# Patient Record
Sex: FEMALE | Race: WHITE | NOT HISPANIC OR LATINO | Employment: UNEMPLOYED | URBAN - METROPOLITAN AREA
[De-identification: names, ages, dates, MRNs, and addresses within clinical notes are randomized per-mention and may not be internally consistent; named-entity substitution may affect disease eponyms.]

---

## 2017-07-24 ENCOUNTER — PROCEDURE VISIT (OUTPATIENT)
Dept: OBSTETRICS AND GYNECOLOGY | Facility: CLINIC | Age: 22
End: 2017-07-24

## 2017-07-24 VITALS — WEIGHT: 156 LBS | SYSTOLIC BLOOD PRESSURE: 120 MMHG | DIASTOLIC BLOOD PRESSURE: 70 MMHG | HEART RATE: 80 BPM

## 2017-07-24 DIAGNOSIS — Z30.09 COUNSELING FOR INITIATION OF BIRTH CONTROL METHOD: ICD-10-CM

## 2017-07-24 DIAGNOSIS — Z01.419 WELL WOMAN EXAM: Primary | ICD-10-CM

## 2017-07-24 PROCEDURE — 99395 PREV VISIT EST AGE 18-39: CPT | Performed by: OBSTETRICS & GYNECOLOGY

## 2017-07-24 NOTE — PROGRESS NOTES
Subjective   Chief Complaint   Patient presents with   • Gynecologic Exam     Preventive exam and pap. No complaints     Salina De La Fuente is a 22 y.o. year old  presenting to be seen for her annual exam. Patient is still breast-feeding was using Depo-Provera for birth control.  She had problems obtaining Depo-Provera from the pharmacy and discontinued use.  She is searching for alternative birth control method but wants to continue nursing for at least 4 months.  Is afraid birth control pills with dry up her breast milk worried about the side effects of IUD and Nexplanon.  Condoms were suggested.  The patient is having monthly periods since 2017.      SEXUAL Hx:  She is currently sexually active.  In the past year there has not been new sexual partners.    Condoms are not typically used.  She would not like to be screened for STD's at today's exam.  Current birth control method: not using any form of contraception and does not wish to get pregnant.  She is not happy with her current method of contraception and does want to discuss alternative methods of contraception.  MENSTRUAL Hx:  Patient's last menstrual period was 2017.  In the past 6 months her cycles have been regular, predictable and occur monthly.  Her menstrual flow is typically light.   Each month on average there are roughly 0 day(s) of very heavy flow.    Intermenstrual bleeding is absent.    Post-coital bleeding is absent.  Dysmenorrhea: is not affecting her activities of daily living  PMS: is not affecting her activities of daily living  Her cycles are not a source of concern for her that she wishes to discuss today.  HEALTH Hx:  She exercises regularly:yes.  She wears her seat belt:yes.  She has concerns about domestic violence: no.  OTHER COMPLAINTS:  Nothing else    The following portions of the patient's history were reviewed and updated as appropriate:problem list, current medications, allergies, past family history, past medical  history, past social history and past surgical history.    Smoking status: Never Smoker                                                              Smokeless status: Never Used                        Review of Systems  Review of Systems - History obtained from the patient  General ROS: negative  Psychological ROS: negative  ENT ROS: positive for - nasal congestion and sore throat  Allergy and Immunology ROS: positive for - seasonal allergies  Hematological and Lymphatic ROS: negative  Endocrine ROS: negative  Breast ROS: negative for breast lumps  Respiratory ROS: no cough, shortness of breath, or wheezing  Cardiovascular ROS: no chest pain or dyspnea on exertion  Gastrointestinal ROS: no abdominal pain, change in bowel habits, or black or bloody stools  Genito-Urinary ROS: no dysuria, trouble voiding, or hematuria  Musculoskeletal ROS: negative  Neurological ROS: no TIA or stroke symptoms  Dermatological ROS: negative        Objective   /70  Pulse 80  Wt 156 lb (70.8 kg)  LMP 07/23/2017  Breastfeeding? Yes    General:  well developed; well nourished  no acute distress   Skin:  No suspicious lesions seen   Thyroid: not examined   Breasts:  Examined in supine position  Symmetric without masses or skin dimpling  Nipples normal without inversion, lesions or discharge  There are no palpable axillary nodes   Abdomen: soft, non-tender; no masses  no umbilical or inginual hernias are present  no hepato-splenomegaly   Heart: regular rate and rhythm, S1, S2 normal, no murmur, click, rub or gallop   Lungs: clear to auscultation   Pelvis: Clinical staff was present for exam  External genitalia:  normal appearance of the external genitalia including [place]'s and [place]'s glands.  :  urethral meatus normal; urethral hypermobility is absent.  Vaginal:  normal pink mucosa without prolapse or lesions. discharge present -  bloody;  Cervix:  normal appearance. Pap was done  Uterus:  normal size, shape and consistency.  retroverted;  Adnexa:  normal bimanual exam of the adnexa.  Rectal:  digital rectal exam not performed; anus visually normal appearing.          Assessment/Plan   Salina was seen today for gynecologic exam.    Diagnoses and all orders for this visit:    Well woman exam  -     Liquid-based Pap Smear, Screening    Counseling for initiation of birth control method         Return one year  This note was electronically signed.    Daniel Prado MD   July 24, 2017

## 2018-04-25 ENCOUNTER — TELEPHONE (OUTPATIENT)
Dept: OBSTETRICS AND GYNECOLOGY | Facility: CLINIC | Age: 23
End: 2018-04-25

## 2018-04-25 NOTE — TELEPHONE ENCOUNTER
Pt called in stating that she had a positive pregnancy test and that she has an appt scheduled for May 23. States that she has had some cramping and is now having some pain in her lower abdomen right over her hip bone that is not unbearable but just wanted to call and make sure that she didn't need to be concerned. Pt stated that she has had no bleeding. I informed pt that she could take tylenol for the pain and that if it became unbearable to go to the ER. Also told pt that if she began to have any bright red bleeding to give us a call or if it was after hours to go to ER. Pt stated understanding.

## 2018-05-23 ENCOUNTER — INITIAL PRENATAL (OUTPATIENT)
Dept: OBSTETRICS AND GYNECOLOGY | Facility: CLINIC | Age: 23
End: 2018-05-23

## 2018-05-23 ENCOUNTER — APPOINTMENT (OUTPATIENT)
Dept: LAB | Facility: HOSPITAL | Age: 23
End: 2018-05-23

## 2018-05-23 VITALS
DIASTOLIC BLOOD PRESSURE: 60 MMHG | BODY MASS INDEX: 28 KG/M2 | SYSTOLIC BLOOD PRESSURE: 110 MMHG | WEIGHT: 164 LBS | HEIGHT: 64 IN

## 2018-05-23 DIAGNOSIS — Z34.81 PRENATAL CARE, SUBSEQUENT PREGNANCY, FIRST TRIMESTER: Primary | ICD-10-CM

## 2018-05-23 LAB
ABO GROUP BLD: NORMAL
BASOPHILS # BLD AUTO: 0.01 10*3/MM3 (ref 0–0.2)
BASOPHILS NFR BLD AUTO: 0.2 % (ref 0–1)
BILIRUB UR QL STRIP: NEGATIVE
BLD GP AB SCN SERPL QL: NEGATIVE
CLARITY UR: CLEAR
COLOR UR: YELLOW
DEPRECATED RDW RBC AUTO: 39.9 FL (ref 37–54)
EOSINOPHIL # BLD AUTO: 0.11 10*3/MM3 (ref 0–0.3)
EOSINOPHIL NFR BLD AUTO: 1.8 % (ref 0–3)
ERYTHROCYTE [DISTWIDTH] IN BLOOD BY AUTOMATED COUNT: 12.4 % (ref 11.3–14.5)
GLUCOSE UR STRIP-MCNC: NEGATIVE MG/DL
HBA1C MFR BLD: 5.3 % (ref 4.8–5.6)
HBV SURFACE AG SERPL QL IA: NORMAL
HCT VFR BLD AUTO: 39.2 % (ref 34.5–44)
HCV AB SER DONR QL: NORMAL
HGB BLD-MCNC: 13.3 G/DL (ref 11.5–15.5)
HGB UR QL STRIP.AUTO: NEGATIVE
HIV1+2 AB SER QL: NORMAL
IMM GRANULOCYTES # BLD: 0.01 10*3/MM3 (ref 0–0.03)
IMM GRANULOCYTES NFR BLD: 0.2 % (ref 0–0.6)
KETONES UR QL STRIP: NEGATIVE
LEUKOCYTE ESTERASE UR QL STRIP.AUTO: NEGATIVE
LYMPHOCYTES # BLD AUTO: 1.76 10*3/MM3 (ref 0.6–4.8)
LYMPHOCYTES NFR BLD AUTO: 29 % (ref 24–44)
MCH RBC QN AUTO: 29.6 PG (ref 27–31)
MCHC RBC AUTO-ENTMCNC: 33.9 G/DL (ref 32–36)
MCV RBC AUTO: 87.3 FL (ref 80–99)
MONOCYTES # BLD AUTO: 0.35 10*3/MM3 (ref 0–1)
MONOCYTES NFR BLD AUTO: 5.8 % (ref 0–12)
NEUTROPHILS # BLD AUTO: 3.82 10*3/MM3 (ref 1.5–8.3)
NEUTROPHILS NFR BLD AUTO: 63 % (ref 41–71)
NITRITE UR QL STRIP: NEGATIVE
PH UR STRIP.AUTO: 7 [PH] (ref 5–8)
PLATELET # BLD AUTO: 211 10*3/MM3 (ref 150–450)
PMV BLD AUTO: 10.4 FL (ref 6–12)
PROT UR QL STRIP: NEGATIVE
RBC # BLD AUTO: 4.49 10*6/MM3 (ref 3.89–5.14)
RH BLD: POSITIVE
RUBV IGG SER QL: NORMAL
RUBV IGG SER-ACNC: 80.4 IU/ML
SP GR UR STRIP: 1.01 (ref 1–1.03)
TSH SERPL DL<=0.05 MIU/L-ACNC: 0.19 MIU/ML (ref 0.35–5.35)
UROBILINOGEN UR QL STRIP: NORMAL
WBC NRBC COR # BLD: 6.06 10*3/MM3 (ref 3.5–10.8)

## 2018-05-23 PROCEDURE — 86803 HEPATITIS C AB TEST: CPT | Performed by: OBSTETRICS & GYNECOLOGY

## 2018-05-23 PROCEDURE — 81003 URINALYSIS AUTO W/O SCOPE: CPT | Performed by: OBSTETRICS & GYNECOLOGY

## 2018-05-23 PROCEDURE — 86850 RBC ANTIBODY SCREEN: CPT | Performed by: OBSTETRICS & GYNECOLOGY

## 2018-05-23 PROCEDURE — 86900 BLOOD TYPING SEROLOGIC ABO: CPT | Performed by: OBSTETRICS & GYNECOLOGY

## 2018-05-23 PROCEDURE — 83036 HEMOGLOBIN GLYCOSYLATED A1C: CPT | Performed by: OBSTETRICS & GYNECOLOGY

## 2018-05-23 PROCEDURE — 36415 COLL VENOUS BLD VENIPUNCTURE: CPT | Performed by: OBSTETRICS & GYNECOLOGY

## 2018-05-23 PROCEDURE — G0432 EIA HIV-1/HIV-2 SCREEN: HCPCS | Performed by: OBSTETRICS & GYNECOLOGY

## 2018-05-23 PROCEDURE — 86901 BLOOD TYPING SEROLOGIC RH(D): CPT | Performed by: OBSTETRICS & GYNECOLOGY

## 2018-05-23 PROCEDURE — 87340 HEPATITIS B SURFACE AG IA: CPT | Performed by: OBSTETRICS & GYNECOLOGY

## 2018-05-23 PROCEDURE — 86762 RUBELLA ANTIBODY: CPT | Performed by: OBSTETRICS & GYNECOLOGY

## 2018-05-23 PROCEDURE — 84443 ASSAY THYROID STIM HORMONE: CPT | Performed by: OBSTETRICS & GYNECOLOGY

## 2018-05-23 PROCEDURE — 85025 COMPLETE CBC W/AUTO DIFF WBC: CPT | Performed by: OBSTETRICS & GYNECOLOGY

## 2018-05-23 PROCEDURE — 80081 OBSTETRIC PANEL INC HIV TSTG: CPT | Performed by: OBSTETRICS & GYNECOLOGY

## 2018-05-23 PROCEDURE — 99214 OFFICE O/P EST MOD 30 MIN: CPT | Performed by: OBSTETRICS & GYNECOLOGY

## 2018-05-23 NOTE — PROGRESS NOTES
@SUBJECTIVEBEGIN  Chief Complaint   Patient presents with   • Initial Prenatal Visit     katia De La Fuente is a 22 y.o. year old .  Patient's last menstrual period was 2018 (exact date)..  She presents to be seen to initiate prenatal care.  She complains of breast tenderness, fatigue and positive home pregnancy test. These symptoms have been occurring for approximately 4 weeks.  She states that nothing helps to alleviate her symptoms.  Pre-existing conditions that could possibly affect the pregnancy is gestational diabetes with her twins. Patient is experiencing crampy abdominal pain but no nausea or vomiting.  Last delivery was twins, vaginal delivery, and they're now about 2 years old.  Patient had gestational diabetes with this pregnancy.    Past Medical History:   Diagnosis Date   • Asthma    • Gestational diabetes    , No past surgical history on file., Family History   Problem Relation Age of Onset   • Stroke Father    • Thyroid disease Mother    • Breast cancer Paternal Grandmother    • Cervical cancer Paternal Grandmother    • Diabetes Paternal Grandmother    • Uterine cancer Paternal Grandmother    • Ovarian cancer Neg Hx    • Colon cancer Neg Hx    , Social History   Substance Use Topics   • Smoking status: Never Smoker   • Smokeless tobacco: Never Used   • Alcohol use No   ,   (Not in a hospital admission) and Allergies:  Corticosteroids and Prednisone    Smoking status: Never Smoker                                                              Smokeless tobacco: Never Used                          The following portions of the patient's history were reviewed and updated as appropriate:vital signs, allergies, current medications, past medical history, past social history, past surgical history and problem list.    Objective     Imaging   No data reviewed    Review of Systems - History obtained from the patient  General ROS: positive for  - fatigue  Psychological ROS: negative  ENT  ROS: negative  Allergy and Immunology ROS: positive for - seasonal allergies  Hematological and Lymphatic ROS: positive for - bruising  Endocrine ROS: negative  Breast ROS: negative for breast lumps  Respiratory ROS: no cough, shortness of breath, or wheezing  Cardiovascular ROS: no chest pain or dyspnea on exertion  Gastrointestinal ROS: no abdominal pain, change in bowel habits, or black or bloody stools  Genito-Urinary ROS: no dysuria, trouble voiding, or hematuria  Musculoskeletal ROS: negative  Neurological ROS: no TIA or stroke symptoms  Dermatological ROS: negative     Physical Exam:  See Episode    Assessment/Plan   ASSESSMENT  Salina was seen today for initial prenatal visit.    Diagnoses and all orders for this visit:    Prenatal care, subsequent pregnancy, first trimester  -     Obstetric Panel  -     Hepatitis C Antibody  -     HIV-1 / O / 2 Ag / Antibody 4th Generation  -     TSH  -     Chlamydia trachomatis, Neisseria gonorrhoeae, Trichomonas vaginalis, PCR - Swab, Vagina  -     Hemoglobin A1c  -     Urinalysis With / Culture If Indicated - Urine, Clean Catch        PLAN  1. Tests ordered today:  Orders Placed This Encounter   Procedures   • Chlamydia trachomatis, Neisseria gonorrhoeae, Trichomonas vaginalis, PCR - Swab, Vagina   • Obstetric Panel   • Hepatitis C Antibody   • HIV-1 / O / 2 Ag / Antibody 4th Generation   • TSH   • Hemoglobin A1c   • Urinalysis With / Culture If Indicated - Urine, Clean Catch     2. Medications prescribed today:  No orders of the defined types were placed in this encounter.    3. Information reviewed: exercise in pregnancy, nutrition in pregnancy, weight gain in pregnancy, work and travel restrictions during pregnancy, list of OTC medications acceptable in pregnancy and call coverage groups  4. Genetic testing reviewed: declines  5. The problem list for pregnancy was initiated today    Follow up: 2 week(s)       This note was electronically signed.    Daniel Prado,  MD   May 23, 2018

## 2018-05-25 LAB — RPR SER QL: NORMAL

## 2018-06-06 ENCOUNTER — ROUTINE PRENATAL (OUTPATIENT)
Dept: OBSTETRICS AND GYNECOLOGY | Facility: CLINIC | Age: 23
End: 2018-06-06

## 2018-06-06 ENCOUNTER — APPOINTMENT (OUTPATIENT)
Dept: LAB | Facility: HOSPITAL | Age: 23
End: 2018-06-06

## 2018-06-06 VITALS — WEIGHT: 165 LBS | SYSTOLIC BLOOD PRESSURE: 90 MMHG | DIASTOLIC BLOOD PRESSURE: 62 MMHG | BODY MASS INDEX: 28.32 KG/M2

## 2018-06-06 DIAGNOSIS — R79.89 LOW TSH LEVEL: ICD-10-CM

## 2018-06-06 DIAGNOSIS — Z34.81 PRENATAL CARE, SUBSEQUENT PREGNANCY, FIRST TRIMESTER: Primary | ICD-10-CM

## 2018-06-06 LAB — T4 FREE SERPL-MCNC: 1.51 NG/DL (ref 0.89–1.76)

## 2018-06-06 PROCEDURE — 99212 OFFICE O/P EST SF 10 MIN: CPT | Performed by: OBSTETRICS & GYNECOLOGY

## 2018-06-06 PROCEDURE — 36415 COLL VENOUS BLD VENIPUNCTURE: CPT | Performed by: OBSTETRICS & GYNECOLOGY

## 2018-06-06 PROCEDURE — 84439 ASSAY OF FREE THYROXINE: CPT | Performed by: OBSTETRICS & GYNECOLOGY

## 2018-06-06 NOTE — PROGRESS NOTES
Lab Results   Component Value Date    ABORH O Rh Positive 02/11/2016       Chief Complaint   Patient presents with   • Routine Prenatal Visit       Today Salina has no specific complaints  See ob flowsheet for physical exam.    Prenatal Assessment  Fetal Heart Rate: 180  Prenatal Vitals  BP: 90/62  Weight: 74.8 kg (165 lb)  Urine Glucose/Protein  Urine Glucose: Negative  Urine Protein: Negative  Vaginal Drainage  Draining Fluid: No  Edema  LLE Edema: None  RLE Edema: None  Facial Edema: None     Tests done today: none  At the time of the next visit, she will need to have none    Impression:   Encounter Diagnoses   Name Primary?   • Prenatal care, subsequent pregnancy, first trimester Yes   • Low TSH level        Plan: Return 4 weeks    This note was electronically signed.    Daniel Prado MD

## 2018-07-05 ENCOUNTER — ROUTINE PRENATAL (OUTPATIENT)
Dept: OBSTETRICS AND GYNECOLOGY | Facility: CLINIC | Age: 23
End: 2018-07-05

## 2018-07-05 VITALS — WEIGHT: 170 LBS | BODY MASS INDEX: 29.18 KG/M2 | DIASTOLIC BLOOD PRESSURE: 80 MMHG | SYSTOLIC BLOOD PRESSURE: 120 MMHG

## 2018-07-05 DIAGNOSIS — Z34.82 PRENATAL CARE, SUBSEQUENT PREGNANCY, SECOND TRIMESTER: Primary | ICD-10-CM

## 2018-07-05 DIAGNOSIS — Z36.3 ENCOUNTER FOR ANTENATAL SCREENING FOR MALFORMATION USING ULTRASOUND: ICD-10-CM

## 2018-07-05 DIAGNOSIS — Z87.09 HISTORY OF ASTHMA: ICD-10-CM

## 2018-07-05 PROCEDURE — 99213 OFFICE O/P EST LOW 20 MIN: CPT | Performed by: OBSTETRICS & GYNECOLOGY

## 2018-07-05 RX ORDER — LORATADINE 10 MG/1
10 TABLET ORAL DAILY
Qty: 30 TABLET | Refills: 6 | Status: SHIPPED | OUTPATIENT
Start: 2018-07-05 | End: 2019-02-05 | Stop reason: SDUPTHER

## 2018-07-05 RX ORDER — ALBUTEROL SULFATE 90 UG/1
2 AEROSOL, METERED RESPIRATORY (INHALATION) EVERY 4 HOURS PRN
Qty: 1 INHALER | Refills: 3 | Status: SHIPPED | OUTPATIENT
Start: 2018-07-05 | End: 2020-08-12

## 2018-07-05 NOTE — PROGRESS NOTES
Lab Results   Component Value Date    ABORH O Rh Positive 2016       Chief Complaint   Patient presents with   • Routine Prenatal Visit     no problems, states she has had asthma attack       Today Salina complains of history of asthma attacks   See ob flowsheet for physical exam.    Prenatal Assessment  Fetal Heart Rate: 162  Movement: Present  Prenatal Vitals  BP: 120/80  Weight: 77.1 kg (170 lb)  Urine Glucose/Protein  Urine Glucose: Negative  Urine Protein: Negative  Vaginal Drainage  Draining Fluid: No  Edema  LLE Edema: None  RLE Edema: None  Facial Edema: None     Tests done today: none  At the time of the next visit, she will need to have US at [facility]    Impression:   Encounter Diagnoses   Name Primary?   • Prenatal care, subsequent pregnancy, second trimester Yes   • History of asthma    • Encounter for  screening for malformation using ultrasound        Plan: US at [facility] return 4 weeks, The patient has a history of asthma.  She requests a refill on her Claritin and would like to be prescribed a albuterol inhaler.  Both prescriptions were sent to her pharmacy    This note was electronically signed.    Dainel Prado MD

## 2018-08-02 ENCOUNTER — HOSPITAL ENCOUNTER (OUTPATIENT)
Dept: WOMENS IMAGING | Facility: HOSPITAL | Age: 23
Discharge: HOME | End: 2018-08-02
Attending: OBSTETRICS & GYNECOLOGY | Admitting: OBSTETRICS & GYNECOLOGY

## 2018-08-02 ENCOUNTER — ROUTINE PRENATAL (OUTPATIENT)
Dept: OBSTETRICS AND GYNECOLOGY | Facility: CLINIC | Age: 23
End: 2018-08-02

## 2018-08-02 ENCOUNTER — APPOINTMENT (OUTPATIENT)
Dept: WOMENS IMAGING | Facility: HOSPITAL | Age: 23
End: 2018-08-02
Attending: OBSTETRICS & GYNECOLOGY

## 2018-08-02 ENCOUNTER — OFFICE VISIT (OUTPATIENT)
Dept: OBSTETRICS AND GYNECOLOGY | Facility: HOSPITAL | Age: 23
End: 2018-08-02

## 2018-08-02 ENCOUNTER — LAB REQUISITION (OUTPATIENT)
Dept: LAB | Facility: HOSPITAL | Age: 23
End: 2018-08-02

## 2018-08-02 VITALS — SYSTOLIC BLOOD PRESSURE: 133 MMHG | DIASTOLIC BLOOD PRESSURE: 75 MMHG | WEIGHT: 173 LBS | BODY MASS INDEX: 29.7 KG/M2

## 2018-08-02 VITALS — WEIGHT: 172 LBS | DIASTOLIC BLOOD PRESSURE: 80 MMHG | BODY MASS INDEX: 29.52 KG/M2 | SYSTOLIC BLOOD PRESSURE: 120 MMHG

## 2018-08-02 DIAGNOSIS — Z36.3 ENCOUNTER FOR ANTENATAL SCREENING FOR MALFORMATION USING ULTRASOUND: ICD-10-CM

## 2018-08-02 DIAGNOSIS — Z34.82 PRENATAL CARE, SUBSEQUENT PREGNANCY, SECOND TRIMESTER: Primary | ICD-10-CM

## 2018-08-02 DIAGNOSIS — IMO0001 CHOROID PLEXUS CYST OF FETUS, SINGLE OR UNSPECIFIED FETUS: Primary | ICD-10-CM

## 2018-08-02 DIAGNOSIS — Z00.00 ENCOUNTER FOR GENERAL ADULT MEDICAL EXAMINATION WITHOUT ABNORMAL FINDINGS: ICD-10-CM

## 2018-08-02 DIAGNOSIS — Z34.90 PREGNANCY, UNSPECIFIED GESTATIONAL AGE: ICD-10-CM

## 2018-08-02 DIAGNOSIS — R21 RASH, SKIN: ICD-10-CM

## 2018-08-02 DIAGNOSIS — IMO0001 ANOMALY OF KIDNEY OF FETUS, SINGLE OR UNSPECIFIED FETUS: ICD-10-CM

## 2018-08-02 PROBLEM — IMO0002 CHOROID PLEXUS CYST OF FETUS: Status: ACTIVE | Noted: 2018-08-02

## 2018-08-02 PROBLEM — IMO0002 RENAL ANOMALY OF FETUS: Status: ACTIVE | Noted: 2018-08-02

## 2018-08-02 PROCEDURE — 76805 OB US >/= 14 WKS SNGL FETUS: CPT | Performed by: OBSTETRICS & GYNECOLOGY

## 2018-08-02 PROCEDURE — 36415 COLL VENOUS BLD VENIPUNCTURE: CPT

## 2018-08-02 PROCEDURE — 99213 OFFICE O/P EST LOW 20 MIN: CPT | Performed by: OBSTETRICS & GYNECOLOGY

## 2018-08-02 PROCEDURE — 76805 OB US >/= 14 WKS SNGL FETUS: CPT

## 2018-08-02 PROCEDURE — 99242 OFF/OP CONSLTJ NEW/EST SF 20: CPT | Performed by: OBSTETRICS & GYNECOLOGY

## 2018-08-02 RX ORDER — PRENATAL VIT/IRON FUM/FOLIC AC 27MG-0.8MG
1 TABLET ORAL DAILY
Qty: 30 TABLET | Refills: 10 | Status: SHIPPED | OUTPATIENT
Start: 2018-08-02 | End: 2020-06-10 | Stop reason: SDUPTHER

## 2018-08-07 ENCOUNTER — TELEPHONE (OUTPATIENT)
Dept: WOMENS IMAGING | Facility: HOSPITAL | Age: 23
End: 2018-08-07

## 2018-08-08 ENCOUNTER — TELEPHONE (OUTPATIENT)
Dept: WOMENS IMAGING | Facility: HOSPITAL | Age: 23
End: 2018-08-08

## 2018-08-08 NOTE — TELEPHONE ENCOUNTER
Second attempt at notification of [facility] results.  No answer or voice mail at listed number.

## 2018-08-10 ENCOUNTER — TELEPHONE (OUTPATIENT)
Dept: WOMENS IMAGING | Facility: HOSPITAL | Age: 23
End: 2018-08-10

## 2018-08-16 ENCOUNTER — ROUTINE PRENATAL (OUTPATIENT)
Dept: OBSTETRICS AND GYNECOLOGY | Facility: CLINIC | Age: 23
End: 2018-08-16

## 2018-08-16 VITALS — WEIGHT: 178.2 LBS | DIASTOLIC BLOOD PRESSURE: 72 MMHG | BODY MASS INDEX: 30.59 KG/M2 | SYSTOLIC BLOOD PRESSURE: 118 MMHG

## 2018-08-16 DIAGNOSIS — Z34.82 PRENATAL CARE, SUBSEQUENT PREGNANCY, SECOND TRIMESTER: Primary | ICD-10-CM

## 2018-08-16 DIAGNOSIS — M25.559 PREGNANCY RELATED HIP PAIN IN SECOND TRIMESTER, ANTEPARTUM: ICD-10-CM

## 2018-08-16 DIAGNOSIS — O26.892 PREGNANCY RELATED HIP PAIN IN SECOND TRIMESTER, ANTEPARTUM: ICD-10-CM

## 2018-08-16 PROCEDURE — 99212 OFFICE O/P EST SF 10 MIN: CPT | Performed by: OBSTETRICS & GYNECOLOGY

## 2018-08-16 NOTE — PROGRESS NOTES
Lab Results   Component Value Date    ABORH O Rh Positive 02/11/2016       Chief Complaint   Patient presents with   • Routine Prenatal Visit     pt states that she is having pain in her right hip/ inner leg, and pelvic area.       Today Salina complains of right hip pain  See ob flowsheet for physical exam.    Prenatal Assessment  Fetal Heart Rate: 153  Fundal Height (cm): 21 cm  Movement: Present  Prenatal Vitals  BP: 118/72  Weight: 80.8 kg (178 lb 3.2 oz)  Urine Glucose/Protein  Urine Glucose: Negative  Urine Protein: Negative  Vaginal Drainage  Draining Fluid: No  Edema  LLE Edema: None  RLE Edema: None  Facial Edema: None   Fung's sign: negative    Tests done today: none  At the time of the next visit, she will need to have none    Impression:   Encounter Diagnoses   Name Primary?   • Prenatal care, subsequent pregnancy, second trimester Yes   • Pregnancy related hip pain in second trimester, antepartum        Plan: Return 4 weeks    This note was electronically signed.    Daniel Prado MD

## 2018-08-22 ENCOUNTER — HOSPITAL ENCOUNTER (OUTPATIENT)
Facility: HOSPITAL | Age: 23
Discharge: HOME | End: 2018-08-22
Attending: OBSTETRICS & GYNECOLOGY | Admitting: OBSTETRICS & GYNECOLOGY

## 2018-08-22 ENCOUNTER — APPOINTMENT (OUTPATIENT)
Dept: ULTRASOUND IMAGING | Facility: HOSPITAL | Age: 23
End: 2018-08-22

## 2018-08-22 VITALS
SYSTOLIC BLOOD PRESSURE: 115 MMHG | TEMPERATURE: 97.9 F | HEART RATE: 109 BPM | RESPIRATION RATE: 16 BRPM | BODY MASS INDEX: 30.39 KG/M2 | DIASTOLIC BLOOD PRESSURE: 61 MMHG | WEIGHT: 178 LBS | HEIGHT: 64 IN

## 2018-08-22 DIAGNOSIS — Z3A.21 21 WEEKS GESTATION OF PREGNANCY: Primary | ICD-10-CM

## 2018-08-22 DIAGNOSIS — E87.6 HYPOKALEMIA: ICD-10-CM

## 2018-08-22 DIAGNOSIS — E86.0 DEHYDRATION: ICD-10-CM

## 2018-08-22 DIAGNOSIS — R10.13 EPIGASTRIC PAIN: ICD-10-CM

## 2018-08-22 LAB
ANION GAP SERPL CALCULATED.3IONS-SCNC: 6 MMOL/L (ref 3–11)
BASOPHILS # BLD AUTO: 0.01 10*3/MM3 (ref 0–0.2)
BASOPHILS NFR BLD AUTO: 0.2 % (ref 0–1)
BILIRUB UR QL STRIP: NEGATIVE
BUN BLD-MCNC: 5 MG/DL (ref 9–23)
BUN/CREAT SERPL: 12.5 (ref 7–25)
CALCIUM SPEC-SCNC: 6.9 MG/DL (ref 8.7–10.4)
CHLORIDE SERPL-SCNC: 111 MMOL/L (ref 99–109)
CLARITY UR: CLEAR
CO2 SERPL-SCNC: 18 MMOL/L (ref 20–31)
COLOR UR: YELLOW
CREAT BLD-MCNC: 0.4 MG/DL (ref 0.6–1.3)
DEPRECATED RDW RBC AUTO: 43.3 FL (ref 37–54)
EOSINOPHIL # BLD AUTO: 0.03 10*3/MM3 (ref 0–0.3)
EOSINOPHIL NFR BLD AUTO: 0.6 % (ref 0–3)
ERYTHROCYTE [DISTWIDTH] IN BLOOD BY AUTOMATED COUNT: 13.4 % (ref 11.3–14.5)
GFR SERPL CREATININE-BSD FRML MDRD: >150 ML/MIN/1.73
GLUCOSE BLD-MCNC: 106 MG/DL (ref 70–100)
GLUCOSE UR STRIP-MCNC: NEGATIVE MG/DL
HCT VFR BLD AUTO: 33.5 % (ref 34.5–44)
HGB BLD-MCNC: 11.3 G/DL (ref 11.5–15.5)
HGB UR QL STRIP.AUTO: NEGATIVE
IMM GRANULOCYTES # BLD: 0.04 10*3/MM3 (ref 0–0.03)
IMM GRANULOCYTES NFR BLD: 0.8 % (ref 0–0.6)
KETONES UR QL STRIP: ABNORMAL
LEUKOCYTE ESTERASE UR QL STRIP.AUTO: NEGATIVE
LYMPHOCYTES # BLD AUTO: 0.8 10*3/MM3 (ref 0.6–4.8)
LYMPHOCYTES NFR BLD AUTO: 16.4 % (ref 24–44)
MCH RBC QN AUTO: 29.9 PG (ref 27–31)
MCHC RBC AUTO-ENTMCNC: 33.7 G/DL (ref 32–36)
MCV RBC AUTO: 88.6 FL (ref 80–99)
MONOCYTES # BLD AUTO: 0.33 10*3/MM3 (ref 0–1)
MONOCYTES NFR BLD AUTO: 6.8 % (ref 0–12)
NEUTROPHILS # BLD AUTO: 3.7 10*3/MM3 (ref 1.5–8.3)
NEUTROPHILS NFR BLD AUTO: 76 % (ref 41–71)
NITRITE UR QL STRIP: NEGATIVE
PH UR STRIP.AUTO: 5.5 [PH] (ref 5–8)
PLATELET # BLD AUTO: 131 10*3/MM3 (ref 150–450)
PMV BLD AUTO: 11.4 FL (ref 6–12)
POTASSIUM BLD-SCNC: 3 MMOL/L (ref 3.5–5.5)
PROT UR QL STRIP: NEGATIVE
RBC # BLD AUTO: 3.78 10*6/MM3 (ref 3.89–5.14)
SODIUM BLD-SCNC: 135 MMOL/L (ref 132–146)
SP GR UR STRIP: 1.01 (ref 1–1.03)
UROBILINOGEN UR QL STRIP: ABNORMAL
WBC NRBC COR # BLD: 4.87 10*3/MM3 (ref 3.5–10.8)

## 2018-08-22 PROCEDURE — 85025 COMPLETE CBC W/AUTO DIFF WBC: CPT | Performed by: OBSTETRICS & GYNECOLOGY

## 2018-08-22 PROCEDURE — 99214 OFFICE O/P EST MOD 30 MIN: CPT | Performed by: OBSTETRICS & GYNECOLOGY

## 2018-08-22 PROCEDURE — 96360 HYDRATION IV INFUSION INIT: CPT

## 2018-08-22 PROCEDURE — 96361 HYDRATE IV INFUSION ADD-ON: CPT

## 2018-08-22 PROCEDURE — 80048 BASIC METABOLIC PNL TOTAL CA: CPT | Performed by: OBSTETRICS & GYNECOLOGY

## 2018-08-22 PROCEDURE — 76705 ECHO EXAM OF ABDOMEN: CPT

## 2018-08-22 PROCEDURE — G0463 HOSPITAL OUTPT CLINIC VISIT: HCPCS

## 2018-08-22 PROCEDURE — 81003 URINALYSIS AUTO W/O SCOPE: CPT | Performed by: OBSTETRICS & GYNECOLOGY

## 2018-08-22 RX ORDER — ACETAMINOPHEN 500 MG
500 TABLET ORAL ONCE
Status: COMPLETED | OUTPATIENT
Start: 2018-08-22 | End: 2018-08-22

## 2018-08-22 RX ORDER — SODIUM CHLORIDE, SODIUM LACTATE, POTASSIUM CHLORIDE, CALCIUM CHLORIDE 600; 310; 30; 20 MG/100ML; MG/100ML; MG/100ML; MG/100ML
100 INJECTION, SOLUTION INTRAVENOUS CONTINUOUS
Status: DISCONTINUED | OUTPATIENT
Start: 2018-08-22 | End: 2018-08-22 | Stop reason: HOSPADM

## 2018-08-22 RX ORDER — FAMOTIDINE 20 MG/1
20 TABLET, FILM COATED ORAL 2 TIMES DAILY
Status: DISCONTINUED | OUTPATIENT
Start: 2018-08-22 | End: 2018-08-22 | Stop reason: HOSPADM

## 2018-08-22 RX ORDER — POTASSIUM CHLORIDE 750 MG/1
40 CAPSULE, EXTENDED RELEASE ORAL ONCE
Status: COMPLETED | OUTPATIENT
Start: 2018-08-22 | End: 2018-08-22

## 2018-08-22 RX ORDER — ONDANSETRON 4 MG/1
4 TABLET, FILM COATED ORAL EVERY 6 HOURS PRN
Status: DISCONTINUED | OUTPATIENT
Start: 2018-08-22 | End: 2018-08-22 | Stop reason: HOSPADM

## 2018-08-22 RX ORDER — SODIUM CHLORIDE 0.9 % (FLUSH) 0.9 %
1-10 SYRINGE (ML) INJECTION AS NEEDED
Status: DISCONTINUED | OUTPATIENT
Start: 2018-08-22 | End: 2018-08-22 | Stop reason: HOSPADM

## 2018-08-22 RX ORDER — FAMOTIDINE 20 MG/1
20 TABLET, FILM COATED ORAL 2 TIMES DAILY
Qty: 60 TABLET | Refills: 1 | Status: SHIPPED | OUTPATIENT
Start: 2018-08-22 | End: 2018-09-21

## 2018-08-22 RX ORDER — LIDOCAINE HYDROCHLORIDE 10 MG/ML
5 INJECTION, SOLUTION EPIDURAL; INFILTRATION; INTRACAUDAL; PERINEURAL AS NEEDED
Status: DISCONTINUED | OUTPATIENT
Start: 2018-08-22 | End: 2018-08-22 | Stop reason: HOSPADM

## 2018-08-22 RX ADMIN — SODIUM CHLORIDE, POTASSIUM CHLORIDE, SODIUM LACTATE AND CALCIUM CHLORIDE 75 ML/HR: 600; 310; 30; 20 INJECTION, SOLUTION INTRAVENOUS at 06:15

## 2018-08-22 RX ADMIN — FAMOTIDINE 20 MG: 20 TABLET ORAL at 08:59

## 2018-08-22 RX ADMIN — ACETAMINOPHEN 500 MG: 500 TABLET, FILM COATED ORAL at 17:24

## 2018-08-22 RX ADMIN — POTASSIUM CHLORIDE 40 MEQ: 750 CAPSULE, EXTENDED RELEASE ORAL at 13:56

## 2018-09-13 PROBLEM — Z34.80 PRENATAL CARE, SUBSEQUENT PREGNANCY: Status: ACTIVE | Noted: 2018-09-13

## 2018-09-13 PROBLEM — Z34.90 PREGNANCY: Status: RESOLVED | Noted: 2018-08-02 | Resolved: 2018-09-13

## 2018-09-13 PROBLEM — O99.019 ANEMIA IN PREGNANCY: Status: ACTIVE | Noted: 2018-09-13

## 2018-09-14 ENCOUNTER — ROUTINE PRENATAL (OUTPATIENT)
Dept: OBSTETRICS AND GYNECOLOGY | Facility: CLINIC | Age: 23
End: 2018-09-14

## 2018-09-14 VITALS — DIASTOLIC BLOOD PRESSURE: 68 MMHG | WEIGHT: 181 LBS | BODY MASS INDEX: 31.07 KG/M2 | SYSTOLIC BLOOD PRESSURE: 120 MMHG

## 2018-09-14 DIAGNOSIS — Z34.82 PRENATAL CARE, SUBSEQUENT PREGNANCY IN SECOND TRIMESTER: ICD-10-CM

## 2018-09-14 PROCEDURE — 99213 OFFICE O/P EST LOW 20 MIN: CPT | Performed by: OBSTETRICS & GYNECOLOGY

## 2018-09-14 NOTE — PROGRESS NOTES
Chief Complaint   Patient presents with   • Routine Prenatal Visit     ob visit, denies c/o's       HPI: Salina is a  currently at 24w6d who today reports the following:  Contractions - No; Leaking - No; Vaginal bleeding -  No; Heartburn - No. Feels much better after being in the hospital a few weeks ago    ROS:  GI: Nausea - No ; Constipation - No; Diarrhea - No    Neuro: Headache - No ; Visual change - No      EXAM:  Vitals: See prenatal flowsheet   Abdomen: See prenatal flowsheet   Urine glucose/protein: See prenatal flowsheet   Pelvic: See prenatal flowsheet     Lab Results   Component Value Date    ABORH O Rh Positive 2016    ABO O 2018    RH Positive 2018    ABSCRN Negative 2018       MDM:  Impression: 1. Supervision of high risk pregnancy   2. History of gestational DM in prior pregnancy    Tests done today: 1. none   Topics discussed: 1. Continue with PNV's  2. Prenatal labs reviewed  3. importance of glucola test given history of GDM in past.    4. Reviewed anatomy us again and repeat at 32 weeks    Tests scheduled today for her next visit:   GCT  HgB as soon as possible. RV in 4 weeks

## 2018-09-24 ENCOUNTER — LAB (OUTPATIENT)
Dept: LAB | Facility: HOSPITAL | Age: 23
End: 2018-09-24

## 2018-09-24 DIAGNOSIS — Z34.82 PRENATAL CARE, SUBSEQUENT PREGNANCY IN SECOND TRIMESTER: ICD-10-CM

## 2018-09-24 LAB
GLUCOSE 1H P 100 G GLC PO SERPL-MCNC: 114 MG/DL (ref 65–140)
HCT VFR BLD AUTO: 37.2 % (ref 34.5–44)
HGB BLD-MCNC: 12.3 G/DL (ref 11.5–15.5)

## 2018-09-24 PROCEDURE — 85018 HEMOGLOBIN: CPT

## 2018-09-24 PROCEDURE — 85014 HEMATOCRIT: CPT

## 2018-09-24 PROCEDURE — 82950 GLUCOSE TEST: CPT

## 2018-09-24 PROCEDURE — 36415 COLL VENOUS BLD VENIPUNCTURE: CPT

## 2018-10-12 ENCOUNTER — ROUTINE PRENATAL (OUTPATIENT)
Dept: OBSTETRICS AND GYNECOLOGY | Facility: CLINIC | Age: 23
End: 2018-10-12

## 2018-10-12 VITALS — DIASTOLIC BLOOD PRESSURE: 70 MMHG | BODY MASS INDEX: 32.1 KG/M2 | SYSTOLIC BLOOD PRESSURE: 118 MMHG | WEIGHT: 187 LBS

## 2018-10-12 DIAGNOSIS — Z34.83 PRENATAL CARE, SUBSEQUENT PREGNANCY IN THIRD TRIMESTER: Primary | ICD-10-CM

## 2018-10-12 PROCEDURE — 99213 OFFICE O/P EST LOW 20 MIN: CPT | Performed by: OBSTETRICS & GYNECOLOGY

## 2018-10-12 NOTE — PROGRESS NOTES
Chief Complaint   Patient presents with   • Routine Prenatal Visit       HPI: Salnia is a  currently at 28w6d who today reports the following:  Contractions - YES - but less than 4/hour AND no associated change in vaginal discharge; Leaking - No; Vaginal bleeding -  No; Heartburn - No. Some right sided pain a few days ago but not present now     ROS:  GI: Nausea - No ; Constipation - No; Diarrhea - No    Neuro: Headache - No ; Visual change - No      EXAM:  Vitals: See prenatal flowsheet   Abdomen: See prenatal flowsheet   Urine glucose/protein: See prenatal flowsheet   Pelvic: See prenatal flowsheet     Lab Results   Component Value Date    HGB 12.3 2018    HCT 37.2 2018     2018    ABORH O Rh Positive 2016    ABO O 2018    RH Positive 2018    ABSCRN Negative 2018       MDM:  Impression: 1. Supervision of high risk pregnancy   Tests done today: 1. none   Topics discussed: 1. Continue with PNV's  2. Prenatal labs reviewed  3.  labor signs and symptoms  4. TDAP vaccination  5. recommended to get influenza vaccination    Tests scheduled today for her next visit:   none. RV in 2 weeks

## 2018-10-29 ENCOUNTER — ROUTINE PRENATAL (OUTPATIENT)
Dept: OBSTETRICS AND GYNECOLOGY | Facility: CLINIC | Age: 23
End: 2018-10-29

## 2018-10-29 VITALS — DIASTOLIC BLOOD PRESSURE: 70 MMHG | SYSTOLIC BLOOD PRESSURE: 128 MMHG | WEIGHT: 191 LBS | BODY MASS INDEX: 32.79 KG/M2

## 2018-10-29 DIAGNOSIS — Z34.83 PRENATAL CARE, SUBSEQUENT PREGNANCY IN THIRD TRIMESTER: ICD-10-CM

## 2018-10-29 DIAGNOSIS — O99.013 ANEMIA DURING PREGNANCY IN THIRD TRIMESTER: ICD-10-CM

## 2018-10-29 PROCEDURE — 99213 OFFICE O/P EST LOW 20 MIN: CPT | Performed by: OBSTETRICS & GYNECOLOGY

## 2018-10-29 NOTE — PROGRESS NOTES
Chief Complaint   Patient presents with   • Routine Prenatal Visit     ob visit w/o c/o's       HPI: Salina is a  currently at 31w2d who today reports the following:  Contractions - YES - but less than 4/hour AND no associated change in vaginal discharge; Leaking - No; Vaginal bleeding -  No; Heartburn - No. Some right hip pain that has worsened.     ROS:  GI: Nausea - No ; Constipation - No; Diarrhea - No    Neuro: Headache - No ; Visual change - No      EXAM:  Vitals: See prenatal flowsheet   Abdomen: See prenatal flowsheet   Urine glucose/protein: See prenatal flowsheet   Pelvic: See prenatal flowsheet     Lab Results   Component Value Date    HGB 12.3 2018    HCT 37.2 2018     2018    ABORH O Rh Positive 2016    ABO O 2018    RH Positive 2018    ABSCRN Negative 2018       MDM:  Impression: 1. Supervision of high risk pregnancy   Tests done today: 1. none   Topics discussed: 1. Continue with PNV's  2. Prenatal labs reviewed  3. none - she had no major complaints,questions or concerns   Tests scheduled today for her next visit:   none

## 2018-11-08 ENCOUNTER — HOSPITAL ENCOUNTER (OUTPATIENT)
Dept: WOMENS IMAGING | Facility: HOSPITAL | Age: 23
Discharge: HOME | End: 2018-11-08
Attending: OBSTETRICS & GYNECOLOGY | Admitting: OBSTETRICS & GYNECOLOGY

## 2018-11-08 ENCOUNTER — OFFICE VISIT (OUTPATIENT)
Dept: OBSTETRICS AND GYNECOLOGY | Facility: HOSPITAL | Age: 23
End: 2018-11-08

## 2018-11-08 VITALS — BODY MASS INDEX: 33.47 KG/M2 | WEIGHT: 195 LBS | SYSTOLIC BLOOD PRESSURE: 120 MMHG | DIASTOLIC BLOOD PRESSURE: 74 MMHG

## 2018-11-08 DIAGNOSIS — Z34.90 PREGNANCY, UNSPECIFIED GESTATIONAL AGE: ICD-10-CM

## 2018-11-08 DIAGNOSIS — IMO0001 CHOROID PLEXUS CYST OF FETUS, SINGLE OR UNSPECIFIED FETUS: ICD-10-CM

## 2018-11-08 DIAGNOSIS — O35.EXX0 FETAL HYDRONEPHROSIS DURING PREGNANCY, ANTEPARTUM, SINGLE OR UNSPECIFIED FETUS: Primary | ICD-10-CM

## 2018-11-08 DIAGNOSIS — O99.013 ANEMIA DURING PREGNANCY IN THIRD TRIMESTER: ICD-10-CM

## 2018-11-08 DIAGNOSIS — IMO0001 ANOMALY OF KIDNEY OF FETUS, SINGLE OR UNSPECIFIED FETUS: ICD-10-CM

## 2018-11-08 PROBLEM — IMO0002 RENAL ANOMALY OF FETUS: Status: RESOLVED | Noted: 2018-08-02 | Resolved: 2018-11-08

## 2018-11-08 PROBLEM — IMO0002 CHOROID PLEXUS CYST OF FETUS: Status: RESOLVED | Noted: 2018-08-02 | Resolved: 2018-11-08

## 2018-11-08 PROCEDURE — 76816 OB US FOLLOW-UP PER FETUS: CPT

## 2018-11-08 PROCEDURE — 76816 OB US FOLLOW-UP PER FETUS: CPT | Performed by: OBSTETRICS & GYNECOLOGY

## 2018-11-08 NOTE — PROGRESS NOTES
Documentation of the ultasound findings, images, and interpretations with be available in the patient's Viewpoint report located in the Chart Review Imaging tab in [employer].

## 2018-11-12 ENCOUNTER — ROUTINE PRENATAL (OUTPATIENT)
Dept: OBSTETRICS AND GYNECOLOGY | Facility: CLINIC | Age: 23
End: 2018-11-12

## 2018-11-12 VITALS — DIASTOLIC BLOOD PRESSURE: 74 MMHG | WEIGHT: 197 LBS | SYSTOLIC BLOOD PRESSURE: 118 MMHG | BODY MASS INDEX: 33.81 KG/M2

## 2018-11-12 DIAGNOSIS — O99.013 ANEMIA DURING PREGNANCY IN THIRD TRIMESTER: ICD-10-CM

## 2018-11-12 DIAGNOSIS — Z34.83 PRENATAL CARE, SUBSEQUENT PREGNANCY IN THIRD TRIMESTER: ICD-10-CM

## 2018-11-12 PROCEDURE — 99213 OFFICE O/P EST LOW 20 MIN: CPT | Performed by: OBSTETRICS & GYNECOLOGY

## 2018-11-12 NOTE — PROGRESS NOTES
Chief Complaint   Patient presents with   • Routine Prenatal Visit     ob visit, denied c/o's       HPI: Salina is a  currently at 33w2d who today reports the following:  Contractions - No; Leaking - No; Vaginal bleeding -  No; Swelling of extremities - No.    ROS:  GI: Nausea - No; Constipation - No; Diarrhea - No    Neuro: Headache - No; Visual change - No      EXAM:  Vitals: See prenatal flowsheet   Abdomen: See prenatal flowsheet   Urine glucose/protein: See prenatal flowsheet   Pelvic: See prenatal flowsheet   MDM:   Impression: 1. Supervision of high risk pregnancy   Tests done today: 1. none   Topics discussed: 1. Continue with PNV's  2. Prenatal labs reviewed  3. flu vaccine   Tests scheduled today for her next visit:   none

## 2018-11-28 ENCOUNTER — ROUTINE PRENATAL (OUTPATIENT)
Dept: OBSTETRICS AND GYNECOLOGY | Facility: CLINIC | Age: 23
End: 2018-11-28

## 2018-11-28 VITALS — BODY MASS INDEX: 35.05 KG/M2 | WEIGHT: 204.2 LBS | DIASTOLIC BLOOD PRESSURE: 78 MMHG | SYSTOLIC BLOOD PRESSURE: 118 MMHG

## 2018-11-28 DIAGNOSIS — Z34.83 PRENATAL CARE, SUBSEQUENT PREGNANCY IN THIRD TRIMESTER: Primary | ICD-10-CM

## 2018-11-28 DIAGNOSIS — Z36.85 ANTENATAL SCREENING FOR STREPTOCOCCUS B: ICD-10-CM

## 2018-11-28 DIAGNOSIS — O35.EXX0 FETAL HYDRONEPHROSIS DURING PREGNANCY, ANTEPARTUM, SINGLE OR UNSPECIFIED FETUS: ICD-10-CM

## 2018-11-28 PROCEDURE — 99213 OFFICE O/P EST LOW 20 MIN: CPT | Performed by: OBSTETRICS & GYNECOLOGY

## 2018-11-28 NOTE — PROGRESS NOTES
Lab Results   Component Value Date    ABORH O Rh Positive 2016       Chief Complaint   Patient presents with   • Routine Prenatal Visit     GBS today. pt states that she is having more pressure and more hip pain then before.       Today Salina has no specific complaints  See ob flowsheet for physical exam.    Prenatal Assessment  Fetal Heart Rate: 148  Movement: Present  Prenatal Vitals  BP: 118/78  Weight: 92.6 kg (204 lb 3.2 oz)  Urine Glucose/Protein  Urine Glucose: Negative  Urine Protein: Negative     Tests done today: GBS screen  At the time of the next visit, she will need to have none    Impression:   Encounter Diagnoses   Name Primary?   • Prenatal care, subsequent pregnancy in third trimester Yes   •  screening for streptococcus B    • Fetal hydronephrosis during pregnancy, antepartum, single or unspecified fetus        Plan: Return in 1 week    This note was electronically signed.    Daniel Prado MD

## 2018-12-05 ENCOUNTER — ROUTINE PRENATAL (OUTPATIENT)
Dept: OBSTETRICS AND GYNECOLOGY | Facility: CLINIC | Age: 23
End: 2018-12-05

## 2018-12-05 VITALS — SYSTOLIC BLOOD PRESSURE: 118 MMHG | DIASTOLIC BLOOD PRESSURE: 72 MMHG | WEIGHT: 205 LBS | BODY MASS INDEX: 35.19 KG/M2

## 2018-12-05 DIAGNOSIS — Z34.83 PRENATAL CARE, SUBSEQUENT PREGNANCY IN THIRD TRIMESTER: Primary | ICD-10-CM

## 2018-12-05 PROCEDURE — 99212 OFFICE O/P EST SF 10 MIN: CPT | Performed by: OBSTETRICS & GYNECOLOGY

## 2018-12-05 NOTE — PROGRESS NOTES
Lab Results   Component Value Date    ABORH O Rh Positive 02/11/2016       Chief Complaint   Patient presents with   • Routine Prenatal Visit     pt states no c/o       Today Salina has no specific complaints  See ob flowsheet for physical exam.    Prenatal Assessment  Fetal Heart Rate: 140  Fundal Height (cm): 37 cm  Movement: Present  Presentation: Vertex  Prenatal Vitals  BP: 118/72  Weight: 93 kg (205 lb)  Urine Glucose/Protein  Urine Glucose: Negative  Urine Protein: Negative  Vaginal Drainage  Draining Fluid: No  Edema  LLE Edema: None  RLE Edema: None  Facial Edema: None     Tests done today: none  At the time of the next visit, she will need to have none    Impression:   Encounter Diagnoses   Name Primary?   • Prenatal care, subsequent pregnancy in third trimester Yes       Plan: Return 1 week    This note was electronically signed.    Daniel Prado MD

## 2018-12-12 ENCOUNTER — ROUTINE PRENATAL (OUTPATIENT)
Dept: OBSTETRICS AND GYNECOLOGY | Facility: CLINIC | Age: 23
End: 2018-12-12

## 2018-12-12 VITALS — SYSTOLIC BLOOD PRESSURE: 118 MMHG | WEIGHT: 207 LBS | DIASTOLIC BLOOD PRESSURE: 78 MMHG | BODY MASS INDEX: 35.53 KG/M2

## 2018-12-12 DIAGNOSIS — Z34.83 PRENATAL CARE, SUBSEQUENT PREGNANCY IN THIRD TRIMESTER: Primary | ICD-10-CM

## 2018-12-12 PROCEDURE — 99213 OFFICE O/P EST LOW 20 MIN: CPT | Performed by: OBSTETRICS & GYNECOLOGY

## 2018-12-12 NOTE — PROGRESS NOTES
Lab Results   Component Value Date    ABORH O Rh Positive 02/11/2016       Chief Complaint   Patient presents with   • Routine Prenatal Visit     pt states no c/o       Today Salina complains of irregular contractions   See ob flowsheet for physical exam.    Prenatal Assessment  Fetal Heart Rate: 148  Fundal Height (cm): 38 cm  Movement: Present  Presentation: Vertex  Prenatal Vitals  BP: 118/78  Weight: 93.9 kg (207 lb)  Urine Glucose/Protein  Urine Glucose: Negative  Urine Protein: Negative  Dilation/Effacement/Station  Dilation: Fingertip  Effacement (%): 40  Station: -3  Vaginal Drainage  Draining Fluid: No  Edema  LLE Edema: None  RLE Edema: None  Facial Edema: None     Tests done today: none  At the time of the next visit, she will need to have none    Impression:   Encounter Diagnoses   Name Primary?   • Prenatal care, subsequent pregnancy in third trimester Yes       Plan: Return 12/18/18, patient is having the discomforts of pregnancy    This note was electronically signed.    Daniel Prado MD

## 2018-12-18 ENCOUNTER — ROUTINE PRENATAL (OUTPATIENT)
Dept: OBSTETRICS AND GYNECOLOGY | Facility: CLINIC | Age: 23
End: 2018-12-18

## 2018-12-18 VITALS — DIASTOLIC BLOOD PRESSURE: 78 MMHG | BODY MASS INDEX: 35.7 KG/M2 | SYSTOLIC BLOOD PRESSURE: 114 MMHG | WEIGHT: 208 LBS

## 2018-12-18 DIAGNOSIS — Z34.83 PRENATAL CARE, SUBSEQUENT PREGNANCY IN THIRD TRIMESTER: Primary | ICD-10-CM

## 2018-12-18 PROCEDURE — 99213 OFFICE O/P EST LOW 20 MIN: CPT | Performed by: OBSTETRICS & GYNECOLOGY

## 2018-12-18 NOTE — PROGRESS NOTES
Lab Results   Component Value Date    ABORH O Rh Positive 02/11/2016       Chief Complaint   Patient presents with   • Routine Prenatal Visit     pt states no c/o       Today Salina has no specific complaints  See ob flowsheet for physical exam.    Prenatal Assessment  Fetal Heart Rate: 138  Fundal Height (cm): 39 cm  Movement: Present  Presentation: Vertex  Prenatal Vitals  BP: 114/78  Weight: 94.3 kg (208 lb)  Urine Glucose/Protein  Urine Glucose: Negative  Urine Protein: Negative  Dilation/Effacement/Station  Dilation: Fingertip  Effacement (%): 30  Station: -4  Vaginal Drainage  Draining Fluid: No  Edema  LLE Edema: Mild pitting, slight indentation  RLE Edema: Mild pitting, slight indentation  Facial Edema: None     Tests done today: none  At the time of the next visit, she will need to have none    Impression:   Encounter Diagnoses   Name Primary?   • Prenatal care, subsequent pregnancy in third trimester Yes       Plan: Return 1 week, Beta Strep is negative, Baby is moving well, no problems    This note was electronically signed.    Daniel Prado MD

## 2018-12-19 ENCOUNTER — HOSPITAL ENCOUNTER (OUTPATIENT)
Facility: HOSPITAL | Age: 23
Discharge: HOME | End: 2018-12-20
Attending: OBSTETRICS & GYNECOLOGY | Admitting: OBSTETRICS & GYNECOLOGY

## 2018-12-19 VITALS
TEMPERATURE: 98.2 F | DIASTOLIC BLOOD PRESSURE: 76 MMHG | BODY MASS INDEX: 35.51 KG/M2 | WEIGHT: 208 LBS | HEIGHT: 64 IN | RESPIRATION RATE: 18 BRPM | HEART RATE: 88 BPM | SYSTOLIC BLOOD PRESSURE: 128 MMHG

## 2018-12-20 LAB
BILIRUB UR QL STRIP: NEGATIVE
CLARITY UR: CLEAR
COLOR UR: YELLOW
GLUCOSE UR STRIP-MCNC: NEGATIVE MG/DL
HGB UR QL STRIP.AUTO: NEGATIVE
KETONES UR QL STRIP: NEGATIVE
LEUKOCYTE ESTERASE UR QL STRIP.AUTO: NEGATIVE
NITRITE UR QL STRIP: NEGATIVE
PH UR STRIP.AUTO: 6 [PH] (ref 5–8)
PROT UR QL STRIP: NEGATIVE
SP GR UR STRIP: 1.02 (ref 1–1.03)
UROBILINOGEN UR QL STRIP: NORMAL

## 2018-12-20 PROCEDURE — G0463 HOSPITAL OUTPT CLINIC VISIT: HCPCS

## 2018-12-20 PROCEDURE — 81003 URINALYSIS AUTO W/O SCOPE: CPT | Performed by: OBSTETRICS & GYNECOLOGY

## 2018-12-20 PROCEDURE — 25010000002 MORPHINE PER 10 MG: Performed by: OBSTETRICS & GYNECOLOGY

## 2018-12-20 PROCEDURE — 99214 OFFICE O/P EST MOD 30 MIN: CPT | Performed by: OBSTETRICS & GYNECOLOGY

## 2018-12-20 PROCEDURE — 87086 URINE CULTURE/COLONY COUNT: CPT | Performed by: OBSTETRICS & GYNECOLOGY

## 2018-12-20 PROCEDURE — 96372 THER/PROPH/DIAG INJ SC/IM: CPT

## 2018-12-20 PROCEDURE — 63710000001 PROMETHAZINE PER 25 MG: Performed by: OBSTETRICS & GYNECOLOGY

## 2018-12-20 PROCEDURE — 59025 FETAL NON-STRESS TEST: CPT

## 2018-12-20 RX ORDER — MORPHINE SULFATE 10 MG/ML
10 INJECTION INTRAMUSCULAR; INTRAVENOUS; SUBCUTANEOUS ONCE
Status: COMPLETED | OUTPATIENT
Start: 2018-12-20 | End: 2018-12-20

## 2018-12-20 RX ORDER — PROMETHAZINE HYDROCHLORIDE 25 MG/1
25 TABLET ORAL ONCE
Status: COMPLETED | OUTPATIENT
Start: 2018-12-20 | End: 2018-12-20

## 2018-12-20 RX ADMIN — PROMETHAZINE HYDROCHLORIDE 25 MG: 25 TABLET ORAL at 02:26

## 2018-12-20 RX ADMIN — MORPHINE SULFATE 10 MG: 10 INJECTION INTRAVENOUS at 02:26

## 2018-12-20 NOTE — NURSING NOTE
Pt. Discharged ambulatory to personal vehicle. Refused wheelchair ride to car.  Mother at side.  Instructions in hand.

## 2018-12-20 NOTE — H&P
[facility]  Obstetric History and Physical    Chief Complaint   Patient presents with   • Contractions       Subjective     Patient is a 23 y.o. female  currently at 38w5d, who presents with right sided lower back pain.  She said it started hurting this evening and just never let up.  She decided that she should come in to be seen since the pain was constant and was not improving.  She also decided to she should take a shower and while doing that noticed contractions.  She had bee closed in the office and was closed here despite some contractions.  She denies fever , nausea, and chills.   Denies leaking or bleeding.   +FM.         The following portions of the patients history were reviewed and updated as appropriate: current medications, allergies, past medical history, past surgical history, past family history, past social history and problem list .       Prenatal Information:   Maternal Prenatal Labs  Blood Type No results found for: ABO   Rh Status No results found for: RH   Antibody Screen No results found for: ABSCRN   Gonnorhea No results found for: GCCX   Chlamydia No results found for: CLAMYDCU   RPR No results found for: RPR   Syphilis Antibody No results found for: SYPHILIS   Rubella No results found for: RUBELLAIGGIN   Hepatitis B Surface Antigen No results found for: HEPBSAG   HIV-1 Antibody No results found for: LABHIV1   Hepatitis C Antibody No results found for: HEPCAB   Rapid Urin Drug Screen No results found for: AMPMETHU, BARBITSCNUR, LABBENZSCN, LABMETHSCN, LABOPIASCN, THCURSCR, COCAINEUR, AMPHETSCREEN, PROPOXSCN, BUPRENORSCNU, METAMPSCNUR, OXYCODONESCN, TRICYCLICSCN   Group B Strep Culture No results found for: GBSANTIGEN           External Prenatal Results     Pregnancy Outside Results - Transcribed From Office Records - See Scanned Records For Details     Test Value Date Time    Hgb 12.3 g/dL 18 1557    Hct 37.2 % 18 1557    ABO O  18 1505    Rh Positive  18  1505    Antibody Screen Negative  18 1505    Glucose Fasting GTT       Glucose Tolerance Test 1 hour       Glucose Tolerance Test 3 hour       Gonorrhea (discrete)       Chlamydia (discrete)       RPR Non-Reactive  18 1505    VDRL       Syphilis Antibody       Rubella 80.4 IU/mL 18 1505      Immune  18 1505    HBsAg Non-Reactive  18 1505    Herpes Simplex Virus PCR       Herpes Simplex VIrus Culture       HIV Non-Reactive  18 1505    Hep C RNA Quant PCR       Hep C Antibody Non-Reactive  18 1505    AFP       Group B Strep       GBS Susceptibility to Clindamycin       GBS Susceptibility to Erythromycin       Fetal Fibronectin       Genetic Testing, Maternal Blood             Drug Screening     Test Value Date Time    Urine Drug Screen       Amphetamine Screen       Barbiturate Screen       Benzodiazepine Screen       Methadone Screen       Phencyclidine Screen       Opiates Screen       THC Screen       Cocaine Screen       Propoxyphene Screen       Buprenorphine Screen       Methamphetamine Screen       Oxycodone Screen       Tricyclic Antidepressants Screen                     Past OB History:     Obstetric History       T2      L3     SAB0   TAB0   Ectopic0   Molar0   Multiple1   Live Births3       # Outcome Date GA Lbr Danish/2nd Weight Sex Delivery Anes PTL Lv   3 Current            2A Term 16 38w0d  2750 g (6 lb 1 oz) M Vag-Spont EPI N CHEMA      Complications: Status post induction of labor      Apgar1:  8                Apgar5: 9   2B Term 16 38w0d  2580 g (5 lb 11 oz) F Vag-Spont EPI N CHEMA      Apgar1:  6                Apgar5: 8   1 Term 12 41w0d  3600 g (7 lb 15 oz) M Vag-Spont EPI N CHEMA      Complications: Status post induction of labor          Past Medical History: Past Medical History:   Diagnosis Date   • Asthma    • Eczema of both hands    • Gestational diabetes     previous pregnancy       Past Surgical History No past surgical  history on file.   Family History: Family History   Problem Relation Age of Onset   • Stroke Father    • Thyroid disease Mother    • Breast cancer Paternal Grandmother    • Cervical cancer Paternal Grandmother    • Diabetes Paternal Grandmother    • Uterine cancer Paternal Grandmother    • Ovarian cancer Neg Hx    • Colon cancer Neg Hx       Social History:  reports that  has never smoked. she has never used smokeless tobacco.   reports that she does not drink alcohol.   reports that she does not use drugs.        Review of Systems  Denies HA CP, Shortness of air, muscle weakness,                                             and rashes      Objective     Vital Signs Range for the last 24 hours  Temperature: Temp:  [98.2 °F (36.8 °C)] 98.2 °F (36.8 °C)   Temp Source: Temp src: Oral   BP: BP: (128)/(76) 128/76   Pulse: Heart Rate:  [88] 88   Respirations: Resp:  [18] 18   SPO2:     O2 Amount (l/min):     O2 Devices     Weight: Weight:  [94.3 kg (208 lb)] 94.3 kg (208 lb)     Physical Examination: General appearance - alert, well appearing, and in no distress   Chest - clear to auscultation, no wheezes, rales or rhonchi, symmetric air entry  Heart - S1 and S2 normal, no murmurs noted  Abdomen - soft, nontender, nondistended, no masses or organomegaly  no rebound tenderness noted  bowel sounds normal  No guarding, No RUQ pain, No CVA tenderness.   Some tenderness at her PSIC  Extremities - +1 DTR        Cervix: Exam by:  RDaltonNDalton   Dilation:  Closed   Effacement:  thick   Station:  high     Fetal Heart Rate Assessment   Method: Fetal HR Assessment Method: external   Beats/min: Fetal HR (beats/min): 130   Baseline: Fetal Heart Baseline Rate: normal range   Varibility: Fetal HR Variability: moderate (amplitude range 6 to 25 bpm)   Accels: Fetal HR Accelerations: greater than/equal to 15 bpm, lasting at least 15 seconds   Decels: Fetal HR Decelerations: absent   Tracing Category:       Uterine Assessment   Method: Method:  external tocotransducer   Frequency (min): Contraction Frequency (Minutes): 4-6   Ctx Count in 10 min:     Duration:     Intensity: Contraction Intensity: mild by palpation   Intensity by IUPC:     Resting Tone: Uterine Resting Tone: soft by palpation   Resting Tone by IUPC:     Deep Gap Units:      NST                     Indication:   Triage  Start time : 01:40                 End time: 02:00  15 x 15 accels x 2  Yes  No decels  Baseline  130s  Reactive NST - Yes     Laboratory Results:   Lab Results   Component Value Date    TSH 0.194 (L) 05/23/2018    FREET4 1.51 06/06/2018     Lab Results   Component Value Date    SPECGRAVUR 1.017 12/20/2018    KETONESU Negative 12/20/2018    BLOODU Negative 12/20/2018    LEUKOCYTESUR Negative 12/20/2018    NITRITEU Negative 12/20/2018             Assessment:  1. Intrauterine pregnancy at 38w5d weeks gestation with reactive fetal status  2.  Musculoskeletal pain related to pregnancy    Plan:  1. Reassuring fetal status  2. No signs or symptoms of labour or urinary concerns  3. M/S pain related to pregnancy - 10 mg IM morphine, PO Phenergan.  4.  Reviewed guidelines.   Answered questions  5.  D/C home      Kelby Pierce MD  12/20/2018  2:21 AM

## 2018-12-22 LAB — BACTERIA SPEC AEROBE CULT: NORMAL

## 2018-12-24 ENCOUNTER — HOSPITAL ENCOUNTER (INPATIENT)
Facility: HOSPITAL | Age: 23
LOS: 2 days | Discharge: HOME | End: 2018-12-26
Attending: OBSTETRICS & GYNECOLOGY | Admitting: OBSTETRICS & GYNECOLOGY

## 2018-12-24 PROBLEM — Z37.9 NORMAL LABOR: Status: ACTIVE | Noted: 2018-12-24

## 2018-12-24 LAB
ABO GROUP BLD: NORMAL
BLD GP AB SCN SERPL QL: NEGATIVE
DEPRECATED RDW RBC AUTO: 44.3 FL (ref 37–54)
ERYTHROCYTE [DISTWIDTH] IN BLOOD BY AUTOMATED COUNT: 13.6 % (ref 11.3–14.5)
HCT VFR BLD AUTO: 43 % (ref 34.5–44)
HGB BLD-MCNC: 14.3 G/DL (ref 11.5–15.5)
MCH RBC QN AUTO: 29.7 PG (ref 27–31)
MCHC RBC AUTO-ENTMCNC: 33.3 G/DL (ref 32–36)
MCV RBC AUTO: 89.2 FL (ref 80–99)
PLATELET # BLD AUTO: 166 10*3/MM3 (ref 150–450)
PMV BLD AUTO: 13 FL (ref 6–12)
RBC # BLD AUTO: 4.82 10*6/MM3 (ref 3.89–5.14)
RH BLD: POSITIVE
T&S EXPIRATION DATE: NORMAL
WBC NRBC COR # BLD: 9.78 10*3/MM3 (ref 3.5–10.8)

## 2018-12-24 PROCEDURE — 85027 COMPLETE CBC AUTOMATED: CPT | Performed by: OBSTETRICS & GYNECOLOGY

## 2018-12-24 PROCEDURE — 86901 BLOOD TYPING SEROLOGIC RH(D): CPT | Performed by: OBSTETRICS & GYNECOLOGY

## 2018-12-24 PROCEDURE — 86900 BLOOD TYPING SEROLOGIC ABO: CPT | Performed by: OBSTETRICS & GYNECOLOGY

## 2018-12-24 PROCEDURE — 86850 RBC ANTIBODY SCREEN: CPT | Performed by: OBSTETRICS & GYNECOLOGY

## 2018-12-24 RX ORDER — MAGNESIUM CARB/ALUMINUM HYDROX 105-160MG
30 TABLET,CHEWABLE ORAL ONCE
Status: DISCONTINUED | OUTPATIENT
Start: 2018-12-24 | End: 2018-12-25 | Stop reason: HOSPADM

## 2018-12-24 RX ORDER — SODIUM CHLORIDE 0.9 % (FLUSH) 0.9 %
3-10 SYRINGE (ML) INJECTION AS NEEDED
Status: DISCONTINUED | OUTPATIENT
Start: 2018-12-24 | End: 2018-12-25 | Stop reason: HOSPADM

## 2018-12-24 RX ORDER — LIDOCAINE HYDROCHLORIDE 10 MG/ML
5 INJECTION, SOLUTION EPIDURAL; INFILTRATION; INTRACAUDAL; PERINEURAL AS NEEDED
Status: DISCONTINUED | OUTPATIENT
Start: 2018-12-24 | End: 2018-12-25 | Stop reason: HOSPADM

## 2018-12-24 RX ORDER — ACETAMINOPHEN 325 MG/1
650 TABLET ORAL EVERY 4 HOURS PRN
Status: DISCONTINUED | OUTPATIENT
Start: 2018-12-24 | End: 2018-12-25 | Stop reason: HOSPADM

## 2018-12-24 RX ORDER — BUTORPHANOL TARTRATE 1 MG/ML
2 INJECTION, SOLUTION INTRAMUSCULAR; INTRAVENOUS
Status: DISCONTINUED | OUTPATIENT
Start: 2018-12-24 | End: 2018-12-25 | Stop reason: HOSPADM

## 2018-12-24 RX ORDER — SODIUM CHLORIDE 0.9 % (FLUSH) 0.9 %
3 SYRINGE (ML) INJECTION EVERY 12 HOURS SCHEDULED
Status: DISCONTINUED | OUTPATIENT
Start: 2018-12-24 | End: 2018-12-25 | Stop reason: HOSPADM

## 2018-12-24 RX ORDER — ONDANSETRON 4 MG/1
4 TABLET, FILM COATED ORAL EVERY 6 HOURS PRN
Status: DISCONTINUED | OUTPATIENT
Start: 2018-12-24 | End: 2018-12-25 | Stop reason: HOSPADM

## 2018-12-24 RX ORDER — ONDANSETRON 2 MG/ML
4 INJECTION INTRAMUSCULAR; INTRAVENOUS EVERY 6 HOURS PRN
Status: DISCONTINUED | OUTPATIENT
Start: 2018-12-24 | End: 2018-12-25 | Stop reason: HOSPADM

## 2018-12-24 RX ORDER — SODIUM CHLORIDE, SODIUM LACTATE, POTASSIUM CHLORIDE, CALCIUM CHLORIDE 600; 310; 30; 20 MG/100ML; MG/100ML; MG/100ML; MG/100ML
125 INJECTION, SOLUTION INTRAVENOUS CONTINUOUS
Status: DISCONTINUED | OUTPATIENT
Start: 2018-12-24 | End: 2018-12-26 | Stop reason: HOSPADM

## 2018-12-24 RX ORDER — BUTORPHANOL TARTRATE 1 MG/ML
1 INJECTION, SOLUTION INTRAMUSCULAR; INTRAVENOUS
Status: DISCONTINUED | OUTPATIENT
Start: 2018-12-24 | End: 2018-12-25 | Stop reason: HOSPADM

## 2018-12-24 RX ADMIN — SODIUM CHLORIDE, POTASSIUM CHLORIDE, SODIUM LACTATE AND CALCIUM CHLORIDE 125 ML/HR: 600; 310; 30; 20 INJECTION, SOLUTION INTRAVENOUS at 23:25

## 2018-12-25 PROCEDURE — 59410 OBSTETRICAL CARE: CPT | Performed by: OBSTETRICS & GYNECOLOGY

## 2018-12-25 PROCEDURE — 59025 FETAL NON-STRESS TEST: CPT

## 2018-12-25 RX ORDER — METHYLERGONOVINE MALEATE 0.2 MG/ML
200 INJECTION INTRAVENOUS ONCE AS NEEDED
Status: DISCONTINUED | OUTPATIENT
Start: 2018-12-25 | End: 2018-12-25 | Stop reason: HOSPADM

## 2018-12-25 RX ORDER — BISACODYL 10 MG
10 SUPPOSITORY, RECTAL RECTAL DAILY PRN
Status: DISCONTINUED | OUTPATIENT
Start: 2018-12-26 | End: 2018-12-26 | Stop reason: HOSPADM

## 2018-12-25 RX ORDER — DOCUSATE SODIUM 100 MG/1
100 CAPSULE, LIQUID FILLED ORAL 2 TIMES DAILY PRN
Status: DISCONTINUED | OUTPATIENT
Start: 2018-12-25 | End: 2018-12-26 | Stop reason: HOSPADM

## 2018-12-25 RX ORDER — OXYTOCIN-SODIUM CHLORIDE 0.9% IV SOLN 30 UNIT/500ML 30-0.9/5 UT/ML-%
650 SOLUTION INTRAVENOUS ONCE
Status: COMPLETED | OUTPATIENT
Start: 2018-12-25 | End: 2018-12-25

## 2018-12-25 RX ORDER — MISOPROSTOL 200 UG/1
800 TABLET ORAL AS NEEDED
Status: DISCONTINUED | OUTPATIENT
Start: 2018-12-25 | End: 2018-12-25 | Stop reason: HOSPADM

## 2018-12-25 RX ORDER — OXYTOCIN-SODIUM CHLORIDE 0.9% IV SOLN 30 UNIT/500ML 30-0.9/5 UT/ML-%
85 SOLUTION INTRAVENOUS ONCE
Status: COMPLETED | OUTPATIENT
Start: 2018-12-25 | End: 2018-12-25

## 2018-12-25 RX ORDER — HYDROCODONE BITARTRATE AND ACETAMINOPHEN 5; 325 MG/1; MG/1
1 TABLET ORAL EVERY 4 HOURS PRN
Status: DISCONTINUED | OUTPATIENT
Start: 2018-12-25 | End: 2018-12-26 | Stop reason: HOSPADM

## 2018-12-25 RX ORDER — LANOLIN 100 %
OINTMENT (GRAM) TOPICAL
Status: DISCONTINUED | OUTPATIENT
Start: 2018-12-25 | End: 2018-12-26 | Stop reason: HOSPADM

## 2018-12-25 RX ORDER — IBUPROFEN 600 MG/1
600 TABLET ORAL EVERY 6 HOURS PRN
Status: DISCONTINUED | OUTPATIENT
Start: 2018-12-25 | End: 2018-12-26 | Stop reason: HOSPADM

## 2018-12-25 RX ORDER — CARBOPROST TROMETHAMINE 250 UG/ML
250 INJECTION, SOLUTION INTRAMUSCULAR AS NEEDED
Status: DISCONTINUED | OUTPATIENT
Start: 2018-12-25 | End: 2018-12-25 | Stop reason: HOSPADM

## 2018-12-25 RX ADMIN — IBUPROFEN 600 MG: 600 TABLET ORAL at 03:47

## 2018-12-25 RX ADMIN — WITCH HAZEL 1 PAD: 500 SOLUTION RECTAL; TOPICAL at 03:47

## 2018-12-25 RX ADMIN — OXYTOCIN 85 ML/HR: 10 INJECTION INTRAVENOUS at 01:23

## 2018-12-25 RX ADMIN — OXYTOCIN 650 ML/HR: 10 INJECTION INTRAVENOUS at 00:53

## 2018-12-25 RX ADMIN — Medication 3 APPLICATION: at 03:47

## 2018-12-26 VITALS
TEMPERATURE: 98.4 F | RESPIRATION RATE: 14 BRPM | BODY MASS INDEX: 35.51 KG/M2 | HEART RATE: 77 BPM | HEIGHT: 64 IN | DIASTOLIC BLOOD PRESSURE: 58 MMHG | SYSTOLIC BLOOD PRESSURE: 105 MMHG | WEIGHT: 208 LBS

## 2018-12-26 LAB
HCT VFR BLD AUTO: 39.3 % (ref 34.5–44)
HGB BLD-MCNC: 12.5 G/DL (ref 11.5–15.5)

## 2018-12-26 PROCEDURE — 99024 POSTOP FOLLOW-UP VISIT: CPT | Performed by: OBSTETRICS & GYNECOLOGY

## 2018-12-26 PROCEDURE — 85014 HEMATOCRIT: CPT | Performed by: OBSTETRICS & GYNECOLOGY

## 2018-12-26 PROCEDURE — 90715 TDAP VACCINE 7 YRS/> IM: CPT | Performed by: OBSTETRICS & GYNECOLOGY

## 2018-12-26 PROCEDURE — 90471 IMMUNIZATION ADMIN: CPT | Performed by: OBSTETRICS & GYNECOLOGY

## 2018-12-26 PROCEDURE — 25010000002 TDAP 5-2.5-18.5 LF-MCG/0.5 SUSPENSION: Performed by: OBSTETRICS & GYNECOLOGY

## 2018-12-26 PROCEDURE — 85018 HEMOGLOBIN: CPT | Performed by: OBSTETRICS & GYNECOLOGY

## 2018-12-26 RX ORDER — PRENATAL VIT/IRON FUM/FOLIC AC 27MG-0.8MG
1 TABLET ORAL DAILY
Status: DISCONTINUED | OUTPATIENT
Start: 2018-12-26 | End: 2018-12-26 | Stop reason: HOSPADM

## 2018-12-26 RX ORDER — CETIRIZINE HYDROCHLORIDE 10 MG/1
10 TABLET ORAL DAILY
Status: DISCONTINUED | OUTPATIENT
Start: 2018-12-26 | End: 2018-12-26 | Stop reason: HOSPADM

## 2018-12-26 RX ADMIN — BENZOCAINE 1 APPLICATION: 5.6 OINTMENT TOPICAL at 07:34

## 2018-12-26 RX ADMIN — CETIRIZINE HYDROCHLORIDE 10 MG: 10 TABLET, FILM COATED ORAL at 00:46

## 2018-12-26 RX ADMIN — IBUPROFEN 600 MG: 600 TABLET ORAL at 07:34

## 2018-12-26 RX ADMIN — PRENATAL VIT W/ FE FUMARATE-FA TAB 27-0.8 MG 1 TABLET: 27-0.8 TAB at 00:46

## 2018-12-26 RX ADMIN — WITCH HAZEL 1 PAD: 500 SOLUTION RECTAL; TOPICAL at 07:33

## 2018-12-26 RX ADMIN — TETANUS TOXOID, REDUCED DIPHTHERIA TOXOID AND ACELLULAR PERTUSSIS VACCINE, ADSORBED 0.5 ML: 5; 2.5; 8; 8; 2.5 SUSPENSION INTRAMUSCULAR at 00:46

## 2019-01-07 ENCOUNTER — APPOINTMENT (OUTPATIENT)
Dept: CT IMAGING | Facility: HOSPITAL | Age: 24
End: 2019-01-07

## 2019-01-07 ENCOUNTER — HOSPITAL ENCOUNTER (EMERGENCY)
Facility: HOSPITAL | Age: 24
Discharge: HOME | End: 2019-01-07
Attending: EMERGENCY MEDICINE | Admitting: EMERGENCY MEDICINE

## 2019-01-07 VITALS
WEIGHT: 183 LBS | OXYGEN SATURATION: 96 % | TEMPERATURE: 99.7 F | DIASTOLIC BLOOD PRESSURE: 86 MMHG | SYSTOLIC BLOOD PRESSURE: 127 MMHG | BODY MASS INDEX: 31.24 KG/M2 | HEIGHT: 64 IN | HEART RATE: 100 BPM | RESPIRATION RATE: 16 BRPM

## 2019-01-07 DIAGNOSIS — N20.1 URETERAL CALCULUS, RIGHT: Primary | ICD-10-CM

## 2019-01-07 LAB
ALBUMIN SERPL-MCNC: 4.27 G/DL (ref 3.2–4.8)
ALBUMIN/GLOB SERPL: 1.9 G/DL (ref 1.5–2.5)
ALP SERPL-CCNC: 76 U/L (ref 25–100)
ALT SERPL W P-5'-P-CCNC: 26 U/L (ref 7–40)
ANION GAP SERPL CALCULATED.3IONS-SCNC: 9 MMOL/L (ref 3–11)
AST SERPL-CCNC: 21 U/L (ref 0–33)
B-HCG UR QL: NEGATIVE
BACTERIA UR QL AUTO: ABNORMAL /HPF
BASOPHILS # BLD AUTO: 0.01 10*3/MM3 (ref 0–0.2)
BASOPHILS NFR BLD AUTO: 0.1 % (ref 0–1)
BILIRUB SERPL-MCNC: 0.4 MG/DL (ref 0.3–1.2)
BILIRUB UR QL STRIP: NEGATIVE
BUN BLD-MCNC: 16 MG/DL (ref 9–23)
BUN/CREAT SERPL: 15.5 (ref 7–25)
CALCIUM SPEC-SCNC: 8.9 MG/DL (ref 8.7–10.4)
CHLORIDE SERPL-SCNC: 108 MMOL/L (ref 99–109)
CLARITY UR: ABNORMAL
CO2 SERPL-SCNC: 22 MMOL/L (ref 20–31)
COLOR UR: YELLOW
CREAT BLD-MCNC: 1.03 MG/DL (ref 0.6–1.3)
DEPRECATED RDW RBC AUTO: 42 FL (ref 37–54)
EOSINOPHIL # BLD AUTO: 0.29 10*3/MM3 (ref 0–0.3)
EOSINOPHIL NFR BLD AUTO: 3.1 % (ref 0–3)
ERYTHROCYTE [DISTWIDTH] IN BLOOD BY AUTOMATED COUNT: 13 % (ref 11.3–14.5)
GFR SERPL CREATININE-BSD FRML MDRD: 65 ML/MIN/1.73
GLOBULIN UR ELPH-MCNC: 2.2 GM/DL
GLUCOSE BLD-MCNC: 90 MG/DL (ref 70–100)
GLUCOSE UR STRIP-MCNC: NEGATIVE MG/DL
HCT VFR BLD AUTO: 43.4 % (ref 34.5–44)
HGB BLD-MCNC: 14.3 G/DL (ref 11.5–15.5)
HGB UR QL STRIP.AUTO: ABNORMAL
HYALINE CASTS UR QL AUTO: ABNORMAL /LPF
IMM GRANULOCYTES # BLD AUTO: 0.02 10*3/MM3 (ref 0–0.03)
IMM GRANULOCYTES NFR BLD AUTO: 0.2 % (ref 0–0.6)
INTERNAL NEGATIVE CONTROL: NEGATIVE
INTERNAL POSITIVE CONTROL: POSITIVE
KETONES UR QL STRIP: NEGATIVE
LEUKOCYTE ESTERASE UR QL STRIP.AUTO: ABNORMAL
LIPASE SERPL-CCNC: 35 U/L (ref 6–51)
LYMPHOCYTES # BLD AUTO: 1.98 10*3/MM3 (ref 0.6–4.8)
LYMPHOCYTES NFR BLD AUTO: 21 % (ref 24–44)
Lab: NORMAL
MCH RBC QN AUTO: 29.1 PG (ref 27–31)
MCHC RBC AUTO-ENTMCNC: 32.9 G/DL (ref 32–36)
MCV RBC AUTO: 88.4 FL (ref 80–99)
MONOCYTES # BLD AUTO: 0.52 10*3/MM3 (ref 0–1)
MONOCYTES NFR BLD AUTO: 5.5 % (ref 0–12)
NEUTROPHILS # BLD AUTO: 6.61 10*3/MM3 (ref 1.5–8.3)
NEUTROPHILS NFR BLD AUTO: 70.3 % (ref 41–71)
NITRITE UR QL STRIP: NEGATIVE
PH UR STRIP.AUTO: <=5 [PH] (ref 5–8)
PLATELET # BLD AUTO: 167 10*3/MM3 (ref 150–450)
PMV BLD AUTO: 12 FL (ref 6–12)
POTASSIUM BLD-SCNC: 3.5 MMOL/L (ref 3.5–5.5)
PROT SERPL-MCNC: 6.5 G/DL (ref 5.7–8.2)
PROT UR QL STRIP: ABNORMAL
RBC # BLD AUTO: 4.91 10*6/MM3 (ref 3.89–5.14)
RBC # UR: ABNORMAL /HPF
REF LAB TEST METHOD: ABNORMAL
SODIUM BLD-SCNC: 139 MMOL/L (ref 132–146)
SP GR UR STRIP: 1.01 (ref 1–1.03)
SQUAMOUS #/AREA URNS HPF: ABNORMAL /HPF
UROBILINOGEN UR QL STRIP: ABNORMAL
WBC NRBC COR # BLD: 9.41 10*3/MM3 (ref 3.5–10.8)
WBC UR QL AUTO: ABNORMAL /HPF

## 2019-01-07 PROCEDURE — 96361 HYDRATE IV INFUSION ADD-ON: CPT

## 2019-01-07 PROCEDURE — 96365 THER/PROPH/DIAG IV INF INIT: CPT

## 2019-01-07 PROCEDURE — 99284 EMERGENCY DEPT VISIT MOD MDM: CPT

## 2019-01-07 PROCEDURE — 85025 COMPLETE CBC W/AUTO DIFF WBC: CPT | Performed by: NURSE PRACTITIONER

## 2019-01-07 PROCEDURE — 80053 COMPREHEN METABOLIC PANEL: CPT | Performed by: NURSE PRACTITIONER

## 2019-01-07 PROCEDURE — 25010000002 ONDANSETRON PER 1 MG: Performed by: EMERGENCY MEDICINE

## 2019-01-07 PROCEDURE — 25010000002 HYDROMORPHONE 1 MG/ML SOLUTION: Performed by: EMERGENCY MEDICINE

## 2019-01-07 PROCEDURE — 25010000002 CEFTRIAXONE PER 250 MG: Performed by: EMERGENCY MEDICINE

## 2019-01-07 PROCEDURE — 74176 CT ABD & PELVIS W/O CONTRAST: CPT

## 2019-01-07 PROCEDURE — 81025 URINE PREGNANCY TEST: CPT | Performed by: NURSE PRACTITIONER

## 2019-01-07 PROCEDURE — 96375 TX/PRO/DX INJ NEW DRUG ADDON: CPT

## 2019-01-07 PROCEDURE — 96376 TX/PRO/DX INJ SAME DRUG ADON: CPT

## 2019-01-07 PROCEDURE — 81001 URINALYSIS AUTO W/SCOPE: CPT | Performed by: NURSE PRACTITIONER

## 2019-01-07 PROCEDURE — 83690 ASSAY OF LIPASE: CPT | Performed by: NURSE PRACTITIONER

## 2019-01-07 RX ORDER — CEPHALEXIN 500 MG/1
500 CAPSULE ORAL 2 TIMES DAILY
Qty: 14 CAPSULE | Refills: 0 | Status: SHIPPED | OUTPATIENT
Start: 2019-01-07 | End: 2019-02-05

## 2019-01-07 RX ORDER — CEFTRIAXONE SODIUM 1 G/50ML
1 INJECTION, SOLUTION INTRAVENOUS ONCE
Status: COMPLETED | OUTPATIENT
Start: 2019-01-07 | End: 2019-01-07

## 2019-01-07 RX ORDER — SODIUM CHLORIDE 0.9 % (FLUSH) 0.9 %
10 SYRINGE (ML) INJECTION AS NEEDED
Status: DISCONTINUED | OUTPATIENT
Start: 2019-01-07 | End: 2019-01-08 | Stop reason: HOSPADM

## 2019-01-07 RX ORDER — ONDANSETRON 2 MG/ML
4 INJECTION INTRAMUSCULAR; INTRAVENOUS ONCE
Status: COMPLETED | OUTPATIENT
Start: 2019-01-07 | End: 2019-01-07

## 2019-01-07 RX ORDER — OXYCODONE HYDROCHLORIDE AND ACETAMINOPHEN 5; 325 MG/1; MG/1
1 TABLET ORAL EVERY 8 HOURS PRN
Qty: 12 TABLET | Refills: 0 | Status: SHIPPED | OUTPATIENT
Start: 2019-01-07 | End: 2019-02-05

## 2019-01-07 RX ADMIN — HYDROMORPHONE HYDROCHLORIDE 1 MG: 1 INJECTION, SOLUTION INTRAMUSCULAR; INTRAVENOUS; SUBCUTANEOUS at 21:19

## 2019-01-07 RX ADMIN — HYDROMORPHONE HYDROCHLORIDE 1 MG: 1 INJECTION, SOLUTION INTRAMUSCULAR; INTRAVENOUS; SUBCUTANEOUS at 23:15

## 2019-01-07 RX ADMIN — SODIUM CHLORIDE 1000 ML: 9 INJECTION, SOLUTION INTRAVENOUS at 21:19

## 2019-01-07 RX ADMIN — ONDANSETRON 4 MG: 2 INJECTION INTRAMUSCULAR; INTRAVENOUS at 21:19

## 2019-01-07 RX ADMIN — CEFTRIAXONE SODIUM 1 G: 1 INJECTION, SOLUTION INTRAVENOUS at 23:17

## 2019-01-08 ENCOUNTER — POSTPARTUM VISIT (OUTPATIENT)
Dept: OBSTETRICS AND GYNECOLOGY | Facility: CLINIC | Age: 24
End: 2019-01-08

## 2019-01-08 VITALS
WEIGHT: 188.6 LBS | HEIGHT: 64 IN | BODY MASS INDEX: 32.2 KG/M2 | SYSTOLIC BLOOD PRESSURE: 122 MMHG | DIASTOLIC BLOOD PRESSURE: 78 MMHG

## 2019-01-08 DIAGNOSIS — N76.0 VAGINAL INFECTION: ICD-10-CM

## 2019-01-08 DIAGNOSIS — N20.0 KIDNEY STONES: ICD-10-CM

## 2019-01-08 PROCEDURE — 0503F POSTPARTUM CARE VISIT: CPT | Performed by: OBSTETRICS & GYNECOLOGY

## 2019-01-08 RX ORDER — ONDANSETRON 4 MG/1
TABLET, ORALLY DISINTEGRATING ORAL
COMMUNITY
Start: 2019-01-06 | End: 2019-02-05

## 2019-01-08 RX ORDER — FLUCONAZOLE 150 MG/1
150 TABLET ORAL ONCE
Qty: 1 TABLET | Refills: 3 | Status: SHIPPED | OUTPATIENT
Start: 2019-01-08 | End: 2019-01-08

## 2019-01-08 NOTE — ED PROVIDER NOTES
"Chris De La Fuente is a 23 y.o. female who presents to the emergency department with complaints of right flank pain that started 2 days ago that has become more severe today. The patient states that walking increases her pain. She has associated symptoms of nausea, increase in frequency and urgency of urination, and decreased appetite. The patient mentions that she was given Flomax which helped her \"pee better\". She was evaluated at an outside facility that diagnosed the patient with a kidney stone and prescribed her Percocet to last until she could make an appointment to see a Urologist today. The patient was given Morphine, Dilaudid and Toradol at the outside facility and mentions that Morphine did not help and the Dilaudid helped with her pain.The patient states that she called and the one they referred her to does not take her insurance. The patients pain today is a 8-9/10. The patient had a vaginal delivery 13 days ago. She has no PMHx of kidney stones. She denies any vomiting, fever, hematuria, vaginal bleeding, SOA, and any other acute symptoms at this time.         History provided by:  Patient  Flank Pain   Pain location:  R flank  Pain radiates to:  Does not radiate  Pain severity:  Severe  Onset quality:  Sudden  Duration:  2 days  Timing:  Constant  Progression:  Worsening  Chronicity:  New  Relieved by:  Nothing  Exacerbated by: walking.  Ineffective treatments: Morphine   Associated symptoms: nausea    Associated symptoms: no chills, no fever, no hematuria, no shortness of breath, no vaginal bleeding and no vomiting    Risk factors: obesity        Review of Systems   Constitutional: Positive for appetite change. Negative for chills and fever.   Respiratory: Negative for shortness of breath.    Gastrointestinal: Positive for nausea. Negative for vomiting.   Genitourinary: Positive for flank pain (right), frequency and urgency. Negative for hematuria and vaginal bleeding.   All other systems " reviewed and are negative.      Past Medical History:   Diagnosis Date   • Asthma    • Eczema of both hands    • Gestational diabetes     previous pregnancy        Allergies   Allergen Reactions   • Corticosteroids Hives     With liquid version, has safely taken pill form   • Prednisone Hives     Liquid prednisone       History reviewed. No pertinent surgical history.    Family History   Problem Relation Age of Onset   • Stroke Father    • Thyroid disease Mother    • Breast cancer Paternal Grandmother    • Cervical cancer Paternal Grandmother    • Diabetes Paternal Grandmother    • Uterine cancer Paternal Grandmother    • Ovarian cancer Neg Hx    • Colon cancer Neg Hx        Social History     Socioeconomic History   • Marital status:      Spouse name: Not on file   • Number of children: Not on file   • Years of education: Not on file   • Highest education level: Not on file   Tobacco Use   • Smoking status: Never Smoker   • Smokeless tobacco: Never Used   Substance and Sexual Activity   • Alcohol use: No   • Drug use: No   • Sexual activity: Yes     Partners: Male         Objective   Physical Exam   Constitutional: She is oriented to person, place, and time. She appears well-developed and well-nourished. She is cooperative.  Non-toxic appearance. She appears distressed.   Pt is uncomfortable in bed at this time.    HENT:   Head: Normocephalic and atraumatic.   Right Ear: External ear normal.   Left Ear: External ear normal.   Mouth/Throat: Oropharynx is clear and moist. No oropharyngeal exudate.   Eyes: Conjunctivae, EOM and lids are normal. Pupils are equal, round, and reactive to light.   Neck: Trachea normal, normal range of motion and full passive range of motion without pain. Neck supple.   Cardiovascular: Regular rhythm, normal heart sounds, intact distal pulses and normal pulses.   Pulmonary/Chest: Effort normal and breath sounds normal. No respiratory distress. She has no decreased breath sounds. She  has no wheezes. She has no rhonchi. She has no rales.   Abdominal: Soft. Normal appearance and bowel sounds are normal. She exhibits no distension. There is tenderness (RLQ).   Musculoskeletal: Normal range of motion.   Neurological: She is alert and oriented to person, place, and time. She has normal strength. No cranial nerve deficit.   Skin: Skin is warm, dry and intact. No rash noted.   Psychiatric: She has a normal mood and affect. Her speech is normal and behavior is normal.   Nursing note and vitals reviewed.      Procedures         ED Course  ED Course as of Jan 08 0545   Mon Jan 07, 2019   2330 I discussed the results with the patient at this time.  I discussed admission with the patient.  Patient declines at this time.  The patient has an appointment to see Dr. Ortiz in the am.  The patient will be discharged home with a prescription for Percocet.  The patient is encouraged to return to the ER if the pain worsens.  The patient agrees and verbalizes understanding.  [KG]      ED Course User Index  [KG] Yasmeen Buitrago, SHIRIN       Recent Results (from the past 24 hour(s))   Lipase    Collection Time: 01/07/19  8:58 PM   Result Value Ref Range    Lipase 35 6 - 51 U/L   Comprehensive Metabolic Panel    Collection Time: 01/07/19  8:58 PM   Result Value Ref Range    Glucose 90 70 - 100 mg/dL    BUN 16 9 - 23 mg/dL    Creatinine 1.03 0.60 - 1.30 mg/dL    Sodium 139 132 - 146 mmol/L    Potassium 3.5 3.5 - 5.5 mmol/L    Chloride 108 99 - 109 mmol/L    CO2 22.0 20.0 - 31.0 mmol/L    Calcium 8.9 8.7 - 10.4 mg/dL    Total Protein 6.5 5.7 - 8.2 g/dL    Albumin 4.27 3.20 - 4.80 g/dL    ALT (SGPT) 26 7 - 40 U/L    AST (SGOT) 21 0 - 33 U/L    Alkaline Phosphatase 76 25 - 100 U/L    Total Bilirubin 0.4 0.3 - 1.2 mg/dL    eGFR Non African Amer 65 >60 mL/min/1.73    Globulin 2.2 gm/dL    A/G Ratio 1.9 1.5 - 2.5 g/dL    BUN/Creatinine Ratio 15.5 7.0 - 25.0    Anion Gap 9.0 3.0 - 11.0 mmol/L   CBC Auto Differential     Collection Time: 01/07/19  8:58 PM   Result Value Ref Range    WBC 9.41 3.50 - 10.80 10*3/mm3    RBC 4.91 3.89 - 5.14 10*6/mm3    Hemoglobin 14.3 11.5 - 15.5 g/dL    Hematocrit 43.4 34.5 - 44.0 %    MCV 88.4 80.0 - 99.0 fL    MCH 29.1 27.0 - 31.0 pg    MCHC 32.9 32.0 - 36.0 g/dL    RDW 13.0 11.3 - 14.5 %    RDW-SD 42.0 37.0 - 54.0 fl    MPV 12.0 6.0 - 12.0 fL    Platelets 167 150 - 450 10*3/mm3    Neutrophil % 70.3 41.0 - 71.0 %    Lymphocyte % 21.0 (L) 24.0 - 44.0 %    Monocyte % 5.5 0.0 - 12.0 %    Eosinophil % 3.1 (H) 0.0 - 3.0 %    Basophil % 0.1 0.0 - 1.0 %    Immature Grans % 0.2 0.0 - 0.6 %    Neutrophils, Absolute 6.61 1.50 - 8.30 10*3/mm3    Lymphocytes, Absolute 1.98 0.60 - 4.80 10*3/mm3    Monocytes, Absolute 0.52 0.00 - 1.00 10*3/mm3    Eosinophils, Absolute 0.29 0.00 - 0.30 10*3/mm3    Basophils, Absolute 0.01 0.00 - 0.20 10*3/mm3    Immature Grans, Absolute 0.02 0.00 - 0.03 10*3/mm3   Urinalysis With Microscopic If Indicated (No Culture) - Urine, Clean Catch    Collection Time: 01/07/19  8:59 PM   Result Value Ref Range    Color, UA Yellow Yellow, Straw    Appearance, UA Cloudy (A) Clear    pH, UA <=5.0 5.0 - 8.0    Specific Gravity, UA 1.012 1.001 - 1.030    Glucose, UA Negative Negative    Ketones, UA Negative Negative    Bilirubin, UA Negative Negative    Blood, UA Large (3+) (A) Negative    Protein, UA Trace (A) Negative    Leuk Esterase, UA Large (3+) (A) Negative    Nitrite, UA Negative Negative    Urobilinogen, UA 0.2 E.U./dL 0.2 - 1.0 E.U./dL   Urinalysis, Microscopic Only - Urine, Clean Catch    Collection Time: 01/07/19  8:59 PM   Result Value Ref Range    RBC, UA Too Numerous to Count (A) None Seen, 0-2 /HPF    WBC, UA 21-30 (A) None Seen, 0-2 /HPF    Bacteria, UA Trace None Seen, Trace /HPF    Squamous Epithelial Cells, UA 0-2 None Seen, 0-2 /HPF    Hyaline Casts, UA 0-6 0 - 6 /LPF    Methodology Manual Light Microscopy    POCT Pregnancy, Urine    Collection Time: 01/07/19  9:03 PM   Result  Value Ref Range    HCG, Urine, QL Negative Negative    Lot Number [id number]     Internal Positive Control Positive     Internal Negative Control Negative      Note: In addition to lab results from this visit, the labs listed above may include labs taken at another facility or during a different encounter within the last 24 hours. Please correlate lab times with ED admission and discharge times for further clarification of the services performed during this visit.    CT Abdomen Pelvis Without Contrast   Final Result   Moderately severe right hydroureteronephrosis secondary to a 4 x 3 mm right UVJ    stone       THIS DOCUMENT HAS BEEN ELECTRONICALLY SIGNED BY KADIE LONDON MD        Vitals:    01/07/19 2029 01/07/19 2145 01/07/19 2146 01/07/19 2330   BP:  143/92  127/86   BP Location:       Patient Position:       Pulse:       Resp:       Temp:   99.7 °F (37.6 °C)    TempSrc:   Oral    SpO2: 98% 96%  96%   Weight:       Height:         Medications   sodium chloride 0.9 % bolus 1,000 mL (0 mL Intravenous Stopped 1/7/19 2312)   HYDROmorphone (DILAUDID) injection 1 mg (1 mg Intravenous Given 1/7/19 2119)   ondansetron (ZOFRAN) injection 4 mg (4 mg Intravenous Given 1/7/19 2119)   cefTRIAXone (ROCEPHIN) IVPB 1 g (0 g Intravenous Stopped 1/7/19 2347)   HYDROmorphone (DILAUDID) injection 1 mg (1 mg Intravenous Given 1/7/19 2315)     ECG/EMG Results (last 24 hours)     ** No results found for the last 24 hours. **                      MDM    Final diagnoses:   Ureteral calculus, right       Documentation assistance provided by namrata Gant.  Information recorded by the scribe was done at my direction and has been verified and validated by me.     Christy Gant  01/07/19 2118       Yasmeen Buitrago APRN  01/08/19 [id number]

## 2019-01-08 NOTE — PROGRESS NOTES
Subjective   Salina De La Fuente is a 23 y.o. female is here today for follow-up.    Chief Complaint   Patient presents with   • Postpartum Care     2wPP, vag del, baby girl, breastfeeding, no lacs. pt has kidney stones. pt states that she is getting a UTI from antibiotics         History of Present Illness  Patient is 2 weeks status post vaginal delivery on 12/25/2018.  She is having no postpartum problems.  She is breast-feeding and this is going well.  She has not decided on birth control method.  She has no symptoms of postpartum depression.  She has been seen in the emergency room and for sales and [place] for right kidney stone.  She is controlling the pain with Percocet.  She wants referral to urology for definitive treatment.  A shouldn't has been on antibiotics and is complaining of vaginal itching.  She would like a prescription for Diflucan for yeast infection    The following portions of the patient's history were reviewed and updated as appropriate: allergies, current medications, past family history, past medical history, past social history, past surgical history and problem list.    Review of Systems - Negative except present illness     Objective   General:  well developed; well nourished  no acute distress   Skin:  Not performed.   Thyroid: not examined   Breasts:  Not performed.   Abdomen: Not performed.   Heart: regular rate and rhythm, S1, S2 normal, no murmur, click, rub or gallop   Lungs: clear to auscultation   Pelvis: Not performed.         Assessment/Plan   Salina was seen today for postpartum care.    Diagnoses and all orders for this visit:    Postpartum care following vaginal delivery    Kidney stones  -     Ambulatory Referral to Urology    Encounter for postpartum care of lactating mother    Vaginal infection  -     fluconazole (DIFLUCAN) 150 MG tablet; Take 1 tablet by mouth 1 (One) Time for 1 dose.      Refer to urology  Continue Percocet until seen by urology  Return in 4 weeks for 6  weeks checkup    Daniel Prado MD

## 2019-02-05 ENCOUNTER — POSTPARTUM VISIT (OUTPATIENT)
Dept: OBSTETRICS AND GYNECOLOGY | Facility: CLINIC | Age: 24
End: 2019-02-05

## 2019-02-05 VITALS
SYSTOLIC BLOOD PRESSURE: 108 MMHG | BODY MASS INDEX: 30.77 KG/M2 | WEIGHT: 180.2 LBS | DIASTOLIC BLOOD PRESSURE: 74 MMHG | HEIGHT: 64 IN

## 2019-02-05 DIAGNOSIS — Z30.09 COUNSELING FOR BIRTH CONTROL, ORAL CONTRACEPTIVES: ICD-10-CM

## 2019-02-05 DIAGNOSIS — Z87.09 HISTORY OF ASTHMA: ICD-10-CM

## 2019-02-05 PROBLEM — O99.019 ANEMIA IN PREGNANCY: Status: RESOLVED | Noted: 2018-09-13 | Resolved: 2019-02-05

## 2019-02-05 PROBLEM — Z37.9 NORMAL LABOR: Status: RESOLVED | Noted: 2018-12-24 | Resolved: 2019-02-05

## 2019-02-05 PROBLEM — Z34.80 PRENATAL CARE, SUBSEQUENT PREGNANCY: Status: RESOLVED | Noted: 2018-09-13 | Resolved: 2019-02-05

## 2019-02-05 PROBLEM — O35.EXX0 FETAL HYDRONEPHROSIS DURING PREGNANCY, ANTEPARTUM: Status: RESOLVED | Noted: 2018-11-08 | Resolved: 2019-02-05

## 2019-02-05 PROCEDURE — 0503F POSTPARTUM CARE VISIT: CPT | Performed by: OBSTETRICS & GYNECOLOGY

## 2019-02-05 RX ORDER — LORATADINE 10 MG/1
10 TABLET ORAL DAILY
Qty: 30 TABLET | Refills: 6 | Status: SHIPPED | OUTPATIENT
Start: 2019-02-05 | End: 2020-02-05

## 2019-02-05 RX ORDER — ACETAMINOPHEN AND CODEINE PHOSPHATE 120; 12 MG/5ML; MG/5ML
1 SOLUTION ORAL DAILY
Qty: 28 TABLET | Refills: 12 | Status: SHIPPED | OUTPATIENT
Start: 2019-02-05 | End: 2020-02-05

## 2019-02-05 NOTE — PROGRESS NOTES
Subjective   Salina De La Fuente is a 23 y.o. female is here today for follow-up.    Chief Complaint   Patient presents with   • Postpartum Care     6w0d PP, vag del baby girl, no lacs, breastfeeding. Pt states no c/o        History of Present Illness  Patient returns for 6 weeks checkup.  She is breast-feeding and this is going well.  She wants Micronor for birth control.  She is having no postpartum depression.  The patient has no GYN problems    The following portions of the patient's history were reviewed and updated as appropriate: allergies, current medications, past family history, past medical history, past social history, past surgical history and problem list.    Review of Systems - History obtained from the patient  General ROS: negative  Psychological ROS: negative  ENT ROS: negative  Allergy and Immunology ROS: positive for - seasonal allergies  Hematological and Lymphatic ROS: negative  Endocrine ROS: negative  Breast ROS: negative for breast lumps  Respiratory ROS: no cough, shortness of breath, or wheezing  Cardiovascular ROS: no chest pain or dyspnea on exertion  Gastrointestinal ROS: no abdominal pain, change in bowel habits, or black or bloody stools  Genito-Urinary ROS: no dysuria, trouble voiding, or hematuria  Musculoskeletal ROS: negative  Neurological ROS: no TIA or stroke symptoms  Dermatological ROS: negative     Objective   General:  well developed; well nourished  no acute distress   Skin:  No suspicious lesions seen   Thyroid: not examined   Breasts:  Not performed.   Abdomen: Not performed.   Heart: regular rate and rhythm, S1, S2 normal, no murmur, click, rub or gallop   Lungs: clear to auscultation   Pelvis: Clinical staff was present for exam  External genitalia:  normal appearance of the external genitalia including Bartholin's and Martinsdale's glands.  :  urethral meatus normal;  Vaginal:  normal pink mucosa without prolapse or lesions.  Cervix:  normal appearance.  Uterus:  normal size,  shape and consistency. anteverted;  Adnexa:  normal bimanual exam of the adnexa.  Rectal:  digital rectal exam not performed; anus visually normal appearing.         Assessment/Plan   Salina was seen today for postpartum care.    Diagnoses and all orders for this visit:    Encounter for postpartum care of lactating mother    Counseling for birth control, oral contraceptives  -     norethindrone (MICRONOR) 0.35 MG tablet; Take 1 tablet by mouth Daily.    History of asthma  -     loratadine (CLARITIN) 10 MG tablet; Take 1 tablet by mouth Daily.      Return in 1 year    Daniel Prado MD

## 2020-06-10 ENCOUNTER — LAB (OUTPATIENT)
Dept: LAB | Facility: HOSPITAL | Age: 25
End: 2020-06-10

## 2020-06-10 ENCOUNTER — INITIAL PRENATAL (OUTPATIENT)
Dept: OBSTETRICS AND GYNECOLOGY | Facility: CLINIC | Age: 25
End: 2020-06-10

## 2020-06-10 VITALS — WEIGHT: 157.4 LBS | BODY MASS INDEX: 27.02 KG/M2 | DIASTOLIC BLOOD PRESSURE: 60 MMHG | SYSTOLIC BLOOD PRESSURE: 102 MMHG

## 2020-06-10 DIAGNOSIS — Z34.81 PRENATAL CARE, SUBSEQUENT PREGNANCY, FIRST TRIMESTER: ICD-10-CM

## 2020-06-10 DIAGNOSIS — Z34.81 PRENATAL CARE, SUBSEQUENT PREGNANCY, FIRST TRIMESTER: Primary | ICD-10-CM

## 2020-06-10 LAB
ABO GROUP BLD: NORMAL
BASOPHILS # BLD AUTO: 0.01 10*3/MM3 (ref 0–0.2)
BASOPHILS NFR BLD AUTO: 0.1 % (ref 0–1.5)
BLD GP AB SCN SERPL QL: NEGATIVE
C TRACH RRNA SPEC DONR QL NAA+PROBE: NEGATIVE
DEPRECATED RDW RBC AUTO: 39.9 FL (ref 37–54)
EOSINOPHIL # BLD AUTO: 0.08 10*3/MM3 (ref 0–0.4)
EOSINOPHIL NFR BLD AUTO: 1.1 % (ref 0.3–6.2)
ERYTHROCYTE [DISTWIDTH] IN BLOOD BY AUTOMATED COUNT: 12 % (ref 12.3–15.4)
HBA1C MFR BLD: 5.1 % (ref 4.8–5.6)
HBV SURFACE AG SERPL QL IA: NORMAL
HCT VFR BLD AUTO: 39.1 % (ref 34–46.6)
HCV AB SER DONR QL: NORMAL
HGB BLD-MCNC: 13.1 G/DL (ref 12–15.9)
HIV1+2 AB SER QL: NORMAL
IMM GRANULOCYTES # BLD AUTO: 0.03 10*3/MM3 (ref 0–0.05)
IMM GRANULOCYTES NFR BLD AUTO: 0.4 % (ref 0–0.5)
LYMPHOCYTES # BLD AUTO: 1.93 10*3/MM3 (ref 0.7–3.1)
LYMPHOCYTES NFR BLD AUTO: 25.7 % (ref 19.6–45.3)
MCH RBC QN AUTO: 30.2 PG (ref 26.6–33)
MCHC RBC AUTO-ENTMCNC: 33.5 G/DL (ref 31.5–35.7)
MCV RBC AUTO: 90.1 FL (ref 79–97)
MONOCYTES # BLD AUTO: 0.42 10*3/MM3 (ref 0.1–0.9)
MONOCYTES NFR BLD AUTO: 5.6 % (ref 5–12)
N GONORRHOEA DNA SPEC QL NAA+PROBE: NEGATIVE
NEUTROPHILS # BLD AUTO: 5.05 10*3/MM3 (ref 1.7–7)
NEUTROPHILS NFR BLD AUTO: 67.1 % (ref 42.7–76)
NRBC BLD AUTO-RTO: 0 /100 WBC (ref 0–0.2)
PLATELET # BLD AUTO: 212 10*3/MM3 (ref 140–450)
PMV BLD AUTO: 11.7 FL (ref 6–12)
RBC # BLD AUTO: 4.34 10*6/MM3 (ref 3.77–5.28)
RH BLD: POSITIVE
RUBV IGG SERPL IA-ACNC: POSITIVE
TSH SERPL DL<=0.05 MIU/L-ACNC: 0.06 UIU/ML (ref 0.27–4.2)
WBC NRBC COR # BLD: 7.52 10*3/MM3 (ref 3.4–10.8)

## 2020-06-10 PROCEDURE — 99214 OFFICE O/P EST MOD 30 MIN: CPT | Performed by: OBSTETRICS & GYNECOLOGY

## 2020-06-10 PROCEDURE — 84443 ASSAY THYROID STIM HORMONE: CPT

## 2020-06-10 PROCEDURE — 80081 OBSTETRIC PANEL INC HIV TSTG: CPT

## 2020-06-10 PROCEDURE — 83036 HEMOGLOBIN GLYCOSYLATED A1C: CPT

## 2020-06-10 PROCEDURE — 81003 URINALYSIS AUTO W/O SCOPE: CPT | Performed by: OBSTETRICS & GYNECOLOGY

## 2020-06-10 PROCEDURE — 86803 HEPATITIS C AB TEST: CPT

## 2020-06-10 PROCEDURE — 36415 COLL VENOUS BLD VENIPUNCTURE: CPT

## 2020-06-10 RX ORDER — PRENATAL VIT/IRON FUM/FOLIC AC 27MG-0.8MG
1 TABLET ORAL DAILY
Qty: 30 TABLET | Refills: 10 | Status: SHIPPED | OUTPATIENT
Start: 2020-06-10 | End: 2021-06-01

## 2020-06-10 NOTE — PROGRESS NOTES
@SUBJECTIVEBEGIN  Chief Complaint   Patient presents with   • Initial Prenatal Visit     No complaints       Salina De La Fuente is a 24 y.o. year old .  Patient's last menstrual period was 2020 (exact date)..  She presents to be seen to initiate prenatal care.  She complains of frequent urination and positive home pregnancy test. These symptoms have been occurring for approximately 9 weeks.  She states that nothing helps to alleviate her symptoms.  Pre-existing conditions that could possibly affect the pregnancy are none.  Patient is continued to breast-feed from her last delivery.  She was using Micronor for birth control but ran out and was unable to refill it due to the pandemic.  Patient is now 11 weeks pregnant and experiencing some nausea.  She does not require medication to treated.  She is having no other early pregnancy problems.    Past Medical History:   Diagnosis Date   • Asthma    • Eczema of both hands    • Gestational diabetes     previous pregnancy    • Kidney stone 2018   , History reviewed. No pertinent surgical history., Family History   Problem Relation Age of Onset   • Stroke Father    • Thyroid disease Mother    • Breast cancer Paternal Grandmother    • Cervical cancer Paternal Grandmother    • Diabetes Paternal Grandmother    • Uterine cancer Paternal Grandmother    • Ovarian cancer Neg Hx    • Colon cancer Neg Hx    , Social History     Tobacco Use   • Smoking status: Never Smoker   • Smokeless tobacco: Never Used   Substance Use Topics   • Alcohol use: No   • Drug use: No   ,   (Not in a hospital admission) and Allergies:  Corticosteroids and Prednisone    Social History    Tobacco Use      Smoking status: Never Smoker      Smokeless tobacco: Never Used      The following portions of the patient's history were reviewed and updated as appropriate:vital signs, allergies, current medications, past medical history, past social history, past surgical history and problem  list.    Objective     Imaging   Vaginal ultrasound report    Review of Systems - History obtained from the patient  General ROS: positive for  - weight gain  Psychological ROS: negative  ENT ROS: negative  Allergy and Immunology ROS: positive for - seasonal allergies  Hematological and Lymphatic ROS: negative  Endocrine ROS: negative  Breast ROS: negative for breast lumps  Respiratory ROS: no cough, shortness of breath, or wheezing  Cardiovascular ROS: no chest pain or dyspnea on exertion  Gastrointestinal ROS: positive for - nausea/vomiting  Genito-Urinary ROS: positive for - urinary frequency/urgency  Musculoskeletal ROS: negative  Neurological ROS: no TIA or stroke symptoms  Dermatological ROS: negative     Physical Exam:  See Episode    Assessment/Plan   ASSESSMENT  Salina was seen today for initial prenatal visit.    Diagnoses and all orders for this visit:    Prenatal care, subsequent pregnancy, first trimester  -     Hemoglobin A1c; Future  -     Hepatitis C Antibody; Future  -     HIV-1 / O / 2 Ag / Antibody 4th Generation; Future  -     TSH; Future  -     Urinalysis With Culture If Indicated - Urine, Clean Catch  -     Obstetric Panel; Future  -     Chlamydia trachomatis, Neisseria gonorrhoeae, Trichomonas vaginalis, PCR - Swab, Vagina; Future  -     Pap IG, Rfx HPV ASCU; Future        PLAN  1. Tests ordered today:  No orders of the defined types were placed in this encounter.    2. Medications prescribed today:  No orders of the defined types were placed in this encounter.    3. Information reviewed: exercise in pregnancy, nutrition in pregnancy, weight gain in pregnancy, work and travel restrictions during pregnancy, list of OTC medications acceptable in pregnancy and call coverage groups  4. Genetic testing reviewed: she have considered the information and are not interested in having genetic testing performed.  5. The problem list for pregnancy was initiated today    Follow up: 2 week(s)       This  note was electronically signed.    Daniel Prado MD   Yasmine 10, 2020

## 2020-06-11 LAB
BILIRUB UR QL STRIP: NEGATIVE
CLARITY UR: CLEAR
COLOR UR: YELLOW
GLUCOSE UR STRIP-MCNC: NEGATIVE MG/DL
HGB UR QL STRIP.AUTO: NEGATIVE
HOLD SPECIMEN: NORMAL
KETONES UR QL STRIP: NEGATIVE
LEUKOCYTE ESTERASE UR QL STRIP.AUTO: NEGATIVE
NITRITE UR QL STRIP: NEGATIVE
PH UR STRIP.AUTO: 7 [PH] (ref 5–8)
PROT UR QL STRIP: NEGATIVE
RPR SER QL: NORMAL
SP GR UR STRIP: 1.02 (ref 1–1.03)
UROBILINOGEN UR QL STRIP: NORMAL

## 2020-06-24 ENCOUNTER — LAB (OUTPATIENT)
Dept: LAB | Facility: HOSPITAL | Age: 25
End: 2020-06-24

## 2020-06-24 ENCOUNTER — ROUTINE PRENATAL (OUTPATIENT)
Dept: OBSTETRICS AND GYNECOLOGY | Facility: CLINIC | Age: 25
End: 2020-06-24

## 2020-06-24 VITALS — WEIGHT: 158.3 LBS | BODY MASS INDEX: 27.17 KG/M2 | SYSTOLIC BLOOD PRESSURE: 108 MMHG | DIASTOLIC BLOOD PRESSURE: 60 MMHG

## 2020-06-24 DIAGNOSIS — R79.89 LOW TSH LEVEL: ICD-10-CM

## 2020-06-24 DIAGNOSIS — Z34.81 PRENATAL CARE, SUBSEQUENT PREGNANCY, FIRST TRIMESTER: Primary | ICD-10-CM

## 2020-06-24 LAB
GLUCOSE UR STRIP-MCNC: NEGATIVE MG/DL
PROT UR STRIP-MCNC: NEGATIVE MG/DL
T4 FREE SERPL-MCNC: 1.28 NG/DL (ref 0.93–1.7)

## 2020-06-24 PROCEDURE — 36415 COLL VENOUS BLD VENIPUNCTURE: CPT | Performed by: OBSTETRICS & GYNECOLOGY

## 2020-06-24 PROCEDURE — 99212 OFFICE O/P EST SF 10 MIN: CPT | Performed by: OBSTETRICS & GYNECOLOGY

## 2020-06-24 PROCEDURE — 84439 ASSAY OF FREE THYROXINE: CPT | Performed by: OBSTETRICS & GYNECOLOGY

## 2020-06-24 RX ORDER — LORATADINE 10 MG/1
10 TABLET ORAL DAILY
COMMUNITY

## 2020-06-24 NOTE — PROGRESS NOTES
Lab Results   Component Value Date    ABORH O Rh Positive 2016       Chief Complaint   Patient presents with   • Routine Prenatal Visit     No complaints       HPI: Salina is a  currently at 13w4d who today reports the following: Nausea-  No; Contractions: No,   Vaginal bleeding- No; Heartburn- No    Today Salina has no specific complaints  See ob flowsheet for physical exam.    Review of Systems - Negative     Prenatal Assessment  Fetal Heart Rate: 159  Movement: Absent  Prenatal Vitals  BP: 108/60  Weight: 71.8 kg (158 lb 4.8 oz)  Vaginal Drainage  Draining Fluid: No  Edema  LLE Edema: None  RLE Edema: None  Facial Edema: None     Tests done today: Free T4  At the time of the next visit, she will need to have none    Impression:   Encounter Diagnoses   Name Primary?   • Prenatal care, subsequent pregnancy, first trimester Yes   • Low TSH level        Plan: Return in 4 weeks    This note was electronically signed.    Daniel Prado MD

## 2020-07-22 ENCOUNTER — ROUTINE PRENATAL (OUTPATIENT)
Dept: OBSTETRICS AND GYNECOLOGY | Facility: CLINIC | Age: 25
End: 2020-07-22

## 2020-07-22 VITALS — BODY MASS INDEX: 28.15 KG/M2 | DIASTOLIC BLOOD PRESSURE: 56 MMHG | WEIGHT: 164 LBS | SYSTOLIC BLOOD PRESSURE: 104 MMHG

## 2020-07-22 DIAGNOSIS — Z36.3 ENCOUNTER FOR ANTENATAL SCREENING FOR MALFORMATION USING ULTRASOUND: ICD-10-CM

## 2020-07-22 DIAGNOSIS — Z34.82 PRENATAL CARE, SUBSEQUENT PREGNANCY, SECOND TRIMESTER: Primary | ICD-10-CM

## 2020-07-22 LAB
GLUCOSE UR STRIP-MCNC: NEGATIVE MG/DL
PROT UR STRIP-MCNC: NEGATIVE MG/DL

## 2020-07-22 PROCEDURE — 99212 OFFICE O/P EST SF 10 MIN: CPT | Performed by: OBSTETRICS & GYNECOLOGY

## 2020-07-22 NOTE — PROGRESS NOTES
Lab Results   Component Value Date    ABORH O Rh Positive 2016       Chief Complaint   Patient presents with   • Routine Prenatal Visit     No complaints       HPI: Salina is a  currently at 17w4d who today reports the following: Nausea-  No; Contractions: YES,   Vaginal bleeding- No; Heartburn- No    Today Salina complains of irregular contractions   See ob flowsheet for physical exam.    Review of Systems - Negative except for contractions    Prenatal Assessment  Fetal Heart Rate: 147  Movement: Present  Prenatal Vitals  BP: 104/56  Weight: 74.4 kg (164 lb)  Vaginal Drainage  Draining Fluid: No  Edema  LLE Edema: None  RLE Edema: None  Facial Edema: None     Tests done today: none  At the time of the next visit, she will need to have U/S for anatomic screening - at [facility]    Impression:   Encounter Diagnoses   Name Primary?   • Prenatal care, subsequent pregnancy, second trimester Yes   • Encounter for  screening for malformation using ultrasound        Plan: Return in 2 weeks    This note was electronically signed.    Daniel Prado MD

## 2020-08-12 ENCOUNTER — ROUTINE PRENATAL (OUTPATIENT)
Dept: OBSTETRICS AND GYNECOLOGY | Facility: CLINIC | Age: 25
End: 2020-08-12

## 2020-08-12 ENCOUNTER — OFFICE VISIT (OUTPATIENT)
Dept: OBSTETRICS AND GYNECOLOGY | Facility: HOSPITAL | Age: 25
End: 2020-08-12

## 2020-08-12 ENCOUNTER — HOSPITAL ENCOUNTER (OUTPATIENT)
Dept: WOMENS IMAGING | Facility: HOSPITAL | Age: 25
Discharge: HOME | End: 2020-08-12
Admitting: OBSTETRICS & GYNECOLOGY

## 2020-08-12 VITALS — DIASTOLIC BLOOD PRESSURE: 62 MMHG | BODY MASS INDEX: 29.29 KG/M2 | WEIGHT: 168 LBS | SYSTOLIC BLOOD PRESSURE: 102 MMHG

## 2020-08-12 VITALS
WEIGHT: 168.4 LBS | BODY MASS INDEX: 28.75 KG/M2 | DIASTOLIC BLOOD PRESSURE: 73 MMHG | SYSTOLIC BLOOD PRESSURE: 120 MMHG | HEIGHT: 64 IN

## 2020-08-12 DIAGNOSIS — Z34.90 PREGNANCY, UNSPECIFIED GESTATIONAL AGE: Primary | ICD-10-CM

## 2020-08-12 DIAGNOSIS — Z86.32 HISTORY OF GESTATIONAL DIABETES MELLITUS: ICD-10-CM

## 2020-08-12 DIAGNOSIS — O44.02 PLACENTA PREVIA IN SECOND TRIMESTER: ICD-10-CM

## 2020-08-12 DIAGNOSIS — Z34.82 PRENATAL CARE, SUBSEQUENT PREGNANCY, SECOND TRIMESTER: Primary | ICD-10-CM

## 2020-08-12 DIAGNOSIS — Z36.3 ENCOUNTER FOR ANTENATAL SCREENING FOR MALFORMATION USING ULTRASOUND: ICD-10-CM

## 2020-08-12 PROCEDURE — 76805 OB US >/= 14 WKS SNGL FETUS: CPT | Performed by: OBSTETRICS & GYNECOLOGY

## 2020-08-12 PROCEDURE — 76805 OB US >/= 14 WKS SNGL FETUS: CPT

## 2020-08-12 PROCEDURE — 99212 OFFICE O/P EST SF 10 MIN: CPT | Performed by: OBSTETRICS & GYNECOLOGY

## 2020-08-12 NOTE — PROGRESS NOTES
Documentation of the ultasound findings, images, and interpretations will be available in the patient's Viewpoint report located in the Chart Review Imaging tab in [facility].

## 2020-08-12 NOTE — PROGRESS NOTES
Lab Results   Component Value Date    ABORH O Rh Positive 2016       Chief Complaint   Patient presents with   • Routine Prenatal Visit     Patient was seen at [facility] this afternoon and was told she has placenta previa.       HPI: Salina is a  currently at 20w4d who today reports the following: Nausea-  No; Contractions: No,   Vaginal bleeding- No; Heartburn- No    Today Salina has no specific complaints  See ob flowsheet for physical exam.    Review of Systems - Negative     Prenatal Assessment  Fetal Heart Rate: PDC-  Movement: Present  Prenatal Vitals  BP: 102/62  Weight: 76.2 kg (168 lb)  Vaginal Drainage  Draining Fluid: No  Edema  LLE Edema: None  RLE Edema: None  Facial Edema: None     Tests done today: U/S for anatomic screening - anatomy completely seen today  At the time of the next visit, she will need to have none    Impression:   Encounter Diagnoses   Name Primary?   • Prenatal care, subsequent pregnancy, second trimester Yes   • Placenta previa in second trimester        Plan: Return in 4 weeks    This note was electronically signed.    Daniel Prado MD

## 2020-09-09 ENCOUNTER — ROUTINE PRENATAL (OUTPATIENT)
Dept: OBSTETRICS AND GYNECOLOGY | Facility: CLINIC | Age: 25
End: 2020-09-09

## 2020-09-09 VITALS — WEIGHT: 178 LBS | BODY MASS INDEX: 31.04 KG/M2 | DIASTOLIC BLOOD PRESSURE: 64 MMHG | SYSTOLIC BLOOD PRESSURE: 108 MMHG

## 2020-09-09 DIAGNOSIS — Z34.82 PRENATAL CARE, SUBSEQUENT PREGNANCY, SECOND TRIMESTER: Primary | ICD-10-CM

## 2020-09-09 PROCEDURE — 99212 OFFICE O/P EST SF 10 MIN: CPT | Performed by: OBSTETRICS & GYNECOLOGY

## 2020-09-09 NOTE — PROGRESS NOTES
Lab Results   Component Value Date    ABORH O Rh Positive 2016       Chief Complaint   Patient presents with   • Routine Prenatal Visit     No complaints       HPI: Salina is a  currently at 24w4d who today reports the following: Nausea-  No; Contractions: No,   Vaginal bleeding- No; Heartburn- YES    Today Salina complains of heartburn  See ob flowsheet for physical exam.    Review of Systems - Negative except for heartburn    Prenatal Assessment  Fetal Heart Rate: 144  Movement: Present  Prenatal Vitals  BP: 108/64  Weight: 80.7 kg (178 lb)  Vaginal Drainage  Draining Fluid: No  Edema  LLE Edema: None  RLE Edema: None  Facial Edema: None     Tests done today: none  At the time of the next visit, she will need to have GCT    Impression:   Encounter Diagnoses   Name Primary?   • Prenatal care, subsequent pregnancy, second trimester Yes       Plan: Return in 2 weeks    This note was electronically signed.    Daniel Prado MD

## 2020-09-22 ENCOUNTER — TELEPHONE (OUTPATIENT)
Dept: OBSTETRICS AND GYNECOLOGY | Facility: CLINIC | Age: 25
End: 2020-09-22

## 2020-09-22 ENCOUNTER — ROUTINE PRENATAL (OUTPATIENT)
Dept: OBSTETRICS AND GYNECOLOGY | Facility: CLINIC | Age: 25
End: 2020-09-22

## 2020-09-22 ENCOUNTER — LAB (OUTPATIENT)
Dept: LAB | Facility: HOSPITAL | Age: 25
End: 2020-09-22

## 2020-09-22 VITALS — SYSTOLIC BLOOD PRESSURE: 106 MMHG | WEIGHT: 180 LBS | BODY MASS INDEX: 31.39 KG/M2 | DIASTOLIC BLOOD PRESSURE: 68 MMHG

## 2020-09-22 DIAGNOSIS — O99.810 HYPERGLYCEMIA DURING PREGNANCY: Primary | ICD-10-CM

## 2020-09-22 DIAGNOSIS — Z34.82 PRENATAL CARE, SUBSEQUENT PREGNANCY, SECOND TRIMESTER: ICD-10-CM

## 2020-09-22 DIAGNOSIS — Z34.82 PRENATAL CARE, SUBSEQUENT PREGNANCY, SECOND TRIMESTER: Primary | ICD-10-CM

## 2020-09-22 LAB — GLUCOSE 1H P 100 G GLC PO SERPL-MCNC: 161 MG/DL (ref 65–140)

## 2020-09-22 PROCEDURE — 82950 GLUCOSE TEST: CPT

## 2020-09-22 PROCEDURE — 36415 COLL VENOUS BLD VENIPUNCTURE: CPT

## 2020-09-22 PROCEDURE — 99212 OFFICE O/P EST SF 10 MIN: CPT | Performed by: OBSTETRICS & GYNECOLOGY

## 2020-09-22 NOTE — PROGRESS NOTES
Lab Results   Component Value Date    ABORH O Rh Positive 2016       Chief Complaint   Patient presents with   • Routine Prenatal Visit     No complaints       HPI: Salina is a  currently at 26w3d who today reports the following: Nausea-  No; Contractions: YES,   Vaginal bleeding- No; Heartburn- YES    Today Salina complains of irregular contractions  and heartburn  See ob flowsheet for physical exam.    Review of Systems - Negative except for present illness     Prenatal Assessment  Fetal Heart Rate: 141  Movement: Present  Prenatal Vitals  BP: 106/68  Weight: 81.6 kg (180 lb)  Vaginal Drainage  Draining Fluid: No  Edema  LLE Edema: None  RLE Edema: None  Facial Edema: None     Tests done today: GCT  At the time of the next visit, she will need to have none    Impression:   Encounter Diagnoses   Name Primary?   • Prenatal care, subsequent pregnancy, second trimester Yes       Plan: Return in 2 weeks    This note was electronically signed.    Daniel Prado MD

## 2020-09-22 NOTE — TELEPHONE ENCOUNTER
Patient was called to discuss her 1 hour Glucola.  There was no answer but a message was left to return the call.    Daniel Prado MD

## 2020-09-23 ENCOUNTER — TELEPHONE (OUTPATIENT)
Dept: OBSTETRICS AND GYNECOLOGY | Facility: CLINIC | Age: 25
End: 2020-09-23

## 2020-09-23 NOTE — TELEPHONE ENCOUNTER
Pt returning your call from yesterday    Patient was called and informed that her 1 hour Glucola was 161.  A 3-hour GTT was ordered and the patient will do this with her next visit.    Daniel Prado MD

## 2020-10-06 ENCOUNTER — LAB (OUTPATIENT)
Dept: LAB | Facility: HOSPITAL | Age: 25
End: 2020-10-06

## 2020-10-06 ENCOUNTER — ROUTINE PRENATAL (OUTPATIENT)
Dept: OBSTETRICS AND GYNECOLOGY | Facility: CLINIC | Age: 25
End: 2020-10-06

## 2020-10-06 VITALS — SYSTOLIC BLOOD PRESSURE: 102 MMHG | BODY MASS INDEX: 31.91 KG/M2 | DIASTOLIC BLOOD PRESSURE: 60 MMHG | WEIGHT: 183 LBS

## 2020-10-06 DIAGNOSIS — Z3A.28 28 WEEKS GESTATION OF PREGNANCY: ICD-10-CM

## 2020-10-06 DIAGNOSIS — O99.810 HYPERGLYCEMIA DURING PREGNANCY: ICD-10-CM

## 2020-10-06 DIAGNOSIS — Z34.82 PRENATAL CARE, SUBSEQUENT PREGNANCY, SECOND TRIMESTER: Primary | ICD-10-CM

## 2020-10-06 LAB
GLUCOSE 1H P 100 G GLC PO SERPL-MCNC: 161 MG/DL (ref 74–180)
GLUCOSE 2H P 100 G GLC PO SERPL-MCNC: 121 MG/DL (ref 74–155)
GLUCOSE 3H P 100 G GLC PO SERPL-MCNC: 90 MG/DL (ref 74–140)
GLUCOSE P FAST SERPL-MCNC: 84 MG/DL (ref 74–106)

## 2020-10-06 PROCEDURE — 99212 OFFICE O/P EST SF 10 MIN: CPT | Performed by: OBSTETRICS & GYNECOLOGY

## 2020-10-06 PROCEDURE — 82952 GTT-ADDED SAMPLES: CPT

## 2020-10-06 PROCEDURE — 36415 COLL VENOUS BLD VENIPUNCTURE: CPT

## 2020-10-06 PROCEDURE — 82951 GLUCOSE TOLERANCE TEST (GTT): CPT

## 2020-10-06 NOTE — PROGRESS NOTES
Lab Results   Component Value Date    ABORH O Rh Positive 2016       Chief Complaint   Patient presents with   • Routine Prenatal Visit     No complaints       HPI: Salina is a  currently at 28w3d who today reports the following: Nausea-  No; Contractions: No,   Vaginal bleeding- No; Heartburn- No    Today Salina has no specific complaints  See ob flowsheet for physical exam.    Review of Systems - Negative    Prenatal Assessment  Fetal Heart Rate: 146  Movement: Present  Prenatal Vitals  BP: 102/60  Weight: 83 kg (183 lb)  Vaginal Drainage  Draining Fluid: No  Edema  LLE Edema: None  RLE Edema: None  Facial Edema: None     Tests done today: GTT normal  At the time of the next visit, she will need to have none    Impression:   Encounter Diagnoses   Name Primary?   • Prenatal care, subsequent pregnancy, second trimester Yes       Plan: Return in 2 weeks    This note was electronically signed.    Daniel Prado MD

## 2020-10-21 ENCOUNTER — ROUTINE PRENATAL (OUTPATIENT)
Dept: OBSTETRICS AND GYNECOLOGY | Facility: CLINIC | Age: 25
End: 2020-10-21

## 2020-10-21 VITALS — WEIGHT: 186.2 LBS | SYSTOLIC BLOOD PRESSURE: 108 MMHG | DIASTOLIC BLOOD PRESSURE: 62 MMHG | BODY MASS INDEX: 32.47 KG/M2

## 2020-10-21 DIAGNOSIS — Z34.83 PRENATAL CARE, SUBSEQUENT PREGNANCY, THIRD TRIMESTER: Primary | ICD-10-CM

## 2020-10-21 DIAGNOSIS — O26.893 PREGNANCY RELATED HIP PAIN IN THIRD TRIMESTER, ANTEPARTUM: ICD-10-CM

## 2020-10-21 DIAGNOSIS — M25.559 PREGNANCY RELATED HIP PAIN IN THIRD TRIMESTER, ANTEPARTUM: ICD-10-CM

## 2020-10-21 LAB
GLUCOSE UR STRIP-MCNC: NEGATIVE MG/DL
PROT UR STRIP-MCNC: NEGATIVE MG/DL

## 2020-10-21 PROCEDURE — 99212 OFFICE O/P EST SF 10 MIN: CPT | Performed by: OBSTETRICS & GYNECOLOGY

## 2020-10-21 NOTE — PROGRESS NOTES
Lab Results   Component Value Date    ABORH O Rh Positive 2016       Chief Complaint   Patient presents with   • Routine Prenatal Visit     Patient complains of right hip pain.       HPI: Salina is a  currently at 30w4d who today reports the following: Nausea-  No; Contractions: YES,   Vaginal bleeding- No; Heartburn- No    Today Salina complains of irregular contractions   See ob flowsheet for physical exam.    Review of Systems - Negative except for irregular contractions    Prenatal Assessment  Fetal Heart Rate: 150  Movement: Present  Prenatal Vitals  BP: 108/62  Weight: 84.5 kg (186 lb 3.2 oz)  Vaginal Drainage  Draining Fluid: No  Edema  LLE Edema: None  RLE Edema: None  Facial Edema: None     Tests done today: none  At the time of the next visit, she will need to have US at [facility]    Impression:   Encounter Diagnoses   Name Primary?   • Prenatal care, subsequent pregnancy, third trimester Yes   • Pregnancy related hip pain in third trimester, antepartum        Plan: Return in 2 weeks    This note was electronically signed.    Daniel Prado MD

## 2020-11-04 ENCOUNTER — HOSPITAL ENCOUNTER (OUTPATIENT)
Dept: WOMENS IMAGING | Facility: HOSPITAL | Age: 25
Discharge: HOME | End: 2020-11-04
Admitting: OBSTETRICS & GYNECOLOGY

## 2020-11-04 ENCOUNTER — ROUTINE PRENATAL (OUTPATIENT)
Dept: OBSTETRICS AND GYNECOLOGY | Facility: CLINIC | Age: 25
End: 2020-11-04

## 2020-11-04 ENCOUNTER — OFFICE VISIT (OUTPATIENT)
Dept: OBSTETRICS AND GYNECOLOGY | Facility: HOSPITAL | Age: 25
End: 2020-11-04

## 2020-11-04 VITALS — BODY MASS INDEX: 32.95 KG/M2 | SYSTOLIC BLOOD PRESSURE: 120 MMHG | DIASTOLIC BLOOD PRESSURE: 72 MMHG | WEIGHT: 189 LBS

## 2020-11-04 VITALS — WEIGHT: 188.8 LBS | DIASTOLIC BLOOD PRESSURE: 62 MMHG | SYSTOLIC BLOOD PRESSURE: 110 MMHG | BODY MASS INDEX: 32.92 KG/M2

## 2020-11-04 DIAGNOSIS — Z86.32 HISTORY OF GESTATIONAL DIABETES MELLITUS: ICD-10-CM

## 2020-11-04 DIAGNOSIS — O44.02 PLACENTA PREVIA IN SECOND TRIMESTER: Primary | ICD-10-CM

## 2020-11-04 DIAGNOSIS — Z34.83 PRENATAL CARE, SUBSEQUENT PREGNANCY, THIRD TRIMESTER: Primary | ICD-10-CM

## 2020-11-04 DIAGNOSIS — Z34.90 PREGNANCY, UNSPECIFIED GESTATIONAL AGE: ICD-10-CM

## 2020-11-04 DIAGNOSIS — O44.02 PLACENTA PREVIA IN SECOND TRIMESTER: ICD-10-CM

## 2020-11-04 LAB
GLUCOSE UR STRIP-MCNC: NEGATIVE MG/DL
PROT UR STRIP-MCNC: NEGATIVE MG/DL

## 2020-11-04 PROCEDURE — 76819 FETAL BIOPHYS PROFIL W/O NST: CPT

## 2020-11-04 PROCEDURE — 76819 FETAL BIOPHYS PROFIL W/O NST: CPT | Performed by: OBSTETRICS & GYNECOLOGY

## 2020-11-04 PROCEDURE — 76816 OB US FOLLOW-UP PER FETUS: CPT

## 2020-11-04 PROCEDURE — 76816 OB US FOLLOW-UP PER FETUS: CPT | Performed by: OBSTETRICS & GYNECOLOGY

## 2020-11-04 PROCEDURE — 99212 OFFICE O/P EST SF 10 MIN: CPT | Performed by: OBSTETRICS & GYNECOLOGY

## 2020-11-04 NOTE — PROGRESS NOTES
Pt denies all s/s PTL, denies other problems.  To see OB today.  Denies having any genetic screening tests.

## 2020-11-04 NOTE — PROGRESS NOTES
Patient seen in Maternal Fetal Medicine clinic today. Please see full note in under imaging tab of patient chart in Epic (Viewpoint report).    Margarita Thomas MD

## 2020-11-04 NOTE — PROGRESS NOTES
Lab Results   Component Value Date    ABORH O Rh Positive 2016       Chief Complaint   Patient presents with   • Routine Prenatal Visit     No complaints   • Pregnancy Ultrasound     Patient was seen over at [facility] this afternoon.       HPI: Salina is a  currently at 32w4d who today reports the following: Nausea-  No; Contractions: No,   Vaginal bleeding- No; Heartburn- No    Today Salina has no specific complaints  See ob flowsheet for physical exam.    Review of Systems - Negative     Prenatal Assessment  Fetal Heart Rate: PDC-145  Movement: Present  Prenatal Vitals  BP: 110/62  Weight: 85.6 kg (188 lb 12.8 oz)  Urine Glucose/Protein  Urine Glucose Read-only: Negative  Urine Protein Read-only: Negative  Vaginal Drainage  Draining Fluid: No  Edema  LLE Edema: None  RLE Edema: None  Facial Edema: None     Tests done today: U/S for EFW - at [facility]  At the time of the next visit, she will need to have none    Impression:   Encounter Diagnoses   Name Primary?   • Prenatal care, subsequent pregnancy, third trimester Yes       Plan: Return in 2 weeks    This note was electronically signed.    Daniel Prado MD

## 2020-11-18 ENCOUNTER — ROUTINE PRENATAL (OUTPATIENT)
Dept: OBSTETRICS AND GYNECOLOGY | Facility: CLINIC | Age: 25
End: 2020-11-18

## 2020-11-18 VITALS — BODY MASS INDEX: 33.41 KG/M2 | WEIGHT: 191.6 LBS | SYSTOLIC BLOOD PRESSURE: 116 MMHG | DIASTOLIC BLOOD PRESSURE: 74 MMHG

## 2020-11-18 DIAGNOSIS — R00.2 HEART PALPITATIONS: ICD-10-CM

## 2020-11-18 DIAGNOSIS — Z34.83 PRENATAL CARE, SUBSEQUENT PREGNANCY, THIRD TRIMESTER: Primary | ICD-10-CM

## 2020-11-18 PROCEDURE — 99212 OFFICE O/P EST SF 10 MIN: CPT | Performed by: OBSTETRICS & GYNECOLOGY

## 2020-11-18 NOTE — PROGRESS NOTES
Lab Results   Component Value Date    ABORH O Rh Positive 2016       Chief Complaint   Patient presents with   • Routine Prenatal Visit     Patient states she has been having episodes of heart palpitations and SOB mostly when she is lying down.        HPI: Salina is a  currently at 34w4d who today reports the following: Nausea-  No; Contractions: YES,   Vaginal bleeding- No; Heartburn- No, patient is complaining of shortness of breath and irregular heart rate, she reports this usually lasts less than a minute, the longest episode lasted about 5 minutes.    Today Salina has no specific complaints  See ob flowsheet for physical exam.    Review of Systems - Negative except occasional contraction    Prenatal Assessment  Fetal Heart Rate: 141  Fundal Height (cm): 37 cm  Movement: Present  Presentation: Vertex  Prenatal Vitals  BP: 116/74  Weight: 86.9 kg (191 lb 9.6 oz)  Vaginal Drainage  Draining Fluid: No  Edema  LLE Edema: None  RLE Edema: None  Facial Edema: None     Tests done today: none  At the time of the next visit, she will need to have GBS testing    Impression:   Encounter Diagnoses   Name Primary?   • Prenatal care, subsequent pregnancy, third trimester Yes   • Heart palpitations        Plan: Return in 2 weeks patient was encouraged to go to labor pedraza if the arrhythmia last for any length of time    This note was electronically signed.    Daniel Prado MD

## 2020-12-02 ENCOUNTER — ROUTINE PRENATAL (OUTPATIENT)
Dept: OBSTETRICS AND GYNECOLOGY | Facility: CLINIC | Age: 25
End: 2020-12-02

## 2020-12-02 ENCOUNTER — RESULTS ENCOUNTER (OUTPATIENT)
Dept: OBSTETRICS AND GYNECOLOGY | Facility: CLINIC | Age: 25
End: 2020-12-02

## 2020-12-02 VITALS — BODY MASS INDEX: 34.21 KG/M2 | WEIGHT: 196.2 LBS | SYSTOLIC BLOOD PRESSURE: 108 MMHG | DIASTOLIC BLOOD PRESSURE: 60 MMHG

## 2020-12-02 DIAGNOSIS — Z36.85 ANTENATAL SCREENING FOR STREPTOCOCCUS B: ICD-10-CM

## 2020-12-02 DIAGNOSIS — Z34.83 PRENATAL CARE, SUBSEQUENT PREGNANCY, THIRD TRIMESTER: Primary | ICD-10-CM

## 2020-12-02 LAB — GP B STREP RRNA SPEC QL PROBE: NEGATIVE

## 2020-12-02 PROCEDURE — 99212 OFFICE O/P EST SF 10 MIN: CPT | Performed by: OBSTETRICS & GYNECOLOGY

## 2020-12-02 NOTE — PROGRESS NOTES
Lab Results   Component Value Date    ABORH O Rh Positive 2016       Chief Complaint   Patient presents with   • Routine Prenatal Visit     No complaints       HPI: Salina is a  currently at 36w4d who today reports the following: Nausea-  No; Contractions: No,   Vaginal bleeding- No; Heartburn- No    Today Salina complains of irregular contractions   See ob flowsheet for physical exam.    Review of Systems - Negative except very irregular contractions    Prenatal Assessment  Fetal Heart Rate: 144  Movement: Present  Presentation: Vertex  Prenatal Vitals  BP: 108/60  Weight: 89 kg (196 lb 3.2 oz)  Dilation/Effacement/Station  Dilation: Fingertip  Effacement (%): 30  Station: -3  Vaginal Drainage  Draining Fluid: No  Edema  LLE Edema: None  RLE Edema: None  Facial Edema: None     Tests done today: GBS testing  At the time of the next visit, she will need to have none    Impression:   Encounter Diagnoses   Name Primary?   • Prenatal care, subsequent pregnancy, third trimester Yes   •  screening for streptococcus B        Plan: Return in 1 week    This note was electronically signed.    Daniel Prado MD

## 2020-12-09 ENCOUNTER — ROUTINE PRENATAL (OUTPATIENT)
Dept: OBSTETRICS AND GYNECOLOGY | Facility: CLINIC | Age: 25
End: 2020-12-09

## 2020-12-09 VITALS — SYSTOLIC BLOOD PRESSURE: 108 MMHG | BODY MASS INDEX: 34.56 KG/M2 | DIASTOLIC BLOOD PRESSURE: 64 MMHG | WEIGHT: 198.2 LBS

## 2020-12-09 DIAGNOSIS — Z34.83 PRENATAL CARE, SUBSEQUENT PREGNANCY, THIRD TRIMESTER: Primary | ICD-10-CM

## 2020-12-09 LAB
GLUCOSE UR STRIP-MCNC: NEGATIVE MG/DL
PROT UR STRIP-MCNC: NEGATIVE MG/DL

## 2020-12-09 PROCEDURE — 99212 OFFICE O/P EST SF 10 MIN: CPT | Performed by: OBSTETRICS & GYNECOLOGY

## 2020-12-09 NOTE — PROGRESS NOTES
Lab Results   Component Value Date    ABORH O Rh Positive 2016       Chief Complaint   Patient presents with   • Routine Prenatal Visit     No complaints       HPI: Salina is a  currently at 37w4d who today reports the following: Nausea-  No; Contractions: No,   Vaginal bleeding- No; Heartburn- No    Today Salina has no specific complaints  See ob flowsheet for physical exam.    Review of Systems - Negative     Prenatal Assessment  Fetal Heart Rate: 138  Movement: Present  Presentation: Vertex  Prenatal Vitals  BP: 108/64  Weight: 89.9 kg (198 lb 3.2 oz)  Urine Glucose/Protein  Urine Glucose Read-only: Negative  Urine Protein Read-only: Negative  Dilation/Effacement/Station  Dilation: Fingertip  Effacement (%): 30  Station: -3  Vaginal Drainage  Draining Fluid: No  Edema  LLE Edema: None  RLE Edema: None  Facial Edema: None     Tests done today: none  At the time of the next visit, she will need to have none    Impression:   Encounter Diagnoses   Name Primary?   • Prenatal care, subsequent pregnancy, third trimester Yes       Plan: Return in 1 week    This note was electronically signed.    Daniel Prado MD

## 2020-12-16 ENCOUNTER — ROUTINE PRENATAL (OUTPATIENT)
Dept: OBSTETRICS AND GYNECOLOGY | Facility: CLINIC | Age: 25
End: 2020-12-16

## 2020-12-16 VITALS — WEIGHT: 197.2 LBS | DIASTOLIC BLOOD PRESSURE: 70 MMHG | SYSTOLIC BLOOD PRESSURE: 110 MMHG | BODY MASS INDEX: 34.38 KG/M2

## 2020-12-16 DIAGNOSIS — R00.2 HEART PALPITATIONS: ICD-10-CM

## 2020-12-16 DIAGNOSIS — Z34.83 PRENATAL CARE, SUBSEQUENT PREGNANCY, THIRD TRIMESTER: Primary | ICD-10-CM

## 2020-12-16 PROCEDURE — 99212 OFFICE O/P EST SF 10 MIN: CPT | Performed by: OBSTETRICS & GYNECOLOGY

## 2020-12-16 NOTE — PROGRESS NOTES
Lab Results   Component Value Date    ABORH O Rh Positive 2016       Chief Complaint   Patient presents with   • Routine Prenatal Visit     No complaints       HPI: Salina is a  currently at 38w4d who today reports the following: Nausea-  No; Contractions: No,   Vaginal bleeding- No; Heartburn- No    Today Salina has no specific complaints  See ob flowsheet for physical exam.    Review of Systems - Negative     Prenatal Assessment  Fetal Heart Rate: 153  Movement: Present  Presentation: Vertex  Prenatal Vitals  BP: 110/70  Weight: 89.4 kg (197 lb 3.2 oz)  Dilation/Effacement/Station  Dilation: 1  Effacement (%): 50  Station: -3  Vaginal Drainage  Draining Fluid: No  Edema  LLE Edema: None  RLE Edema: None  Facial Edema: None     Tests done today: none  At the time of the next visit, she will need to have none    Impression:   Encounter Diagnoses   Name Primary?   • Prenatal care, subsequent pregnancy, third trimester Yes   • Heart palpitations        Plan: Induction at 5 AM for induction    This note was electronically signed.    Daniel Prado MD

## 2020-12-18 ENCOUNTER — APPOINTMENT (OUTPATIENT)
Dept: PREADMISSION TESTING | Facility: HOSPITAL | Age: 25
End: 2020-12-18

## 2020-12-18 PROCEDURE — C9803 HOPD COVID-19 SPEC COLLECT: HCPCS

## 2020-12-18 PROCEDURE — U0004 COV-19 TEST NON-CDC HGH THRU: HCPCS

## 2020-12-19 LAB — SARS-COV-2 RNA RESP QL NAA+PROBE: NOT DETECTED

## 2020-12-21 ENCOUNTER — HOSPITAL ENCOUNTER (INPATIENT)
Facility: HOSPITAL | Age: 25
LOS: 2 days | Discharge: HOME | End: 2020-12-23
Attending: OBSTETRICS & GYNECOLOGY | Admitting: OBSTETRICS & GYNECOLOGY

## 2020-12-21 ENCOUNTER — HOSPITAL ENCOUNTER (OUTPATIENT)
Dept: LABOR AND DELIVERY | Facility: HOSPITAL | Age: 25
Discharge: HOME | End: 2020-12-21

## 2020-12-21 PROBLEM — Z37.9 NORMAL LABOR: Status: ACTIVE | Noted: 2020-12-21

## 2020-12-21 PROBLEM — Z3A.39 PREGNANCY WITH 39 COMPLETED WEEKS GESTATION: Status: ACTIVE | Noted: 2020-12-21

## 2020-12-21 LAB
ABO GROUP BLD: NORMAL
ALP SERPL-CCNC: 102 U/L (ref 39–117)
ALT SERPL W P-5'-P-CCNC: 9 U/L (ref 1–33)
AST SERPL-CCNC: 18 U/L (ref 1–32)
BILIRUB SERPL-MCNC: 0.2 MG/DL (ref 0–1.2)
BLD GP AB SCN SERPL QL: NEGATIVE
CREAT SERPL-MCNC: 0.49 MG/DL (ref 0.57–1)
DEPRECATED RDW RBC AUTO: 44.6 FL (ref 37–54)
ERYTHROCYTE [DISTWIDTH] IN BLOOD BY AUTOMATED COUNT: 13.6 % (ref 12.3–15.4)
HCT VFR BLD AUTO: 39.6 % (ref 34–46.6)
HGB BLD-MCNC: 12.6 G/DL (ref 12–15.9)
LDH SERPL-CCNC: 161 U/L (ref 135–214)
MCH RBC QN AUTO: 28.3 PG (ref 26.6–33)
MCHC RBC AUTO-ENTMCNC: 31.8 G/DL (ref 31.5–35.7)
MCV RBC AUTO: 89 FL (ref 79–97)
PLATELET # BLD AUTO: 129 10*3/MM3 (ref 140–450)
PMV BLD AUTO: 12.5 FL (ref 6–12)
RBC # BLD AUTO: 4.45 10*6/MM3 (ref 3.77–5.28)
RH BLD: POSITIVE
T&S EXPIRATION DATE: NORMAL
URATE SERPL-MCNC: 3 MG/DL (ref 2.4–5.7)
WBC # BLD AUTO: 7.69 10*3/MM3 (ref 3.4–10.8)

## 2020-12-21 PROCEDURE — 59025 FETAL NON-STRESS TEST: CPT

## 2020-12-21 PROCEDURE — 84075 ASSAY ALKALINE PHOSPHATASE: CPT | Performed by: OBSTETRICS & GYNECOLOGY

## 2020-12-21 PROCEDURE — 84460 ALANINE AMINO (ALT) (SGPT): CPT | Performed by: OBSTETRICS & GYNECOLOGY

## 2020-12-21 PROCEDURE — 84450 TRANSFERASE (AST) (SGOT): CPT | Performed by: OBSTETRICS & GYNECOLOGY

## 2020-12-21 PROCEDURE — 82565 ASSAY OF CREATININE: CPT | Performed by: OBSTETRICS & GYNECOLOGY

## 2020-12-21 PROCEDURE — 84550 ASSAY OF BLOOD/URIC ACID: CPT | Performed by: OBSTETRICS & GYNECOLOGY

## 2020-12-21 PROCEDURE — 86900 BLOOD TYPING SEROLOGIC ABO: CPT | Performed by: OBSTETRICS & GYNECOLOGY

## 2020-12-21 PROCEDURE — 82247 BILIRUBIN TOTAL: CPT | Performed by: OBSTETRICS & GYNECOLOGY

## 2020-12-21 PROCEDURE — 83615 LACTATE (LD) (LDH) ENZYME: CPT | Performed by: OBSTETRICS & GYNECOLOGY

## 2020-12-21 PROCEDURE — 36415 COLL VENOUS BLD VENIPUNCTURE: CPT | Performed by: OBSTETRICS & GYNECOLOGY

## 2020-12-21 PROCEDURE — 86850 RBC ANTIBODY SCREEN: CPT | Performed by: OBSTETRICS & GYNECOLOGY

## 2020-12-21 PROCEDURE — 59410 OBSTETRICAL CARE: CPT | Performed by: OBSTETRICS & GYNECOLOGY

## 2020-12-21 PROCEDURE — 86901 BLOOD TYPING SEROLOGIC RH(D): CPT | Performed by: OBSTETRICS & GYNECOLOGY

## 2020-12-21 PROCEDURE — 85027 COMPLETE CBC AUTOMATED: CPT | Performed by: OBSTETRICS & GYNECOLOGY

## 2020-12-21 PROCEDURE — 6A550ZT PHERESIS OF CORD BLOOD STEM CELLS, SINGLE: ICD-10-PCS | Performed by: OBSTETRICS & GYNECOLOGY

## 2020-12-21 PROCEDURE — S0260 H&P FOR SURGERY: HCPCS | Performed by: OBSTETRICS & GYNECOLOGY

## 2020-12-21 RX ORDER — HYDROCODONE BITARTRATE AND ACETAMINOPHEN 5; 325 MG/1; MG/1
1 TABLET ORAL EVERY 4 HOURS PRN
Status: DISCONTINUED | OUTPATIENT
Start: 2020-12-21 | End: 2020-12-23 | Stop reason: HOSPADM

## 2020-12-21 RX ORDER — IBUPROFEN 600 MG/1
600 TABLET ORAL EVERY 6 HOURS PRN
Status: DISCONTINUED | OUTPATIENT
Start: 2020-12-21 | End: 2020-12-23 | Stop reason: HOSPADM

## 2020-12-21 RX ORDER — MAGNESIUM CARB/ALUMINUM HYDROX 105-160MG
30 TABLET,CHEWABLE ORAL ONCE
Status: DISCONTINUED | OUTPATIENT
Start: 2020-12-21 | End: 2020-12-21 | Stop reason: HOSPADM

## 2020-12-21 RX ORDER — OXYCODONE HYDROCHLORIDE AND ACETAMINOPHEN 5; 325 MG/1; MG/1
1 TABLET ORAL EVERY 4 HOURS PRN
Status: DISCONTINUED | OUTPATIENT
Start: 2020-12-21 | End: 2020-12-23 | Stop reason: HOSPADM

## 2020-12-21 RX ORDER — ONDANSETRON 2 MG/ML
4 INJECTION INTRAMUSCULAR; INTRAVENOUS EVERY 6 HOURS PRN
Status: DISCONTINUED | OUTPATIENT
Start: 2020-12-21 | End: 2020-12-21 | Stop reason: HOSPADM

## 2020-12-21 RX ORDER — BISACODYL 10 MG
10 SUPPOSITORY, RECTAL RECTAL DAILY PRN
Status: DISCONTINUED | OUTPATIENT
Start: 2020-12-22 | End: 2020-12-23 | Stop reason: HOSPADM

## 2020-12-21 RX ORDER — ONDANSETRON 4 MG/1
4 TABLET, FILM COATED ORAL EVERY 6 HOURS PRN
Status: DISCONTINUED | OUTPATIENT
Start: 2020-12-21 | End: 2020-12-21 | Stop reason: HOSPADM

## 2020-12-21 RX ORDER — METHYLERGONOVINE MALEATE 0.2 MG/ML
200 INJECTION INTRAVENOUS ONCE AS NEEDED
Status: DISCONTINUED | OUTPATIENT
Start: 2020-12-21 | End: 2020-12-21 | Stop reason: HOSPADM

## 2020-12-21 RX ORDER — OXYCODONE HYDROCHLORIDE AND ACETAMINOPHEN 5; 325 MG/1; MG/1
2 TABLET ORAL EVERY 4 HOURS PRN
Status: DISCONTINUED | OUTPATIENT
Start: 2020-12-21 | End: 2020-12-21 | Stop reason: HOSPADM

## 2020-12-21 RX ORDER — LIDOCAINE HYDROCHLORIDE 10 MG/ML
5 INJECTION, SOLUTION EPIDURAL; INFILTRATION; INTRACAUDAL; PERINEURAL AS NEEDED
Status: DISCONTINUED | OUTPATIENT
Start: 2020-12-21 | End: 2020-12-21 | Stop reason: HOSPADM

## 2020-12-21 RX ORDER — SODIUM CHLORIDE, SODIUM LACTATE, POTASSIUM CHLORIDE, CALCIUM CHLORIDE 600; 310; 30; 20 MG/100ML; MG/100ML; MG/100ML; MG/100ML
125 INJECTION, SOLUTION INTRAVENOUS CONTINUOUS
Status: DISCONTINUED | OUTPATIENT
Start: 2020-12-21 | End: 2020-12-23 | Stop reason: HOSPADM

## 2020-12-21 RX ORDER — PRENATAL VIT/IRON FUM/FOLIC AC 27MG-0.8MG
1 TABLET ORAL DAILY
Status: DISCONTINUED | OUTPATIENT
Start: 2020-12-21 | End: 2020-12-23 | Stop reason: HOSPADM

## 2020-12-21 RX ORDER — DOCUSATE SODIUM 100 MG/1
100 CAPSULE, LIQUID FILLED ORAL 2 TIMES DAILY
Status: DISCONTINUED | OUTPATIENT
Start: 2020-12-21 | End: 2020-12-23 | Stop reason: HOSPADM

## 2020-12-21 RX ORDER — MISOPROSTOL 200 UG/1
800 TABLET ORAL AS NEEDED
Status: DISCONTINUED | OUTPATIENT
Start: 2020-12-21 | End: 2020-12-21 | Stop reason: HOSPADM

## 2020-12-21 RX ORDER — SODIUM CHLORIDE 0.9 % (FLUSH) 0.9 %
10 SYRINGE (ML) INJECTION AS NEEDED
Status: DISCONTINUED | OUTPATIENT
Start: 2020-12-21 | End: 2020-12-21 | Stop reason: HOSPADM

## 2020-12-21 RX ORDER — SODIUM CHLORIDE, SODIUM LACTATE, POTASSIUM CHLORIDE, CALCIUM CHLORIDE 600; 310; 30; 20 MG/100ML; MG/100ML; MG/100ML; MG/100ML
125 INJECTION, SOLUTION INTRAVENOUS CONTINUOUS
Status: DISCONTINUED | OUTPATIENT
Start: 2020-12-21 | End: 2020-12-21

## 2020-12-21 RX ORDER — CARBOPROST TROMETHAMINE 250 UG/ML
250 INJECTION, SOLUTION INTRAMUSCULAR AS NEEDED
Status: DISCONTINUED | OUTPATIENT
Start: 2020-12-21 | End: 2020-12-21 | Stop reason: HOSPADM

## 2020-12-21 RX ORDER — HYDROXYZINE HYDROCHLORIDE 25 MG/1
50 TABLET, FILM COATED ORAL NIGHTLY PRN
Status: DISCONTINUED | OUTPATIENT
Start: 2020-12-21 | End: 2020-12-23 | Stop reason: HOSPADM

## 2020-12-21 RX ORDER — OXYTOCIN-SODIUM CHLORIDE 0.9% IV SOLN 30 UNIT/500ML 30-0.9/5 UT/ML-%
650 SOLUTION INTRAVENOUS ONCE
Status: COMPLETED | OUTPATIENT
Start: 2020-12-21 | End: 2020-12-21

## 2020-12-21 RX ORDER — SODIUM CHLORIDE 0.9 % (FLUSH) 0.9 %
3 SYRINGE (ML) INJECTION EVERY 12 HOURS SCHEDULED
Status: DISCONTINUED | OUTPATIENT
Start: 2020-12-21 | End: 2020-12-21 | Stop reason: HOSPADM

## 2020-12-21 RX ORDER — PROMETHAZINE HYDROCHLORIDE 25 MG/1
25 TABLET ORAL EVERY 6 HOURS PRN
Status: DISCONTINUED | OUTPATIENT
Start: 2020-12-21 | End: 2020-12-23 | Stop reason: HOSPADM

## 2020-12-21 RX ORDER — OXYTOCIN-SODIUM CHLORIDE 0.9% IV SOLN 30 UNIT/500ML 30-0.9/5 UT/ML-%
2-20 SOLUTION INTRAVENOUS
Status: DISCONTINUED | OUTPATIENT
Start: 2020-12-21 | End: 2020-12-21 | Stop reason: HOSPADM

## 2020-12-21 RX ORDER — LANOLIN
CREAM (ML) TOPICAL
Status: DISCONTINUED | OUTPATIENT
Start: 2020-12-21 | End: 2020-12-23 | Stop reason: HOSPADM

## 2020-12-21 RX ORDER — OXYTOCIN-SODIUM CHLORIDE 0.9% IV SOLN 30 UNIT/500ML 30-0.9/5 UT/ML-%
85 SOLUTION INTRAVENOUS ONCE
Status: DISCONTINUED | OUTPATIENT
Start: 2020-12-21 | End: 2020-12-21 | Stop reason: HOSPADM

## 2020-12-21 RX ADMIN — OXYTOCIN 650 ML/HR: 10 INJECTION INTRAVENOUS at 09:11

## 2020-12-21 RX ADMIN — SODIUM CHLORIDE, POTASSIUM CHLORIDE, SODIUM LACTATE AND CALCIUM CHLORIDE 125 ML/HR: 600; 310; 30; 20 INJECTION, SOLUTION INTRAVENOUS at 05:45

## 2020-12-21 RX ADMIN — WITCH HAZEL 1 PAD: 500 SOLUTION RECTAL; TOPICAL at 12:10

## 2020-12-21 RX ADMIN — PRENATAL VITAMINS-IRON FUMARATE 27 MG IRON-FOLIC ACID 0.8 MG TABLET 1 TABLET: at 12:10

## 2020-12-21 RX ADMIN — Medication: at 12:10

## 2020-12-21 RX ADMIN — IBUPROFEN 600 MG: 600 TABLET, FILM COATED ORAL at 12:10

## 2020-12-21 RX ADMIN — DOCUSATE SODIUM 100 MG: 100 CAPSULE ORAL at 12:10

## 2020-12-22 LAB
BASOPHILS # BLD AUTO: 0.03 10*3/MM3 (ref 0–0.2)
BASOPHILS NFR BLD AUTO: 0.3 % (ref 0–1.5)
DEPRECATED RDW RBC AUTO: 47.2 FL (ref 37–54)
EOSINOPHIL # BLD AUTO: 0.18 10*3/MM3 (ref 0–0.4)
EOSINOPHIL NFR BLD AUTO: 1.8 % (ref 0.3–6.2)
ERYTHROCYTE [DISTWIDTH] IN BLOOD BY AUTOMATED COUNT: 13.8 % (ref 12.3–15.4)
HCT VFR BLD AUTO: 36.6 % (ref 34–46.6)
HGB BLD-MCNC: 11.6 G/DL (ref 12–15.9)
IMM GRANULOCYTES # BLD AUTO: 0.08 10*3/MM3 (ref 0–0.05)
IMM GRANULOCYTES NFR BLD AUTO: 0.8 % (ref 0–0.5)
LYMPHOCYTES # BLD AUTO: 2.5 10*3/MM3 (ref 0.7–3.1)
LYMPHOCYTES NFR BLD AUTO: 25.4 % (ref 19.6–45.3)
MCH RBC QN AUTO: 29.4 PG (ref 26.6–33)
MCHC RBC AUTO-ENTMCNC: 31.7 G/DL (ref 31.5–35.7)
MCV RBC AUTO: 92.9 FL (ref 79–97)
MONOCYTES # BLD AUTO: 0.85 10*3/MM3 (ref 0.1–0.9)
MONOCYTES NFR BLD AUTO: 8.6 % (ref 5–12)
NEUTROPHILS NFR BLD AUTO: 6.19 10*3/MM3 (ref 1.7–7)
NEUTROPHILS NFR BLD AUTO: 63.1 % (ref 42.7–76)
NRBC BLD AUTO-RTO: 0 /100 WBC (ref 0–0.2)
PLATELET # BLD AUTO: 120 10*3/MM3 (ref 140–450)
PMV BLD AUTO: 12.8 FL (ref 6–12)
RBC # BLD AUTO: 3.94 10*6/MM3 (ref 3.77–5.28)
WBC # BLD AUTO: 9.83 10*3/MM3 (ref 3.4–10.8)

## 2020-12-22 PROCEDURE — 0503F POSTPARTUM CARE VISIT: CPT | Performed by: OBSTETRICS & GYNECOLOGY

## 2020-12-22 PROCEDURE — 85025 COMPLETE CBC W/AUTO DIFF WBC: CPT | Performed by: OBSTETRICS & GYNECOLOGY

## 2020-12-22 RX ADMIN — DOCUSATE SODIUM 100 MG: 100 CAPSULE ORAL at 08:06

## 2020-12-22 RX ADMIN — Medication: at 14:30

## 2020-12-22 RX ADMIN — PRENATAL VITAMINS-IRON FUMARATE 27 MG IRON-FOLIC ACID 0.8 MG TABLET 1 TABLET: at 08:06

## 2020-12-22 RX ADMIN — BENZOCAINE 1 APPLICATION: 5.6 OINTMENT TOPICAL at 14:30

## 2020-12-22 RX ADMIN — IBUPROFEN 600 MG: 600 TABLET, FILM COATED ORAL at 20:55

## 2020-12-23 VITALS
RESPIRATION RATE: 18 BRPM | SYSTOLIC BLOOD PRESSURE: 124 MMHG | HEIGHT: 64 IN | WEIGHT: 197 LBS | DIASTOLIC BLOOD PRESSURE: 73 MMHG | HEART RATE: 80 BPM | TEMPERATURE: 98.1 F | BODY MASS INDEX: 33.63 KG/M2

## 2020-12-23 PROCEDURE — 0503F POSTPARTUM CARE VISIT: CPT | Performed by: OBSTETRICS & GYNECOLOGY

## 2020-12-23 RX ORDER — OXYCODONE HYDROCHLORIDE AND ACETAMINOPHEN 5; 325 MG/1; MG/1
1 TABLET ORAL EVERY 4 HOURS PRN
Qty: 10 TABLET | Refills: 0 | Status: SHIPPED | OUTPATIENT
Start: 2020-12-23 | End: 2020-12-28

## 2020-12-23 RX ORDER — IBUPROFEN 600 MG/1
600 TABLET ORAL EVERY 6 HOURS PRN
Qty: 30 TABLET | Refills: 1 | Status: SHIPPED | OUTPATIENT
Start: 2020-12-23 | End: 2022-09-30

## 2020-12-23 RX ADMIN — PRENATAL VITAMINS-IRON FUMARATE 27 MG IRON-FOLIC ACID 0.8 MG TABLET 1 TABLET: at 08:31

## 2020-12-23 RX ADMIN — WITCH HAZEL 1 PAD: 500 SOLUTION RECTAL; TOPICAL at 08:39

## 2020-12-23 RX ADMIN — IBUPROFEN 600 MG: 600 TABLET, FILM COATED ORAL at 08:31

## 2020-12-23 RX ADMIN — Medication: at 08:39

## 2021-01-06 ENCOUNTER — POSTPARTUM VISIT (OUTPATIENT)
Dept: OBSTETRICS AND GYNECOLOGY | Facility: CLINIC | Age: 26
End: 2021-01-06

## 2021-01-06 VITALS
RESPIRATION RATE: 14 BRPM | HEIGHT: 64 IN | DIASTOLIC BLOOD PRESSURE: 72 MMHG | WEIGHT: 177.6 LBS | SYSTOLIC BLOOD PRESSURE: 108 MMHG | BODY MASS INDEX: 30.32 KG/M2

## 2021-01-06 DIAGNOSIS — Z30.09 COUNSELING FOR BIRTH CONTROL, ORAL CONTRACEPTIVES: ICD-10-CM

## 2021-01-06 PROCEDURE — 0503F POSTPARTUM CARE VISIT: CPT | Performed by: OBSTETRICS & GYNECOLOGY

## 2021-01-06 RX ORDER — ACETAMINOPHEN AND CODEINE PHOSPHATE 120; 12 MG/5ML; MG/5ML
1 SOLUTION ORAL DAILY
Qty: 28 TABLET | Refills: 12 | Status: SHIPPED | OUTPATIENT
Start: 2021-01-06 | End: 2022-10-28

## 2021-01-06 NOTE — PROGRESS NOTES
Subjective   Salina De La Fuente is a 25 y.o. female is here today for follow-up.    Chief Complaint   Patient presents with   • Postpartum Care     Patient desires birth control she is breast feeding.         History of Present Illness    The following portions of the patient's history were reviewed and updated as appropriate: allergies, current medications, past family history, past medical history, past social history, past surgical history and problem list.    Review of Systems - History obtained from the patient  General ROS: negative  Psychological ROS: negative  ENT ROS: negative  Allergy and Immunology ROS: positive for - seasonal allergies  Hematological and Lymphatic ROS: negative  Endocrine ROS: negative  Breast ROS: negative for breast lumps  Respiratory ROS: no cough, shortness of breath, or wheezing  Cardiovascular ROS: no chest pain or dyspnea on exertion  Gastrointestinal ROS: no abdominal pain, change in bowel habits, or black or bloody stools  Genito-Urinary ROS: no dysuria, trouble voiding, or hematuria  Musculoskeletal ROS: negative  Neurological ROS: no TIA or stroke symptoms  Dermatological ROS: negative     Objective   General:  well developed; well nourished  no acute distress   Skin:  No suspicious lesions seen   Thyroid: not examined   Breasts:  Not performed.   Abdomen: Not performed.   Heart: regular rate and rhythm, S1, S2 normal, no murmur, click, rub or gallop   Lungs: clear to auscultation   Pelvis: Not performed.         Assessment/Plan   Diagnoses and all orders for this visit:    1. Postpartum care following vaginal delivery (Primary)    2. Postpartum care and examination of lactating mother    3. Counseling for birth control, oral contraceptives  -     norethindrone (MICRONOR) 0.35 MG tablet; Take 1 tablet by mouth Daily.  Dispense: 28 tablet; Refill: 12      Return in 5 weeks    Daniel Prado MD

## 2021-02-11 ENCOUNTER — TELEPHONE (OUTPATIENT)
Dept: OBSTETRICS AND GYNECOLOGY | Facility: CLINIC | Age: 26
End: 2021-02-11

## 2021-02-11 NOTE — TELEPHONE ENCOUNTER
[facility] pt called requesting a RX for Nystatin-currently breast feeding and baby has oral thrush. Send to [facility]

## 2021-02-22 ENCOUNTER — POSTPARTUM VISIT (OUTPATIENT)
Dept: OBSTETRICS AND GYNECOLOGY | Facility: CLINIC | Age: 26
End: 2021-02-22

## 2021-02-22 VITALS
SYSTOLIC BLOOD PRESSURE: 118 MMHG | HEIGHT: 64 IN | DIASTOLIC BLOOD PRESSURE: 80 MMHG | BODY MASS INDEX: 30.08 KG/M2 | WEIGHT: 176.2 LBS | RESPIRATION RATE: 14 BRPM

## 2021-02-22 PROCEDURE — 0503F POSTPARTUM CARE VISIT: CPT | Performed by: OBSTETRICS & GYNECOLOGY

## 2021-02-22 NOTE — PROGRESS NOTES
Subjective   Salina De La Fuente is a 25 y.o. female is here today for follow-up.    Chief Complaint   Patient presents with   • Postpartum Care     Patient complains of light pink spotting after intercourse.        History of Present Illness  Patient is 9 weeks post vaginal delivery.  She is using Micronor for birth control and is still nursing.  The nursing is going well.  The patient passed some clot or tissue about a week ago and wants a pelvic exam today.  This occurred after intercourse.  Patient is having no other problems. She is having no postpartum depression.  The following portions of the patient's history were reviewed and updated as appropriate: allergies, current medications, past family history, past medical history, past social history, past surgical history and problem list.    Review of Systems - History obtained from the patient  General ROS: positive for  - weight gain  Psychological ROS: negative  ENT ROS: negative  Allergy and Immunology ROS: positive for - seasonal allergies  Hematological and Lymphatic ROS: negative  Endocrine ROS: negative  Breast ROS: negative for breast lumps  Respiratory ROS: no cough, shortness of breath, or wheezing  Cardiovascular ROS: no chest pain or dyspnea on exertion  Gastrointestinal ROS: no abdominal pain, change in bowel habits, or black or bloody stools  Genito-Urinary ROS: no dysuria, trouble voiding, or hematuria  Musculoskeletal ROS: negative  Neurological ROS: no TIA or stroke symptoms  Dermatological ROS: negative     Objective   General:  well developed; well nourished  no acute distress   Skin:  Not performed.   Thyroid: not examined   Breasts:  Not performed.   Abdomen: soft, non-tender; no masses  no umbilical or inguinal hernias are present  no hepato-splenomegaly   Heart: regular rate and rhythm, S1, S2 normal, no murmur, click, rub or gallop   Lungs: clear to auscultation   Pelvis: Clinical staff was present for exam  External genitalia:  normal  appearance of the external genitalia including Bartholin's and [place]'s glands.  :  urethral meatus normal;  Vaginal:  normal pink mucosa without prolapse or lesions.  Cervix:  normal appearance.  Uterus:  normal size, shape and consistency. retroverted;  Adnexa:  normal bimanual exam of the adnexa.  Rectal:  digital rectal exam not performed; anus visually normal appearing.         Assessment/Plan   Diagnoses and all orders for this visit:    1. Postpartum care following vaginal delivery (Primary)    2. Postpartum care and examination of lactating mother      Return in 1 year  Patient will call when she decides to stop nursing to switch to regular birth control pills    Daniel Prado MD

## 2021-05-28 DIAGNOSIS — Z34.81 PRENATAL CARE, SUBSEQUENT PREGNANCY, FIRST TRIMESTER: ICD-10-CM

## 2021-06-02 RX ORDER — PRENATAL VIT/IRON FUM/FOLIC AC 27MG-0.8MG
TABLET ORAL
Qty: 30 TABLET | Refills: 9 | Status: SHIPPED | OUTPATIENT
Start: 2021-06-02 | End: 2022-09-30

## 2021-07-21 ENCOUNTER — TELEPHONE (OUTPATIENT)
Dept: OBSTETRICS AND GYNECOLOGY | Facility: CLINIC | Age: 26
End: 2021-07-21

## 2021-07-21 NOTE — TELEPHONE ENCOUNTER
[phone] returned patient's call left message for patient to return my call tomorrow 7/22/2021.

## 2021-07-21 NOTE — TELEPHONE ENCOUNTER
DR MARIN PATIENT CALLED IN TO REQUESTED NYSTATIN CREAM TO BE CALLED INTO HER PHARMACY. SHE USES THE [facility] IN [place], [place]

## 2022-01-26 ENCOUNTER — OFFICE VISIT (OUTPATIENT)
Dept: OBSTETRICS AND GYNECOLOGY | Facility: CLINIC | Age: 27
End: 2022-01-26

## 2022-01-26 VITALS
DIASTOLIC BLOOD PRESSURE: 90 MMHG | SYSTOLIC BLOOD PRESSURE: 118 MMHG | BODY MASS INDEX: 31.31 KG/M2 | RESPIRATION RATE: 14 BRPM | HEIGHT: 64 IN | WEIGHT: 183.4 LBS

## 2022-01-26 DIAGNOSIS — Z72.51 UNPROTECTED SEXUAL INTERCOURSE: Primary | ICD-10-CM

## 2022-01-26 DIAGNOSIS — Z30.09 UNWANTED FERTILITY: ICD-10-CM

## 2022-01-26 LAB
B-HCG UR QL: NEGATIVE
EXPIRATION DATE: NORMAL
INTERNAL NEGATIVE CONTROL: NEGATIVE
INTERNAL POSITIVE CONTROL: POSITIVE
Lab: NORMAL

## 2022-01-26 PROCEDURE — 11981 INSERTION DRUG DLVR IMPLANT: CPT | Performed by: STUDENT IN AN ORGANIZED HEALTH CARE EDUCATION/TRAINING PROGRAM

## 2022-01-26 PROCEDURE — 81025 URINE PREGNANCY TEST: CPT | Performed by: STUDENT IN AN ORGANIZED HEALTH CARE EDUCATION/TRAINING PROGRAM

## 2022-04-22 ENCOUNTER — OFFICE VISIT (OUTPATIENT)
Dept: OBSTETRICS AND GYNECOLOGY | Facility: CLINIC | Age: 27
End: 2022-04-22

## 2022-04-22 VITALS
HEIGHT: 64 IN | BODY MASS INDEX: 31.6 KG/M2 | WEIGHT: 185.1 LBS | DIASTOLIC BLOOD PRESSURE: 74 MMHG | SYSTOLIC BLOOD PRESSURE: 118 MMHG | RESPIRATION RATE: 14 BRPM

## 2022-04-22 DIAGNOSIS — Z97.5 NEXPLANON IN PLACE: ICD-10-CM

## 2022-04-22 DIAGNOSIS — Z01.419 WOMEN'S ANNUAL ROUTINE GYNECOLOGICAL EXAMINATION: Primary | ICD-10-CM

## 2022-04-22 PROCEDURE — 2014F MENTAL STATUS ASSESS: CPT | Performed by: STUDENT IN AN ORGANIZED HEALTH CARE EDUCATION/TRAINING PROGRAM

## 2022-04-22 PROCEDURE — 3008F BODY MASS INDEX DOCD: CPT | Performed by: STUDENT IN AN ORGANIZED HEALTH CARE EDUCATION/TRAINING PROGRAM

## 2022-04-22 PROCEDURE — 99395 PREV VISIT EST AGE 18-39: CPT | Performed by: STUDENT IN AN ORGANIZED HEALTH CARE EDUCATION/TRAINING PROGRAM

## 2022-04-22 RX ORDER — ETONOGESTREL 68 MG/1
1 IMPLANT SUBCUTANEOUS ONCE
COMMUNITY

## 2022-04-22 NOTE — PROGRESS NOTES
"Subjective   Chief Complaint   Patient presents with   • Annual Exam     Salina De La Fuente is a 26 y.o. year old  presenting to be seen for her annual exam.  She is doing well today. She has had light spotting for the last 3 weeks with the Nexplanon in place. She is not having cramping or pain, and denies heavy bleeding.     Last pap smear: NILM (6/10/2020)     SEXUAL Hx:  She is currently sexually active.  In the past year there there has been NO new sexual partners.    She would not like to be screened for STD's at today's exam.  Current birth control method: Nexplanon, placed 22   She is happy with her current method of contraception and does not want to discuss alternative methods of contraception.  MENSTRUAL Hx:  No LMP recorded. Patient has had an implant.  In the past 6 months her cycles have been irregular.  Her menstrual flow is typically light.   Each month on average there are roughly 0 day(s) of very heavy flow.  Intermenstrual bleeding is present.    Post-coital bleeding is absent.  Dysmenorrhea: mild  PMS: none  Her cycles are not a source of concern for her that she wishes to discuss today.  HEALTH Hx:  She exercises regularly: yes.  She wears her seat belt: yes.  She has concerns about domestic violence: no.  OTHER THINGS SHE WANTS TO DISCUSS TODAY:  Nothing else    The following portions of the patient's history were reviewed and updated as appropriate:problem list, current medications, allergies, past family history, past medical history, past social history and past surgical history.    Social History    Tobacco Use      Smoking status: Never Smoker      Smokeless tobacco: Never Used         Objective   /74 (BP Location: Right arm, Patient Position: Sitting, Cuff Size: Adult)   Resp 14   Ht 162.6 cm (64\")   Wt 84 kg (185 lb 1.6 oz)   Breastfeeding Yes   BMI 31.77 kg/m²     General:  well developed; well nourished  no acute distress   Skin:  No suspicious lesions seen   Breasts:  " Examined in supine position  Symmetric without masses or skin dimpling  Nipples normal without inversion, lesions or discharge  There are no palpable axillary nodes  Fibrocystic changes are present both breasts without a discrete mass   Pelvis: Clinical staff was present for exam  External genitalia:  normal appearance of the external genitalia including Bartholin's and Yantis's glands.  :  urethral meatus normal;  Vaginal:  normal pink mucosa without prolapse or lesions.  Cervix:  normal appearance.  Uterus:  normal size, shape and consistency.  Adnexa:  normal bimanual exam of the adnexa.        Assessment   1. Normal GYN exam  2. Nexplanon in place      Plan   1. Pap was not done today.  I explained to Salina that the recommendations for Pap smear interval in a low risk patient has lengthened to 3 years time.  I told Salina she still needs to be seen in our office yearly for a full physical including breast and pelvic exam.  2. I discussed with Salina that she may be behind on needed vaccinations for HPV ([id number]).  She may be able to obtain these vaccinations at her local pharmacy OR speak about obtaining them with her primary care.  If she does obtain her vaccines, I have asked Salina to let us know the date each vaccine was obtained so that her medical record could be updated in our system.  3. The importance of keeping all planned follow-up and taking all medications as prescribed was emphasized.  4. Follow up for annual exam in 1 year    No orders of the defined types were placed in this encounter.         This note was electronically signed.    Keily Anglin MD   April 22, 2022

## 2022-09-30 ENCOUNTER — OFFICE VISIT (OUTPATIENT)
Dept: FAMILY MEDICINE CLINIC | Facility: CLINIC | Age: 27
End: 2022-09-30

## 2022-09-30 VITALS
SYSTOLIC BLOOD PRESSURE: 142 MMHG | HEIGHT: 68 IN | DIASTOLIC BLOOD PRESSURE: 88 MMHG | WEIGHT: 188 LBS | HEART RATE: 93 BPM | BODY MASS INDEX: 28.49 KG/M2 | OXYGEN SATURATION: 98 %

## 2022-09-30 DIAGNOSIS — R00.0 TACHYCARDIA: ICD-10-CM

## 2022-09-30 DIAGNOSIS — F41.1 GENERALIZED ANXIETY DISORDER: Primary | ICD-10-CM

## 2022-09-30 DIAGNOSIS — E04.9 ENLARGED THYROID: ICD-10-CM

## 2022-09-30 PROCEDURE — 36415 COLL VENOUS BLD VENIPUNCTURE: CPT | Performed by: NURSE PRACTITIONER

## 2022-09-30 PROCEDURE — 99204 OFFICE O/P NEW MOD 45 MIN: CPT | Performed by: NURSE PRACTITIONER

## 2022-09-30 RX ORDER — PAROXETINE 10 MG/1
10 TABLET, FILM COATED ORAL EVERY MORNING
Qty: 30 TABLET | Refills: 1 | Status: SHIPPED | OUTPATIENT
Start: 2022-09-30 | End: 2022-10-28

## 2022-09-30 RX ORDER — HYDROXYZINE PAMOATE 25 MG/1
CAPSULE ORAL
COMMUNITY
Start: 2022-09-26 | End: 2022-09-30

## 2022-09-30 RX ORDER — HYDROXYZINE HYDROCHLORIDE 25 MG/1
25 TABLET, FILM COATED ORAL EVERY 8 HOURS PRN
Qty: 30 TABLET | Refills: 1 | Status: SHIPPED | OUTPATIENT
Start: 2022-09-30 | End: 2022-10-28 | Stop reason: SDUPTHER

## 2022-09-30 NOTE — PROGRESS NOTES
"Chief Complaint  Anxiety (ER twice in [place]. 3rd had UTI sx, chest pain and SOA went to [facility], and was told she has a really back UTI, no vital signs taken. Prescribed abx, at 10 days sx still not better. Went back to [facility] urine had WBC's, told not UTI but infection. Sx worse so went to ER. Urine was clear. EKG was normal. Dx:anxiety and prescribed hydroxyzine. Made appt for today but sx weren't any better, running fever, worried about sepsis with UTI went back to ER. Told her again it was just anxiety.)    Subjective          Salina De La Fuente presents to [facility]  History of Present Illness  Pt has had anxiety since childhood which has worsened in the past few months. She has been to the ER and [facility] several times with urinary symptoms. She was given hydroxyzine to use PRN. She has had difficulty sleeping and tachycardia. 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       Objective   Vital Signs:   /88   Pulse 93   Ht 172.7 cm (68\")   Wt 85.3 kg (188 lb)   SpO2 98%   BMI 28.59 kg/m²     Body mass index is 28.59 kg/m².    Review of Systems   Constitutional: Negative for fatigue and fever.   Respiratory: Negative for shortness of breath.    Cardiovascular: Negative for chest pain, palpitations and leg swelling.   Neurological: Negative for syncope.   Psychiatric/Behavioral: The patient is not nervous/anxious.           Current Outpatient Medications:   •  Etonogestrel (Nexplanon) 68 MG implant subdermal implant, Inject 1 each into the appropriate area of the skin as directed by provider 1 (One) Time., Disp: , Rfl:   •  loratadine (CLARITIN) 10 MG tablet, Take 10 mg by mouth Daily., Disp: , Rfl:   •  hydrOXYzine (ATARAX) 25 MG tablet, Take 1 tablet by mouth Every 8 (Eight) Hours As Needed for Anxiety., Disp: 30 tablet, Rfl: 1  •  norethindrone (MICRONOR) 0.35 MG tablet, Take 1 tablet by mouth Daily., Disp: 28 tablet, Rfl: 12  •  PARoxetine (Paxil) 10 MG tablet, Take " 1 tablet by mouth Every Morning., Disp: 30 tablet, Rfl: 1      Allergies: Corticosteroids and Prednisone    Physical Exam  Constitutional:       Appearance: Normal appearance.   HENT:      Head: Normocephalic.   Eyes:      Conjunctiva/sclera: Conjunctivae normal.      Pupils: Pupils are equal, round, and reactive to light.   Cardiovascular:      Rate and Rhythm: Normal rate and regular rhythm.      Heart sounds: Normal heart sounds.   Pulmonary:      Effort: Pulmonary effort is normal.      Breath sounds: Normal breath sounds.   Abdominal:      Tenderness: There is no abdominal tenderness.   Musculoskeletal:         General: Normal range of motion.   Skin:     General: Skin is warm and dry.      Capillary Refill: Capillary refill takes less than 2 seconds.   Neurological:      General: No focal deficit present.      Mental Status: She is alert and oriented to person, place, and time.   Psychiatric:         Mood and Affect: Mood normal.         Behavior: Behavior normal.         Thought Content: Thought content normal.         Judgment: Judgment normal.          Result Review :                   Assessment and Plan    Diagnoses and all orders for this visit:    1. Generalized anxiety disorder (Primary)  Comments:  Try Paxil. Continue Hydroxyzine as needed for acute anxiety. RTC in 1 month for follow up or sooner for worsened sx.   Orders:  -     TSH; Future  -     T3; Future  -     T4, free; Future  -     TSH  -     T3  -     T4, free  -     PARoxetine (Paxil) 10 MG tablet; Take 1 tablet by mouth Every Morning.  Dispense: 30 tablet; Refill: 1  -     hydrOXYzine (ATARAX) 25 MG tablet; Take 1 tablet by mouth Every 8 (Eight) Hours As Needed for Anxiety.  Dispense: 30 tablet; Refill: 1    2. Tachycardia  Comments:  Labs drawn.   Orders:  -     TSH; Future  -     T3; Future  -     T4, free; Future  -     TSH  -     T3  -     T4, free    3. Enlarged thyroid  Comments:  Labs drawn and thyroid US ordered.   Orders:  -     US  Thyroid                Follow Up   Return in about 1 month (around 10/30/2022) for Recheck.  Patient was given instructions and counseling regarding her condition or for health maintenance advice. Please see specific information pulled into the AVS if appropriate.     SHIRIN Desai

## 2022-10-01 LAB
T3 SERPL-MCNC: 107 NG/DL (ref 71–180)
T4 FREE SERPL-MCNC: 1.34 NG/DL (ref 0.82–1.77)
TSH SERPL DL<=0.005 MIU/L-ACNC: 0.64 UIU/ML (ref 0.45–4.5)

## 2022-10-28 ENCOUNTER — OFFICE VISIT (OUTPATIENT)
Dept: FAMILY MEDICINE CLINIC | Facility: CLINIC | Age: 27
End: 2022-10-28

## 2022-10-28 VITALS
HEART RATE: 90 BPM | SYSTOLIC BLOOD PRESSURE: 128 MMHG | OXYGEN SATURATION: 100 % | HEIGHT: 68 IN | DIASTOLIC BLOOD PRESSURE: 80 MMHG | WEIGHT: 191 LBS | BODY MASS INDEX: 28.95 KG/M2

## 2022-10-28 DIAGNOSIS — E05.90 HYPERTHYROIDISM: Primary | ICD-10-CM

## 2022-10-28 DIAGNOSIS — F41.1 GENERALIZED ANXIETY DISORDER: ICD-10-CM

## 2022-10-28 PROCEDURE — 99213 OFFICE O/P EST LOW 20 MIN: CPT | Performed by: NURSE PRACTITIONER

## 2022-10-28 RX ORDER — HYDROXYZINE HYDROCHLORIDE 25 MG/1
25 TABLET, FILM COATED ORAL EVERY 8 HOURS PRN
Qty: 90 TABLET | Refills: 1 | Status: SHIPPED | OUTPATIENT
Start: 2022-10-28 | End: 2022-12-28 | Stop reason: SDUPTHER

## 2022-10-28 RX ORDER — PAROXETINE HYDROCHLORIDE 20 MG/1
20 TABLET, FILM COATED ORAL EVERY MORNING
Qty: 30 TABLET | Refills: 1 | Status: SHIPPED | OUTPATIENT
Start: 2022-10-28 | End: 2022-12-28

## 2022-10-28 RX ORDER — PROPRANOLOL HYDROCHLORIDE 20 MG/1
20 TABLET ORAL 3 TIMES DAILY
Qty: 90 TABLET | Refills: 0 | Status: SHIPPED | OUTPATIENT
Start: 2022-10-28 | End: 2022-12-28 | Stop reason: SDUPTHER

## 2022-10-28 NOTE — PROGRESS NOTES
"Chief Complaint  Anxiety (1 month follow up )    Subjective          Salina De La Fuente presents to [facility]  History of Present Illness  Pt is here to follow up on anxiety. She began Paxil one month ago and is taking Hydroxyzine. She feels the hydroxyzine helps minimally with sleep. She feels that her anxiety is not improved.       Objective   Vital Signs:   /80 (BP Location: Left arm, Patient Position: Sitting)   Pulse 90   Ht 172.7 cm (68\")   Wt 86.6 kg (191 lb)   SpO2 100%   BMI 29.04 kg/m²     Body mass index is 29.04 kg/m².            Current Outpatient Medications:   •  Etonogestrel (Nexplanon) 68 MG implant subdermal implant, Inject 1 each into the appropriate area of the skin as directed by provider 1 (One) Time., Disp: , Rfl:   •  hydrOXYzine (ATARAX) 25 MG tablet, Take 1 tablet by mouth Every 8 (Eight) Hours As Needed for Anxiety., Disp: 90 tablet, Rfl: 1  •  loratadine (CLARITIN) 10 MG tablet, Take 10 mg by mouth Daily., Disp: , Rfl:   •  PARoxetine (Paxil) 20 MG tablet, Take 1 tablet by mouth Every Morning., Disp: 30 tablet, Rfl: 1  •  propranolol (INDERAL) 20 MG tablet, Take 1 tablet by mouth 3 (Three) Times a Day., Disp: 90 tablet, Rfl: 0      Allergies: Corticosteroids and Prednisone    Physical Exam     Result Review :                   Assessment and Plan    Diagnoses and all orders for this visit:    1. Hyperthyroidism (Primary)  Comments:  Pt's TSH has been abnormal in the past with family h/o thyroid issues. F/U with endo.   Orders:  -     Ambulatory Referral to Endocrinology    2. Generalized anxiety disorder  Comments:  Raise dose of Paxil. Continue Hydroxyzine HS. Try Propranolol PRN acute anxiety. F/u 1 month.    Orders:  -     PARoxetine (Paxil) 20 MG tablet; Take 1 tablet by mouth Every Morning.  Dispense: 30 tablet; Refill: 1  -     propranolol (INDERAL) 20 MG tablet; Take 1 tablet by mouth 3 (Three) Times a Day.  Dispense: 90 tablet; Refill: 0  -     " hydrOXYzine (ATARAX) 25 MG tablet; Take 1 tablet by mouth Every 8 (Eight) Hours As Needed for Anxiety.  Dispense: 90 tablet; Refill: 1  -     Ambulatory Referral to Endocrinology                Follow Up   No follow-ups on file.  Patient was given instructions and counseling regarding her condition or for health maintenance advice. Please see specific information pulled into the AVS if appropriate.     SHIRIN Desai

## 2022-11-28 ENCOUNTER — OFFICE VISIT (OUTPATIENT)
Dept: FAMILY MEDICINE CLINIC | Facility: CLINIC | Age: 27
End: 2022-11-28

## 2022-11-28 VITALS
BODY MASS INDEX: 30.16 KG/M2 | OXYGEN SATURATION: 98 % | WEIGHT: 199 LBS | HEIGHT: 68 IN | DIASTOLIC BLOOD PRESSURE: 78 MMHG | SYSTOLIC BLOOD PRESSURE: 110 MMHG | HEART RATE: 82 BPM

## 2022-11-28 DIAGNOSIS — F51.01 PRIMARY INSOMNIA: ICD-10-CM

## 2022-11-28 DIAGNOSIS — E05.90 HYPERTHYROIDISM: Primary | ICD-10-CM

## 2022-11-28 DIAGNOSIS — F41.1 GENERALIZED ANXIETY DISORDER: ICD-10-CM

## 2022-11-28 PROBLEM — O44.02 PLACENTA PREVIA IN SECOND TRIMESTER: Status: RESOLVED | Noted: 2020-08-12 | Resolved: 2022-11-28

## 2022-11-28 PROBLEM — Z37.9 NORMAL LABOR: Status: RESOLVED | Noted: 2020-12-21 | Resolved: 2022-11-28

## 2022-11-28 PROBLEM — Z3A.39 PREGNANCY WITH 39 COMPLETED WEEKS GESTATION: Status: RESOLVED | Noted: 2020-12-21 | Resolved: 2022-11-28

## 2022-11-28 PROCEDURE — 99213 OFFICE O/P EST LOW 20 MIN: CPT | Performed by: NURSE PRACTITIONER

## 2022-11-28 RX ORDER — TRAZODONE HYDROCHLORIDE 50 MG/1
TABLET ORAL
Qty: 60 TABLET | Refills: 5 | Status: SHIPPED | OUTPATIENT
Start: 2022-11-28

## 2022-11-28 NOTE — PROGRESS NOTES
"Chief Complaint  Anxiety (Does feel better, does have some bad days.)    Subjective          Salina De La Fuente presents to [facility]  History of Present Illness  Pt is here for follow up anxiety. She has been taking Paxil and feels this has helped. She has breakthrough anxiety at times and continues to have difficulty sleeping. She was scheduled for follow up with endocrinology but kept getting rescheduled so she needs another referral done.       Objective   Vital Signs:   /78   Pulse 82   Ht 172.7 cm (68\")   Wt 90.3 kg (199 lb)   SpO2 98%   BMI 30.26 kg/m²     Body mass index is 30.26 kg/m².    Review of Systems   Constitutional: Negative for fatigue and fever.   Respiratory: Negative for shortness of breath.    Cardiovascular: Negative for chest pain, palpitations and leg swelling.   Neurological: Negative for syncope.   Psychiatric/Behavioral: Positive for sleep disturbance. The patient is nervous/anxious.           Current Outpatient Medications:   •  Etonogestrel (Nexplanon) 68 MG implant subdermal implant, Inject 1 each into the appropriate area of the skin as directed by provider 1 (One) Time., Disp: , Rfl:   •  hydrOXYzine (ATARAX) 25 MG tablet, Take 1 tablet by mouth Every 8 (Eight) Hours As Needed for Anxiety., Disp: 90 tablet, Rfl: 1  •  loratadine (CLARITIN) 10 MG tablet, Take 10 mg by mouth Daily., Disp: , Rfl:   •  PARoxetine (Paxil) 20 MG tablet, Take 1 tablet by mouth Every Morning., Disp: 30 tablet, Rfl: 1  •  propranolol (INDERAL) 20 MG tablet, Take 1 tablet by mouth 3 (Three) Times a Day., Disp: 90 tablet, Rfl: 0  •  traZODone (DESYREL) 50 MG tablet, 1-2 tablets HS PRN insomnia, Disp: 60 tablet, Rfl: 5      Allergies: Corticosteroids and Prednisone    Physical Exam  Constitutional:       Appearance: Normal appearance.   HENT:      Head: Normocephalic.   Eyes:      Conjunctiva/sclera: Conjunctivae normal.      Pupils: Pupils are equal, round, and reactive to " light.   Cardiovascular:      Rate and Rhythm: Normal rate and regular rhythm.      Heart sounds: Normal heart sounds.   Pulmonary:      Effort: Pulmonary effort is normal.      Breath sounds: Normal breath sounds.   Abdominal:      Tenderness: There is no abdominal tenderness.   Musculoskeletal:         General: Normal range of motion.   Skin:     General: Skin is warm and dry.      Capillary Refill: Capillary refill takes less than 2 seconds.   Neurological:      General: No focal deficit present.      Mental Status: She is alert and oriented to person, place, and time.   Psychiatric:         Mood and Affect: Mood normal.         Behavior: Behavior normal.         Thought Content: Thought content normal.         Judgment: Judgment normal.          Result Review :                   Assessment and Plan    Diagnoses and all orders for this visit:    1. Hyperthyroidism (Primary)  Comments:  F/U with endocrinology as previously referred.   Orders:  -     Ambulatory Referral to Endocrinology    2. Generalized anxiety disorder  Comments:  Continue Paxil and Propranolol/Hydroxyzine as needed. Add Trazodone for insomnia. RTC in 1month if not improving.    3. Primary insomnia  Comments:  Trazodone as needed for insomnia. RTC in 1 month if not improving.   Orders:  -     traZODone (DESYREL) 50 MG tablet; 1-2 tablets HS PRN insomnia  Dispense: 60 tablet; Refill: 5                Follow Up   Return in about 1 month (around 12/28/2022) for Recheck.  Patient was given instructions and counseling regarding her condition or for health maintenance advice. Please see specific information pulled into the AVS if appropriate.     SHIRIN Desai

## 2022-12-28 ENCOUNTER — OFFICE VISIT (OUTPATIENT)
Dept: FAMILY MEDICINE CLINIC | Facility: CLINIC | Age: 27
End: 2022-12-28

## 2022-12-28 VITALS
BODY MASS INDEX: 34.15 KG/M2 | SYSTOLIC BLOOD PRESSURE: 130 MMHG | DIASTOLIC BLOOD PRESSURE: 80 MMHG | WEIGHT: 200 LBS | HEART RATE: 94 BPM | OXYGEN SATURATION: 99 % | HEIGHT: 64 IN

## 2022-12-28 DIAGNOSIS — F51.01 PRIMARY INSOMNIA: Primary | ICD-10-CM

## 2022-12-28 DIAGNOSIS — F41.1 GENERALIZED ANXIETY DISORDER: ICD-10-CM

## 2022-12-28 PROCEDURE — 99213 OFFICE O/P EST LOW 20 MIN: CPT | Performed by: NURSE PRACTITIONER

## 2022-12-28 RX ORDER — HYDROXYZINE HYDROCHLORIDE 25 MG/1
25 TABLET, FILM COATED ORAL EVERY 8 HOURS PRN
Qty: 90 TABLET | Refills: 1 | Status: SHIPPED | OUTPATIENT
Start: 2022-12-28 | End: 2023-03-20

## 2022-12-28 RX ORDER — PROPRANOLOL HYDROCHLORIDE 20 MG/1
20 TABLET ORAL 3 TIMES DAILY
Qty: 90 TABLET | Refills: 0 | Status: SHIPPED | OUTPATIENT
Start: 2022-12-28

## 2022-12-28 RX ORDER — PAROXETINE HYDROCHLORIDE 40 MG/1
40 TABLET, FILM COATED ORAL EVERY MORNING
Qty: 90 TABLET | Refills: 1 | Status: SHIPPED | OUTPATIENT
Start: 2022-12-28

## 2022-12-28 NOTE — PROGRESS NOTES
"Chief Complaint  Anxiety    Subjective          Salina De La Fuente presents to [facility]  History of Present Illness  Pt is here to follow up on anxiety. She has been taking Trazodone at bedtime some nights and feels this helps with insomnia and anxiety. She feels that her anxiety has improved but she continues to take propranolol or hydroxyzine at times as needed.       Objective   Vital Signs:   /80   Pulse 94   Ht 162.6 cm (64\")   Wt 90.7 kg (200 lb)   SpO2 99%   BMI 34.33 kg/m²     Body mass index is 34.33 kg/m².    Review of Systems   Constitutional: Negative for fatigue and fever.   Respiratory: Negative for shortness of breath.    Cardiovascular: Negative for chest pain, palpitations and leg swelling.   Neurological: Negative for syncope.   Psychiatric/Behavioral: Positive for sleep disturbance. The patient is nervous/anxious.           Current Outpatient Medications:   •  Etonogestrel (Nexplanon) 68 MG implant subdermal implant, Inject 1 each into the appropriate area of the skin as directed by provider 1 (One) Time., Disp: , Rfl:   •  hydrOXYzine (ATARAX) 25 MG tablet, Take 1 tablet by mouth Every 8 (Eight) Hours As Needed for Anxiety., Disp: 90 tablet, Rfl: 1  •  loratadine (CLARITIN) 10 MG tablet, Take 10 mg by mouth Daily., Disp: , Rfl:   •  propranolol (INDERAL) 20 MG tablet, Take 1 tablet by mouth 3 (Three) Times a Day., Disp: 90 tablet, Rfl: 0  •  traZODone (DESYREL) 50 MG tablet, 1-2 tablets HS PRN insomnia, Disp: 60 tablet, Rfl: 5  •  PARoxetine (Paxil) 40 MG tablet, Take 1 tablet by mouth Every Morning., Disp: 90 tablet, Rfl: 1      Allergies: Corticosteroids and Prednisone    Physical Exam  Constitutional:       Appearance: Normal appearance.   HENT:      Head: Normocephalic.   Eyes:      Conjunctiva/sclera: Conjunctivae normal.      Pupils: Pupils are equal, round, and reactive to light.   Cardiovascular:      Rate and Rhythm: Normal rate and regular rhythm. "      Heart sounds: Normal heart sounds.   Pulmonary:      Effort: Pulmonary effort is normal.      Breath sounds: Normal breath sounds.   Abdominal:      Tenderness: There is no abdominal tenderness.   Musculoskeletal:         General: Normal range of motion.   Skin:     General: Skin is warm and dry.      Capillary Refill: Capillary refill takes less than 2 seconds.   Neurological:      General: No focal deficit present.      Mental Status: She is alert and oriented to person, place, and time.   Psychiatric:         Mood and Affect: Mood normal.         Behavior: Behavior normal.         Thought Content: Thought content normal.         Judgment: Judgment normal.          Result Review :                   Assessment and Plan    Diagnoses and all orders for this visit:    1. Primary insomnia (Primary)  Comments:  Continue Trazodone HS.     2. Generalized anxiety disorder  Comments:  Raise dose of Paxil. Continue Hydroxyzine HS. Continue Propranolol PRN acute anxiety.   Orders:  -     PARoxetine (Paxil) 40 MG tablet; Take 1 tablet by mouth Every Morning.  Dispense: 90 tablet; Refill: 1  -     hydrOXYzine (ATARAX) 25 MG tablet; Take 1 tablet by mouth Every 8 (Eight) Hours As Needed for Anxiety.  Dispense: 90 tablet; Refill: 1  -     propranolol (INDERAL) 20 MG tablet; Take 1 tablet by mouth 3 (Three) Times a Day.  Dispense: 90 tablet; Refill: 0                Follow Up   Return in about 6 months (around 6/28/2023) for Recheck.  Patient was given instructions and counseling regarding her condition or for health maintenance advice. Please see specific information pulled into the AVS if appropriate.     SHIRIN Desai

## 2023-01-09 NOTE — PROGRESS NOTES
"Chief complaint/Reason for consult: Abnormal thyroid function test    Consult requested by SHIRIN Lynch    HPI: Ms. De La Fuente is a 27-year-old female with insomnia and history of gestational diabetes who comes for evaluation of abnormal thyroid function test.    Labs reviewed   9/30/2022  TSH 0.639  Free T4 1.34  Total T3 107    6/10/2020 during pregnancy   TSH 0.064  Free T4 1.28    5/23/2018 during pregnancy   TSH 0.194  Free T4 1.51    Thyroid ultrasound on 10/11/2022 with heterogenous thyroid gland on the upper limit of normal for size and subcentimeter hypoechoic echoic nodules that do not meet criteria for FNA.    Patient reports her great grandmother had Hashimoto's thyroiditis, her grandmother has hyperthyroidism and two maternal aunts with Graves Disease.     She does not take biotin.     She reports weight gain, occasional palpitations, bothersome anxiety, and trouble sleeping.  She reports alternating constipation and diarrhea.      She take propranolol occasionally to help with her palpitations but does not find it helps her symptoms much at all.    Past medical history, past surgical history, family history and social history reviewed within this encounter.     Review of Systems   Constitutional: Positive for unexpected weight change. Negative for activity change.   HENT: Negative for trouble swallowing.    Eyes: Negative for visual disturbance.   Respiratory: Negative for shortness of breath.    Cardiovascular: Positive for palpitations.   Gastrointestinal: Positive for abdominal pain, constipation and diarrhea.   Endocrine: Negative for cold intolerance and heat intolerance.   Skin: Negative for rash.   Neurological: Negative for dizziness.   Psychiatric/Behavioral: Positive for decreased concentration, dysphoric mood and sleep disturbance. The patient is nervous/anxious.         /82   Pulse 104   Ht 162.6 cm (64\")   Wt 91.2 kg (201 lb)   SpO2 99%   BMI 34.50 kg/m²      Physical " Exam  Vitals reviewed.   Constitutional:       General: She is not in acute distress.     Appearance: She is obese.   HENT:      Head: Normocephalic.   Eyes:      Conjunctiva/sclera: Conjunctivae normal.   Neck:      Comments: Easily palpable thyroid without goiter, no palpable thyroid nodules  Cardiovascular:      Rate and Rhythm: Normal rate.   Pulmonary:      Effort: Pulmonary effort is normal.   Abdominal:      Tenderness: There is no guarding.   Musculoskeletal:         General: Normal range of motion.      Cervical back: Neck supple.   Skin:     General: Skin is warm and dry.   Neurological:      Mental Status: She is alert and oriented to person, place, and time.   Psychiatric:         Mood and Affect: Mood normal.         Behavior: Behavior normal.        Labs and images reviewed as noted in the HPI    Assessment and plan:    Diagnoses and all orders for this visit:    1. Abnormal thyroid function test (Primary)  Assessment & Plan:  - Patient with strong family history of autoimmune thyroid disease although at this time her most recent thyroid function tests are normal  -I do not suspect that her symptoms are due to thyroid dysfunction but given her family history she is likely at future risk for development of thyroid dysfunction  -We will screen for autoimmune thyroid disease to rule stratify her risk of disease moving forward with TPO antibody and thyrotropin receptor antibody today  -Discussed with pt that thyroid Abs can be found, although usually in lower concentration, in many people with no clinical or biochemical evidence of thyroid disease. On average, up to 20 percent of all women may have such antibodies but do not have thyroid hormone dysfunction. Reviewed with patient that a TSH level alone will be sufficient when screening for thyroid hormone dysfunction.   -Recommend continue work with primary care physician to determine etiology of her current symptoms if thyroid function tests today are  normal         Orders:  -     TSH; Future  -     T4, Free; Future  -     T3; Future  -     Thyroid Peroxidase Antibody; Future  -     Thyrotropin Receptor Antibody; Future  -     CBC (No Diff); Future  -     Comprehensive Metabolic Panel; Future    2. History of gestational diabetes mellitus  Assessment & Plan:  -Patient with gestational diabetes in 2016, recommend she have regular screening for type 2 diabetes mellitus   -We will check hemoglobin A1c today along with a CBC to ensure she does not have anemia    Orders:  -     Hemoglobin A1c; Future    3. Diarrhea, unspecified type  Assessment & Plan:  -Patient with strong family history of autoimmune disease  -Recommend screening for celiac disease with TTG IgA and total IgA    Orders:  -     Tissue Transglutaminase, IgA; Future  -     IgA; Future    4. Thyroid nodule  Assessment & Plan:  -Patient with prior ultrasound done 10/11/2022 showing subcentimeter nodules that did not meet criteria for FNA  -Recommend repeat thyroid ultrasound in 6 to 8 months         Return in about 6 months (around 7/11/2023) for Thyroid nodules with ultrasound (in [place]) .     I spent 35 minutes caring for Salina on this date of service. This time includes time spent by me in the following activities: preparing for the visit, obtaining and/or reviewing a separately obtained history, performing a medically appropriate examination and/or evaluation, counseling and educating the patient/family/caregiver, ordering medications, tests, or procedures and documenting information in the medical record     Electronically signed by: Kyle S Rosenstein, MD   Addendum  Labs 1/11/2023  CMP unremarkable other than mild elevation of AST/ALT at 50 and 55 respectively.  Hemoglobin A1c 5.3%  TSH 0.652  Free T4 1.46  Total T3 141  TPO antibodies negative  TrAb negative   CBC unremarkable  TTG IgA negative with normal IgA of 191    At this time patient has no biochemical evidence suggestive of thyroid  dysfunction.  Recommend continued work-up for her symptoms as directed by primary care physician.    I recommend she have her primary care physician follow-up her slightly abnormal AST/ALT.    She has no evidence for celiac disease at this time.    Results communicated via [employer].

## 2023-01-11 ENCOUNTER — LAB (OUTPATIENT)
Dept: FAMILY MEDICINE CLINIC | Facility: CLINIC | Age: 28
End: 2023-01-11
Payer: COMMERCIAL

## 2023-01-11 ENCOUNTER — OFFICE VISIT (OUTPATIENT)
Dept: ENDOCRINOLOGY | Facility: CLINIC | Age: 28
End: 2023-01-11
Payer: COMMERCIAL

## 2023-01-11 VITALS
BODY MASS INDEX: 34.31 KG/M2 | DIASTOLIC BLOOD PRESSURE: 82 MMHG | WEIGHT: 201 LBS | HEART RATE: 104 BPM | HEIGHT: 64 IN | SYSTOLIC BLOOD PRESSURE: 140 MMHG | OXYGEN SATURATION: 99 %

## 2023-01-11 DIAGNOSIS — R94.6 ABNORMAL THYROID FUNCTION TEST: ICD-10-CM

## 2023-01-11 DIAGNOSIS — Z86.32 HISTORY OF GESTATIONAL DIABETES MELLITUS: ICD-10-CM

## 2023-01-11 DIAGNOSIS — R19.7 DIARRHEA, UNSPECIFIED TYPE: ICD-10-CM

## 2023-01-11 DIAGNOSIS — R94.6 ABNORMAL THYROID FUNCTION TEST: Primary | ICD-10-CM

## 2023-01-11 DIAGNOSIS — E04.1 THYROID NODULE: ICD-10-CM

## 2023-01-11 PROCEDURE — 36415 COLL VENOUS BLD VENIPUNCTURE: CPT | Performed by: HOSPITALIST

## 2023-01-11 PROCEDURE — 99204 OFFICE O/P NEW MOD 45 MIN: CPT | Performed by: HOSPITALIST

## 2023-01-11 NOTE — ASSESSMENT & PLAN NOTE
-Patient with gestational diabetes in 2016, recommend she have regular screening for type 2 diabetes mellitus   -We will check hemoglobin A1c today along with a CBC to ensure she does not have anemia

## 2023-01-11 NOTE — ASSESSMENT & PLAN NOTE
- Patient with strong family history of autoimmune thyroid disease although at this time her most recent thyroid function tests are normal  -I do not suspect that her symptoms are due to thyroid dysfunction but given her family history she is likely at future risk for development of thyroid dysfunction  -We will screen for autoimmune thyroid disease to rule stratify her risk of disease moving forward with TPO antibody and thyrotropin receptor antibody today  -Discussed with pt that thyroid Abs can be found, although usually in lower concentration, in many people with no clinical or biochemical evidence of thyroid disease. On average, up to 20 percent of all women may have such antibodies but do not have thyroid hormone dysfunction. Reviewed with patient that a TSH level alone will be sufficient when screening for thyroid hormone dysfunction.   -Recommend continue work with primary care physician to determine etiology of her current symptoms if thyroid function tests today are normal

## 2023-01-11 NOTE — ASSESSMENT & PLAN NOTE
-Patient with strong family history of autoimmune disease  -Recommend screening for celiac disease with TTG IgA and total IgA

## 2023-01-11 NOTE — ASSESSMENT & PLAN NOTE
-Patient with prior ultrasound done 10/11/2022 showing subcentimeter nodules that did not meet criteria for FNA  -Recommend repeat thyroid ultrasound in 6 to 8 months

## 2023-01-12 LAB
ALBUMIN SERPL-MCNC: 4.7 G/DL (ref 3.9–5)
ALBUMIN/GLOB SERPL: 1.6 {RATIO} (ref 1.2–2.2)
ALP SERPL-CCNC: 72 IU/L (ref 44–121)
ALT SERPL-CCNC: 55 IU/L (ref 0–32)
AST SERPL-CCNC: 50 IU/L (ref 0–40)
BILIRUB SERPL-MCNC: 0.4 MG/DL (ref 0–1.2)
BUN SERPL-MCNC: 12 MG/DL (ref 6–20)
BUN/CREAT SERPL: 18 (ref 9–23)
CALCIUM SERPL-MCNC: 9.2 MG/DL (ref 8.7–10.2)
CHLORIDE SERPL-SCNC: 103 MMOL/L (ref 96–106)
CO2 SERPL-SCNC: 20 MMOL/L (ref 20–29)
CREAT SERPL-MCNC: 0.65 MG/DL (ref 0.57–1)
EGFRCR SERPLBLD CKD-EPI 2021: 122 ML/MIN/1.73
ERYTHROCYTE [DISTWIDTH] IN BLOOD BY AUTOMATED COUNT: 12 % (ref 11.7–15.4)
GLOBULIN SER CALC-MCNC: 2.9 G/DL (ref 1.5–4.5)
GLUCOSE SERPL-MCNC: 87 MG/DL (ref 70–99)
HBA1C MFR BLD: 5.3 % (ref 4.8–5.6)
HCT VFR BLD AUTO: 40.6 % (ref 34–46.6)
HGB BLD-MCNC: 13.8 G/DL (ref 11.1–15.9)
IGA SERPL-MCNC: 191 MG/DL (ref 87–352)
MCH RBC QN AUTO: 31 PG (ref 26.6–33)
MCHC RBC AUTO-ENTMCNC: 34 G/DL (ref 31.5–35.7)
MCV RBC AUTO: 91 FL (ref 79–97)
PLATELET # BLD AUTO: 182 X10E3/UL (ref 150–450)
POTASSIUM SERPL-SCNC: 4 MMOL/L (ref 3.5–5.2)
PROT SERPL-MCNC: 7.6 G/DL (ref 6–8.5)
RBC # BLD AUTO: 4.45 X10E6/UL (ref 3.77–5.28)
SODIUM SERPL-SCNC: 138 MMOL/L (ref 134–144)
T3 SERPL-MCNC: 141 NG/DL (ref 71–180)
T4 FREE SERPL-MCNC: 1.46 NG/DL (ref 0.82–1.77)
THYROPEROXIDASE AB SERPL-ACNC: <9 IU/ML (ref 0–34)
TSH SERPL DL<=0.005 MIU/L-ACNC: 0.65 UIU/ML (ref 0.45–4.5)
TTG IGA SER-ACNC: <2 U/ML (ref 0–3)
WBC # BLD AUTO: 4 X10E3/UL (ref 3.4–10.8)

## 2023-01-16 LAB — TSH RECEP AB SER-ACNC: <1.1 IU/L (ref 0–1.75)

## 2023-02-06 ENCOUNTER — OFFICE VISIT (OUTPATIENT)
Dept: FAMILY MEDICINE CLINIC | Facility: CLINIC | Age: 28
End: 2023-02-06
Payer: COMMERCIAL

## 2023-02-06 VITALS
WEIGHT: 199 LBS | DIASTOLIC BLOOD PRESSURE: 90 MMHG | HEIGHT: 64 IN | OXYGEN SATURATION: 98 % | HEART RATE: 78 BPM | BODY MASS INDEX: 33.97 KG/M2 | SYSTOLIC BLOOD PRESSURE: 130 MMHG

## 2023-02-06 DIAGNOSIS — R30.0 DYSURIA: Primary | ICD-10-CM

## 2023-02-06 DIAGNOSIS — R07.89 OTHER CHEST PAIN: ICD-10-CM

## 2023-02-06 DIAGNOSIS — M25.562 ACUTE PAIN OF LEFT KNEE: ICD-10-CM

## 2023-02-06 DIAGNOSIS — M25.561 ACUTE PAIN OF RIGHT KNEE: ICD-10-CM

## 2023-02-06 PROBLEM — R19.7 DIARRHEA: Status: RESOLVED | Noted: 2023-01-11 | Resolved: 2023-02-06

## 2023-02-06 LAB
BILIRUB BLD-MCNC: NEGATIVE MG/DL
CLARITY, POC: CLEAR
COLOR UR: YELLOW
EXPIRATION DATE: NORMAL
GLUCOSE UR STRIP-MCNC: NEGATIVE MG/DL
KETONES UR QL: NEGATIVE
LEUKOCYTE EST, POC: NEGATIVE
Lab: NORMAL
NITRITE UR-MCNC: NEGATIVE MG/ML
PH UR: 5.5 [PH] (ref 5–8)
PROT UR STRIP-MCNC: NEGATIVE MG/DL
RBC # UR STRIP: NEGATIVE /UL
SP GR UR: 1.01 (ref 1–1.03)
UROBILINOGEN UR QL: NORMAL

## 2023-02-06 PROCEDURE — 99214 OFFICE O/P EST MOD 30 MIN: CPT | Performed by: NURSE PRACTITIONER

## 2023-02-06 PROCEDURE — 93000 ELECTROCARDIOGRAM COMPLETE: CPT | Performed by: NURSE PRACTITIONER

## 2023-02-06 RX ORDER — FLUCONAZOLE 150 MG/1
150 TABLET ORAL ONCE
Qty: 1 TABLET | Refills: 0 | Status: SHIPPED | OUTPATIENT
Start: 2023-02-06 | End: 2023-02-06

## 2023-02-06 RX ORDER — CEFDINIR 300 MG/1
300 CAPSULE ORAL 2 TIMES DAILY
Qty: 14 CAPSULE | Refills: 0 | Status: SHIPPED | OUTPATIENT
Start: 2023-02-06

## 2023-02-06 NOTE — PROGRESS NOTES
"Chief Complaint  Knee Pain (Since fall. ), Urinary Tract Infection, and Chest Pain (Left side of chest x 1 week. Worse when moving a certain way and deep breaths and cough. Feels in back as well. )    Chris De La Fuente presents to [facility]  History of Present Illness  Patient fell and landed on her right knee in November and has had knee pain since then.  She twisted her knee in December and has had left knee pain since then.  She has had a knot in her right hand for several months but is tender at times.  She feels that she has had a UTI for the past few days.  She has burning and frequency at times.  She has had tenderness in the left side of her chest x1 week.  Her pain is positional and is exacerbated with reaching over her head. she denies shortness of breath, cough, or reflux.      Objective   Vital Signs:   /90   Pulse 78   Ht 162.6 cm (64\")   Wt 90.3 kg (199 lb)   SpO2 98%   BMI 34.16 kg/m²     Body mass index is 34.16 kg/m².    Review of Systems   Constitutional: Negative for fatigue and fever.   Respiratory: Negative for shortness of breath.    Cardiovascular: Positive for chest pain. Negative for palpitations and leg swelling.   Genitourinary: Positive for frequency.   Musculoskeletal: Positive for arthralgias.   Neurological: Negative for syncope.   Psychiatric/Behavioral: The patient is not nervous/anxious.           Current Outpatient Medications:   •  Etonogestrel (Nexplanon) 68 MG implant subdermal implant, Inject 1 each into the appropriate area of the skin as directed by provider 1 (One) Time., Disp: , Rfl:   •  hydrOXYzine (ATARAX) 25 MG tablet, Take 1 tablet by mouth Every 8 (Eight) Hours As Needed for Anxiety., Disp: 90 tablet, Rfl: 1  •  loratadine (CLARITIN) 10 MG tablet, Take 10 mg by mouth Daily., Disp: , Rfl:   •  PARoxetine (Paxil) 40 MG tablet, Take 1 tablet by mouth Every Morning., Disp: 90 tablet, Rfl: 1  •  propranolol " (INDERAL) 20 MG tablet, Take 1 tablet by mouth 3 (Three) Times a Day., Disp: 90 tablet, Rfl: 0  •  traZODone (DESYREL) 50 MG tablet, 1-2 tablets HS PRN insomnia, Disp: 60 tablet, Rfl: 5  •  cefdinir (OMNICEF) 300 MG capsule, Take 1 capsule by mouth 2 (Two) Times a Day., Disp: 14 capsule, Rfl: 0  •  fluconazole (Diflucan) 150 MG tablet, Take 1 tablet by mouth 1 (One) Time for 1 dose., Disp: 1 tablet, Rfl: 0      Allergies: Corticosteroids and Prednisone    Physical Exam  Constitutional:       Appearance: Normal appearance.   HENT:      Head: Normocephalic.   Eyes:      Conjunctiva/sclera: Conjunctivae normal.      Pupils: Pupils are equal, round, and reactive to light.   Cardiovascular:      Rate and Rhythm: Normal rate and regular rhythm.      Heart sounds: Normal heart sounds.   Pulmonary:      Effort: Pulmonary effort is normal.      Breath sounds: Normal breath sounds.   Abdominal:      Tenderness: There is no abdominal tenderness.   Musculoskeletal:         General: Normal range of motion.      Comments: Tenderness bilateral knees.   Skin:     General: Skin is warm and dry.      Capillary Refill: Capillary refill takes less than 2 seconds.   Neurological:      General: No focal deficit present.      Mental Status: She is alert and oriented to person, place, and time.   Psychiatric:         Mood and Affect: Mood normal.         Behavior: Behavior normal.         Thought Content: Thought content normal.         Judgment: Judgment normal.          Result Review :            ECG 12 Lead    Date/Time: 2/6/2023 4:04 PM  Performed by: Catherine Johnson APRN  Authorized by: Catherine Johnson APRN   Comparison: not compared with previous ECG   Rhythm: sinus rhythm  Rate: normal  Conduction: conduction normal  ST Segments: ST segments normal  T Waves: T waves normal  QRS axis: normal  Other: no other findings    Clinical impression: normal ECG                Assessment and Plan    Diagnoses and all orders for this  visit:    1. Dysuria (Primary)  Comments:  UA and culture done.  Finish antibiotics and increase fluids.  Return for worsening symptoms.  Orders:  -     Urine Culture - Urine, Urine, Clean Catch; Future  -     POC Urinalysis Dipstick, Automated  -     Urine Culture - Urine, Urine, Clean Catch  -     cefdinir (OMNICEF) 300 MG capsule; Take 1 capsule by mouth 2 (Two) Times a Day.  Dispense: 14 capsule; Refill: 0  -     fluconazole (Diflucan) 150 MG tablet; Take 1 tablet by mouth 1 (One) Time for 1 dose.  Dispense: 1 tablet; Refill: 0    2. Acute pain of left knee  Comments:  X-rays none.  MRI ordered.  We will refer to Ortho if not improving.  Apply ice to painful areas.  Orders:  -     XR Knee 1 or 2 View Left; Future  -     MRI Knee Left Without Contrast; Future    3. Acute pain of right knee  Comments:  X-rays ordered.  Apply ice to painful areas.  We will refer to Ortho if not improving.  Orders:  -     XR Knee 3+ View With Sunrise Right; Future    4. Other chest pain  Comments:  EKG normal sinus rhythm.  Return for worsening symptoms and if not improving in 1 to 2 weeks.  Go to the ER for severe symptoms.  Orders:  -     ECG 12 Lead                Follow Up   Return in about 1 week (around 2/13/2023) for if not improving or sooner if symptoms worsen.  Patient was given instructions and counseling regarding her condition or for health maintenance advice. Please see specific information pulled into the AVS if appropriate.     SHIRIN Desai

## 2023-02-10 LAB
BACTERIA UR CULT: ABNORMAL
BACTERIA UR CULT: ABNORMAL
OTHER ANTIBIOTIC SUSC ISLT: ABNORMAL

## 2023-02-10 RX ORDER — CIPROFLOXACIN 500 MG/1
500 TABLET, FILM COATED ORAL 2 TIMES DAILY
Qty: 14 TABLET | Refills: 0 | Status: SHIPPED | OUTPATIENT
Start: 2023-02-10

## 2023-02-10 NOTE — PROGRESS NOTES
Your urine culture showed infection that showed resistance to the antibiotic that I sent.  I sent a new antibiotic to replace this to your pharmacy.  Let me know if your symptoms are not improving.  Take care!

## 2023-02-13 ENCOUNTER — TELEPHONE (OUTPATIENT)
Dept: FAMILY MEDICINE CLINIC | Facility: CLINIC | Age: 28
End: 2023-02-13
Payer: COMMERCIAL

## 2023-02-13 NOTE — TELEPHONE ENCOUNTER
Caller: Salina De La Fuente    Relationship: Self    What was the call regarding: PATIENT WAS GIVEN HUB TO READ MESSAGE

## 2023-02-13 NOTE — TELEPHONE ENCOUNTER
"HUB TO READ:      \"Your urine culture showed infection that showed resistance to the antibiotic that I sent.  I sent a new antibiotic to replace this to your pharmacy.  Let me know if your symptoms are not improving.  Take care! \"    Multiple message left on results encounter    "

## 2023-03-11 ENCOUNTER — HOSPITAL ENCOUNTER (OUTPATIENT)
Dept: MRI IMAGING | Facility: HOSPITAL | Age: 28
Discharge: HOME | End: 2023-03-11
Admitting: NURSE PRACTITIONER
Payer: COMMERCIAL

## 2023-03-11 DIAGNOSIS — M25.562 ACUTE PAIN OF LEFT KNEE: ICD-10-CM

## 2023-03-11 PROCEDURE — 73721 MRI JNT OF LWR EXTRE W/O DYE: CPT

## 2023-03-13 NOTE — PROGRESS NOTES
Your MRI did not show any tears. You had some vague changes in the anatomy of your knee. Let's go see ortho and let them take a look. Thanks!

## 2023-03-19 DIAGNOSIS — F41.1 GENERALIZED ANXIETY DISORDER: ICD-10-CM

## 2023-03-20 RX ORDER — HYDROXYZINE HYDROCHLORIDE 25 MG/1
TABLET, FILM COATED ORAL
Qty: 90 TABLET | Refills: 1 | Status: SHIPPED | OUTPATIENT
Start: 2023-03-20

## 2023-08-09 DIAGNOSIS — F41.1 GENERALIZED ANXIETY DISORDER: ICD-10-CM

## 2023-08-09 RX ORDER — PAROXETINE HYDROCHLORIDE 40 MG/1
TABLET, FILM COATED ORAL
Qty: 30 TABLET | Refills: 0 | Status: SHIPPED | OUTPATIENT
Start: 2023-08-09

## 2023-08-16 DIAGNOSIS — F51.01 PRIMARY INSOMNIA: ICD-10-CM

## 2023-08-16 RX ORDER — TRAZODONE HYDROCHLORIDE 50 MG/1
TABLET ORAL
Qty: 60 TABLET | Refills: 0 | Status: SHIPPED | OUTPATIENT
Start: 2023-08-16

## 2023-09-06 DIAGNOSIS — F41.1 GENERALIZED ANXIETY DISORDER: ICD-10-CM

## 2023-09-06 RX ORDER — PAROXETINE HYDROCHLORIDE 40 MG/1
TABLET, FILM COATED ORAL
Qty: 30 TABLET | Refills: 0 | Status: SHIPPED | OUTPATIENT
Start: 2023-09-06

## 2023-09-20 DIAGNOSIS — F51.01 PRIMARY INSOMNIA: ICD-10-CM

## 2023-09-20 RX ORDER — TRAZODONE HYDROCHLORIDE 50 MG/1
TABLET ORAL
Qty: 60 TABLET | Refills: 0 | Status: SHIPPED | OUTPATIENT
Start: 2023-09-20

## 2023-09-20 NOTE — TELEPHONE ENCOUNTER
HUB TO READ: [facility] HAS REFILLED THEIR MEDICATION AND THEY NEED TO SCHEDULED THEIR PHYSICAL WITH HER

## 2023-09-20 NOTE — TELEPHONE ENCOUNTER
Lets get her in for a 6  month follow up. Actually due for physical if we can schedule her for that so she doesn't have to pay a copay.

## 2023-10-04 ENCOUNTER — OFFICE VISIT (OUTPATIENT)
Dept: FAMILY MEDICINE CLINIC | Facility: CLINIC | Age: 28
End: 2023-10-04
Payer: COMMERCIAL

## 2023-10-04 VITALS
HEART RATE: 97 BPM | DIASTOLIC BLOOD PRESSURE: 84 MMHG | SYSTOLIC BLOOD PRESSURE: 120 MMHG | BODY MASS INDEX: 33.63 KG/M2 | HEIGHT: 64 IN | WEIGHT: 197 LBS | OXYGEN SATURATION: 99 %

## 2023-10-04 DIAGNOSIS — F51.01 PRIMARY INSOMNIA: ICD-10-CM

## 2023-10-04 DIAGNOSIS — F41.1 GENERALIZED ANXIETY DISORDER: ICD-10-CM

## 2023-10-04 DIAGNOSIS — Z13.1 SCREENING FOR DIABETES MELLITUS: ICD-10-CM

## 2023-10-04 DIAGNOSIS — E56.9 VITAMIN DEFICIENCY: ICD-10-CM

## 2023-10-04 DIAGNOSIS — Z00.00 ROUTINE MEDICAL EXAM: Primary | ICD-10-CM

## 2023-10-04 DIAGNOSIS — Z13.220 SCREENING FOR LIPID DISORDERS: ICD-10-CM

## 2023-10-04 DIAGNOSIS — E55.9 VITAMIN D DEFICIENCY: ICD-10-CM

## 2023-10-04 RX ORDER — ESCITALOPRAM OXALATE 10 MG/1
10 TABLET ORAL DAILY
Qty: 90 TABLET | Refills: 3 | Status: SHIPPED | OUTPATIENT
Start: 2023-10-04

## 2023-10-04 RX ORDER — TRAZODONE HYDROCHLORIDE 50 MG/1
TABLET ORAL
Qty: 180 TABLET | Refills: 3 | Status: SHIPPED | OUTPATIENT
Start: 2023-10-04

## 2023-10-04 RX ORDER — HYDROXYZINE HYDROCHLORIDE 25 MG/1
25 TABLET, FILM COATED ORAL EVERY 8 HOURS PRN
Qty: 90 TABLET | Refills: 1 | Status: SHIPPED | OUTPATIENT
Start: 2023-10-04

## 2023-10-04 RX ORDER — PROPRANOLOL HYDROCHLORIDE 20 MG/1
20 TABLET ORAL 3 TIMES DAILY
Qty: 90 TABLET | Refills: 3 | Status: SHIPPED | OUTPATIENT
Start: 2023-10-04

## 2023-10-04 NOTE — PROGRESS NOTES
"Chief Complaint  Annual Exam    Subjective          Salina De La Fuente presents to [facility] for preventative yearly exam.   History of Present Illness  Pt is here for a physical exam and medication refills. She stopped taking Paxil due to diarrhea. She uses Hydroxyzine and Propranolol PRN.     Objective   Vital Signs:   /84   Pulse 97   Ht 162.6 cm (64\")   Wt 89.4 kg (197 lb)   SpO2 99%   BMI 33.81 kg/m²     Body mass index is 33.81 kg/m².    Predictive Model Details         1 (Low) Factor Value    Calculated 12/23/2020 10:03      Risk of Fall Model      *Archived Data           PHQ-9 Depression Screening  Little interest or pleasure in doing things? 0-->not at all   Feeling down, depressed, or hopeless? 0-->not at all   Trouble falling or staying asleep, or sleeping too much?     Feeling tired or having little energy?     Poor appetite or overeating?     Feeling bad about yourself - or that you are a failure or have let yourself or your family down?     Trouble concentrating on things, such as reading the newspaper or watching television?     Moving or speaking so slowly that other people could have noticed? Or the opposite - being so fidgety or restless that you have been moving around a lot more than usual?     Thoughts that you would be better off dead, or of hurting yourself in some way?     PHQ-9 Total Score 0   If you checked off any problems, how difficult have these problems made it for you to do your work, take care of things at home, or get along with other people?       Health Maintenance   Topic Date Due    COVID-19 Vaccine (1) 10/06/2023 (Originally 1995)    INFLUENZA VACCINE  03/31/2024 (Originally 8/1/2023)    BMI FOLLOWUP  10/28/2023    ANNUAL PHYSICAL  10/04/2024    PAP SMEAR  10/04/2025    TDAP/TD VACCINES (2 - Td or Tdap) 12/26/2028    HEPATITIS C SCREENING  Completed    Pneumococcal Vaccine 0-64  Aged Out        Immunization History   Administered " Date(s) Administered    Tdap 12/26/2018       Review of Systems   Constitutional:  Negative for fatigue and fever.   Respiratory:  Negative for shortness of breath.    Cardiovascular:  Negative for chest pain, palpitations and leg swelling.   Neurological:  Negative for syncope.   Psychiatric/Behavioral:  The patient is nervous/anxious.       Past History:  Medical History: has a past medical history of Anxiety, Asthma, Eczema of both hands, History of gestational diabetes mellitus, Kidney stone (01/2018), and Nexplanon in place.   Surgical History: has no past surgical history on file.   Family History: family history includes Breast cancer in her paternal grandmother; Cervical cancer in her paternal grandmother; Diabetes in her paternal grandmother; Stroke in her father; Thyroid disease in her mother; Uterine cancer in her paternal grandmother.   Social History: reports that she has never smoked. She has never used smokeless tobacco. She reports that she does not drink alcohol and does not use drugs.       Allergies: Corticosteroids and Prednisone    Physical Exam  Constitutional:       Appearance: Normal appearance.   HENT:      Head: Normocephalic.   Eyes:      Conjunctiva/sclera: Conjunctivae normal.      Pupils: Pupils are equal, round, and reactive to light.   Cardiovascular:      Rate and Rhythm: Normal rate and regular rhythm.      Heart sounds: Normal heart sounds.   Pulmonary:      Effort: Pulmonary effort is normal.      Breath sounds: Normal breath sounds.   Abdominal:      Tenderness: There is no abdominal tenderness.   Musculoskeletal:         General: Normal range of motion.   Skin:     General: Skin is warm and dry.      Capillary Refill: Capillary refill takes less than 2 seconds.   Neurological:      General: No focal deficit present.      Mental Status: She is alert and oriented to person, place, and time.   Psychiatric:         Mood and Affect: Mood normal.         Behavior: Behavior normal.          Thought Content: Thought content normal.         Judgment: Judgment normal.        Result Review :                   Assessment and Plan    Diagnoses and all orders for this visit:    1. Routine medical exam (Primary)  Comments:  We discussed diet, exercise, and prev counseling. Pt sees GYN for pap. Return for labs.  Orders:  -     CBC & Differential  -     Comprehensive Metabolic Panel  -     TSH    2. Generalized anxiety disorder  Comments:  Try Lexapro. Continue Hydroxyzine HS. Continue Propranolol PRN acute anxiety.  Orders:  -     propranolol (INDERAL) 20 MG tablet; Take 1 tablet by mouth 3 (Three) Times a Day.  Dispense: 90 tablet; Refill: 3  -     hydrOXYzine (ATARAX) 25 MG tablet; Take 1 tablet by mouth Every 8 (Eight) Hours As Needed for Anxiety. for anxiety  Dispense: 90 tablet; Refill: 1  -     escitalopram (Lexapro) 10 MG tablet; Take 1 tablet by mouth Daily.  Dispense: 90 tablet; Refill: 3    3. Primary insomnia  Comments:  Trazodone as needed for insomnia.  Orders:  -     traZODone (DESYREL) 50 MG tablet; TAKE 1-2 TABLETS BY MOUTH EVERY NIGHT AT BEDTIME AS NEEDED FOR INSOMNIA.  Dispense: 180 tablet; Refill: 3    4. Screening for diabetes mellitus  -     Hemoglobin A1c    5. Screening for lipid disorders  -     Lipid Panel    6. Vitamin D deficiency  -     Vitamin D,25-Hydroxy    7. Vitamin deficiency  -     Vitamin B12  -     Folate                Current Outpatient Medications:     Etonogestrel (Nexplanon) 68 MG implant subdermal implant, Inject 1 each into the appropriate area of the skin as directed by provider 1 (One) Time., Disp: , Rfl:     hydrOXYzine (ATARAX) 25 MG tablet, Take 1 tablet by mouth Every 8 (Eight) Hours As Needed for Anxiety. for anxiety, Disp: 90 tablet, Rfl: 1    loratadine (CLARITIN) 10 MG tablet, Take 1 tablet by mouth Daily., Disp: , Rfl:     propranolol (INDERAL) 20 MG tablet, Take 1 tablet by mouth 3 (Three) Times a Day., Disp: 90 tablet, Rfl: 3    traZODone (DESYREL)  50 MG tablet, TAKE 1-2 TABLETS BY MOUTH EVERY NIGHT AT BEDTIME AS NEEDED FOR INSOMNIA., Disp: 180 tablet, Rfl: 3    escitalopram (Lexapro) 10 MG tablet, Take 1 tablet by mouth Daily., Disp: 90 tablet, Rfl: 3    Follow Up   Return in about 1 month (around 11/4/2023) for if not improving or sooner if symptoms worsen.  Patient was given instructions and counseling regarding her condition or for health maintenance advice. Please see specific information pulled into the AVS if appropriate.     Catherine Johnson, APRN

## 2023-10-09 RX ORDER — PAROXETINE HYDROCHLORIDE 40 MG/1
40 TABLET, FILM COATED ORAL EVERY MORNING
Qty: 30 TABLET | OUTPATIENT
Start: 2023-10-09

## 2023-10-10 ENCOUNTER — LAB (OUTPATIENT)
Dept: FAMILY MEDICINE CLINIC | Facility: CLINIC | Age: 28
End: 2023-10-10
Payer: COMMERCIAL

## 2023-10-10 DIAGNOSIS — F51.01 PRIMARY INSOMNIA: ICD-10-CM

## 2023-10-11 LAB
25(OH)D3+25(OH)D2 SERPL-MCNC: 30.8 NG/ML (ref 30–100)
ALBUMIN SERPL-MCNC: 4.4 G/DL (ref 4–5)
ALBUMIN/GLOB SERPL: 1.7 {RATIO} (ref 1.2–2.2)
ALP SERPL-CCNC: 69 IU/L (ref 44–121)
ALT SERPL-CCNC: 68 IU/L (ref 0–32)
AST SERPL-CCNC: 61 IU/L (ref 0–40)
BASOPHILS # BLD AUTO: 0 X10E3/UL (ref 0–0.2)
BASOPHILS NFR BLD AUTO: 0 %
BILIRUB SERPL-MCNC: 0.3 MG/DL (ref 0–1.2)
BUN SERPL-MCNC: 10 MG/DL (ref 6–20)
BUN/CREAT SERPL: 13 (ref 9–23)
CALCIUM SERPL-MCNC: 9 MG/DL (ref 8.7–10.2)
CHLORIDE SERPL-SCNC: 102 MMOL/L (ref 96–106)
CHOLEST SERPL-MCNC: 227 MG/DL (ref 100–199)
CO2 SERPL-SCNC: 21 MMOL/L (ref 20–29)
CREAT SERPL-MCNC: 0.77 MG/DL (ref 0.57–1)
EGFRCR SERPLBLD CKD-EPI 2021: 106 ML/MIN/1.73
EOSINOPHIL # BLD AUTO: 0.1 X10E3/UL (ref 0–0.4)
EOSINOPHIL NFR BLD AUTO: 3 %
ERYTHROCYTE [DISTWIDTH] IN BLOOD BY AUTOMATED COUNT: 12.1 % (ref 11.7–15.4)
FOLATE SERPL-MCNC: 6.8 NG/ML
GLOBULIN SER CALC-MCNC: 2.6 G/DL (ref 1.5–4.5)
GLUCOSE SERPL-MCNC: 84 MG/DL (ref 70–99)
HBA1C MFR BLD: 5.4 % (ref 4.8–5.6)
HCT VFR BLD AUTO: 39.8 % (ref 34–46.6)
HDLC SERPL-MCNC: 80 MG/DL
HGB BLD-MCNC: 13.4 G/DL (ref 11.1–15.9)
IMM GRANULOCYTES # BLD AUTO: 0 X10E3/UL (ref 0–0.1)
IMM GRANULOCYTES NFR BLD AUTO: 0 %
LDLC SERPL CALC-MCNC: 122 MG/DL (ref 0–99)
LYMPHOCYTES # BLD AUTO: 1.7 X10E3/UL (ref 0.7–3.1)
LYMPHOCYTES NFR BLD AUTO: 38 %
MCH RBC QN AUTO: 32.1 PG (ref 26.6–33)
MCHC RBC AUTO-ENTMCNC: 33.7 G/DL (ref 31.5–35.7)
MCV RBC AUTO: 95 FL (ref 79–97)
MONOCYTES # BLD AUTO: 0.3 X10E3/UL (ref 0.1–0.9)
MONOCYTES NFR BLD AUTO: 7 %
NEUTROPHILS # BLD AUTO: 2.3 X10E3/UL (ref 1.4–7)
NEUTROPHILS NFR BLD AUTO: 52 %
PLATELET # BLD AUTO: 194 X10E3/UL (ref 150–450)
POTASSIUM SERPL-SCNC: 4.1 MMOL/L (ref 3.5–5.2)
PROT SERPL-MCNC: 7 G/DL (ref 6–8.5)
RBC # BLD AUTO: 4.18 X10E6/UL (ref 3.77–5.28)
SODIUM SERPL-SCNC: 138 MMOL/L (ref 134–144)
TRIGL SERPL-MCNC: 146 MG/DL (ref 0–149)
TSH SERPL DL<=0.005 MIU/L-ACNC: 0.94 UIU/ML (ref 0.45–4.5)
VIT B12 SERPL-MCNC: 884 PG/ML (ref 232–1245)
VLDLC SERPL CALC-MCNC: 25 MG/DL (ref 5–40)
WBC # BLD AUTO: 4.5 X10E3/UL (ref 3.4–10.8)

## 2023-10-13 RX ORDER — PAROXETINE HYDROCHLORIDE 40 MG/1
40 TABLET, FILM COATED ORAL EVERY MORNING
Qty: 30 TABLET | OUTPATIENT
Start: 2023-10-13

## 2023-10-13 RX ORDER — TRAZODONE HYDROCHLORIDE 50 MG/1
TABLET ORAL
Qty: 60 TABLET | OUTPATIENT
Start: 2023-10-13

## 2023-11-22 ENCOUNTER — OFFICE VISIT (OUTPATIENT)
Dept: FAMILY MEDICINE CLINIC | Facility: CLINIC | Age: 28
End: 2023-11-22
Payer: COMMERCIAL

## 2023-11-22 ENCOUNTER — TELEPHONE (OUTPATIENT)
Dept: FAMILY MEDICINE CLINIC | Facility: CLINIC | Age: 28
End: 2023-11-22
Payer: COMMERCIAL

## 2023-11-22 VITALS
HEART RATE: 114 BPM | OXYGEN SATURATION: 98 % | BODY MASS INDEX: 33.12 KG/M2 | SYSTOLIC BLOOD PRESSURE: 148 MMHG | HEIGHT: 64 IN | WEIGHT: 194 LBS | DIASTOLIC BLOOD PRESSURE: 80 MMHG

## 2023-11-22 DIAGNOSIS — S01.01XA LACERATION OF SCALP, INITIAL ENCOUNTER: ICD-10-CM

## 2023-11-22 DIAGNOSIS — R05.1 ACUTE COUGH: Primary | ICD-10-CM

## 2023-11-22 RX ORDER — DEXTROMETHORPHAN HYDROBROMIDE AND PROMETHAZINE HYDROCHLORIDE 15; 6.25 MG/5ML; MG/5ML
5 SYRUP ORAL 4 TIMES DAILY PRN
Qty: 120 ML | Refills: 0 | Status: SHIPPED | OUTPATIENT
Start: 2023-11-22

## 2023-11-22 RX ORDER — AZITHROMYCIN 250 MG/1
TABLET, FILM COATED ORAL
Qty: 6 TABLET | Refills: 0 | Status: SHIPPED | OUTPATIENT
Start: 2023-11-22

## 2023-11-22 NOTE — PROGRESS NOTES
Follow Up Office Visit      Date of Visit:  2023   Patient Name: Salina De La Fuente  : 1995   MRN: [id number]     Chief Complaint:    Chief Complaint   Patient presents with    Suture / Staple Removal    Cough       History of Present Illness: Salina De La Fuente is a 28 y.o. female who is here today for follow up.  Patient comes in today to have some sutures removed from her scalp.  They have been in for about 10 to 14 days.  Overall wound looks fine now.  Patient also with cough and chest congestion.  Present for 1 week.        Subjective      Review of Systems:   Review of Systems   Constitutional:  Negative for fatigue and fever.   HENT:  Positive for rhinorrhea. Negative for congestion and ear pain.    Respiratory:  Positive for cough. Negative for apnea, chest tightness and shortness of breath.    Cardiovascular:  Negative for chest pain.   Gastrointestinal:  Negative for abdominal pain, constipation, diarrhea and nausea.   Musculoskeletal:  Negative for arthralgias.   Psychiatric/Behavioral:  Negative for depressed mood and stress.        Past Medical History:   Past Medical History:   Diagnosis Date    Anxiety     as a teenager    Asthma     never actually diagnosed    Eczema of both hands     History of gestational diabetes mellitus     2016    Kidney stone 2018    Nexplanon in place        Past Surgical History: No past surgical history on file.    Family History:   Family History   Problem Relation Age of Onset    Stroke Father     Thyroid disease Mother     Breast cancer Paternal Grandmother     Cervical cancer Paternal Grandmother     Diabetes Paternal Grandmother     Uterine cancer Paternal Grandmother     Ovarian cancer Neg Hx     Colon cancer Neg Hx        Social History:   Social History     Socioeconomic History    Marital status:    Tobacco Use    Smoking status: Never    Smokeless tobacco: Never   Substance and Sexual Activity    Alcohol use: No    Drug use: No    Sexual activity:  "Yes     Partners: Male     Birth control/protection: Nexplanon       Medications:     Current Outpatient Medications:     azithromycin (Zithromax) 250 MG tablet, 2 po today and then 1 po qd, Disp: 6 tablet, Rfl: 0    escitalopram (Lexapro) 10 MG tablet, Take 1 tablet by mouth Daily., Disp: 90 tablet, Rfl: 3    Etonogestrel (Nexplanon) 68 MG implant subdermal implant, Inject 1 each into the appropriate area of the skin as directed by provider 1 (One) Time., Disp: , Rfl:     hydrOXYzine (ATARAX) 25 MG tablet, Take 1 tablet by mouth Every 8 (Eight) Hours As Needed for Anxiety. for anxiety, Disp: 90 tablet, Rfl: 1    loratadine (CLARITIN) 10 MG tablet, Take 1 tablet by mouth Daily., Disp: , Rfl:     promethazine-dextromethorphan (PROMETHAZINE-DM) 6.25-15 MG/5ML syrup, Take 5 mL by mouth 4 (Four) Times a Day As Needed for Cough., Disp: 120 mL, Rfl: 0    propranolol (INDERAL) 20 MG tablet, Take 1 tablet by mouth 3 (Three) Times a Day., Disp: 90 tablet, Rfl: 3    traZODone (DESYREL) 50 MG tablet, TAKE 1-2 TABLETS BY MOUTH EVERY NIGHT AT BEDTIME AS NEEDED FOR INSOMNIA., Disp: 180 tablet, Rfl: 3    Allergies:   Allergies   Allergen Reactions    Corticosteroids Hives     With liquid version, has safely taken pill form    Prednisone Hives     Liquid prednisone       Objective     Physical Exam:  Vital Signs:   Vitals:    11/22/23 0945   BP: 148/80   Pulse: 114   SpO2: 98%   Weight: 88 kg (194 lb)   Height: 162.6 cm (64\")     Body mass index is 33.3 kg/m².     Physical Exam  Vitals and nursing note reviewed.   Constitutional:       General: She is not in acute distress.     Appearance: Normal appearance. She is not ill-appearing.   HENT:      Head: Normocephalic and atraumatic.      Right Ear: Tympanic membrane and ear canal normal.      Left Ear: Tympanic membrane and ear canal normal.      Nose: Nose normal.   Cardiovascular:      Rate and Rhythm: Normal rate and regular rhythm.      Heart sounds: Normal heart sounds. "   Pulmonary:      Effort: Pulmonary effort is normal.      Breath sounds: Normal breath sounds.   Neurological:      Mental Status: She is alert and oriented to person, place, and time. Mental status is at baseline.   Psychiatric:         Mood and Affect: Mood normal.         Procedures      Assessment / Plan      Assessment/Plan:   Diagnoses and all orders for this visit:    1. Acute cough (Primary)    2. Laceration of scalp, initial encounter    Other orders  -     promethazine-dextromethorphan (PROMETHAZINE-DM) 6.25-15 MG/5ML syrup; Take 5 mL by mouth 4 (Four) Times a Day As Needed for Cough.  Dispense: 120 mL; Refill: 0  -     azithromycin (Zithromax) 250 MG tablet; 2 po today and then 1 po qd  Dispense: 6 tablet; Refill: 0         Treated cough with antibiotic and cough medication.  Sutures removed.  Wound clean dry and intact.    Follow Up:   No follow-ups on file.    Angelito Victoria  [facility] [place]

## 2023-11-22 NOTE — TELEPHONE ENCOUNTER
Caller: Salina De La Fuente    Relationship: Self    Best call back number: [phone]     What medication are you requesting: MEDICATION FOR A YEAST INFECTION        Have you had these symptoms before:    [x] Yes  [] No    Have you been treated for these symptoms before:   [x] Yes  [] No    If a prescription is needed, what is your preferred pharmacy and phone number: [facility] [id number] [place] [address] - [phone] [facility] [phone] FX     Additional notes:PATIENT WAS SEEN IN OFFICE TODAY WITH ROD AND FORGOT TO ASK FOR MEDICATION FOR YEAST INFECTION. SHE WAS PRESCRIBED AN ANTIBIOTIC AND USUALLY GETS ONE WHEN TAKING AN ANTIBIOTIC

## 2023-11-23 RX ORDER — FLUCONAZOLE 150 MG/1
150 TABLET ORAL ONCE
Qty: 1 TABLET | Refills: 0 | Status: SHIPPED | OUTPATIENT
Start: 2023-11-23 | End: 2023-11-23

## 2023-11-27 ENCOUNTER — TELEPHONE (OUTPATIENT)
Dept: FAMILY MEDICINE CLINIC | Facility: CLINIC | Age: 28
End: 2023-11-27
Payer: COMMERCIAL

## 2023-11-27 NOTE — TELEPHONE ENCOUNTER
Caller: Salina De La Fuente    Relationship: Self    Best call back number: [phone]     What is the best time to reach you: ANYTIME    Who are you requesting to speak with (clinical staff, provider,  specific staff member): PCP/MA        What was the call regarding: PATIENT WAS SEEN IN OFFICE ON WED 11/22/23 BY ROD. SHE WAS PUT ON MEDICATION AND SHE WAS FEELING SOME BETTER BT NOW IS WORSE . ALL HER CHILDREN HAVE NOW TESTED POSITIVE FOR STREP. SHE IS WANTING TO SEE IF SHE CAN BE GIVEN ANOTHER ANTIBIOTIC AND COUGH SYRUP. AT THE CLINIC FOR THE CHILDREN THEY STATED AMOXICILLIN WILL NOT WORK FOR THIS STREP AND THEY WERE CALLED IN CEFDINIR. PATIENT ALSO STATED WHEN SHE IS ON ANTIBIOTIC SHE GETS A YEAST INFECTION AND WOULD LIKE SOME MEDICATION FOR THAT AS WELL.CALL PATIENT TO DISCUSS    Is it okay if the provider responds through MyChart: CALLBACK TO ADVISE IF SOMETHING WILL BE CALLED IN OR SHE WILL NEED TO COME BACK IN

## 2023-11-29 RX ORDER — FLUCONAZOLE 150 MG/1
150 TABLET ORAL ONCE
Qty: 1 TABLET | Refills: 0 | Status: SHIPPED | OUTPATIENT
Start: 2023-11-29 | End: 2023-11-29

## 2023-11-29 RX ORDER — DEXTROMETHORPHAN HYDROBROMIDE AND PROMETHAZINE HYDROCHLORIDE 15; 6.25 MG/5ML; MG/5ML
5 SYRUP ORAL 4 TIMES DAILY PRN
Qty: 120 ML | Refills: 0 | Status: SHIPPED | OUTPATIENT
Start: 2023-11-29

## 2023-11-29 RX ORDER — CEPHALEXIN 500 MG/1
500 CAPSULE ORAL 3 TIMES DAILY
Qty: 30 CAPSULE | Refills: 0 | Status: SHIPPED | OUTPATIENT
Start: 2023-11-29

## 2024-09-26 DIAGNOSIS — F41.1 GENERALIZED ANXIETY DISORDER: ICD-10-CM

## 2024-09-26 RX ORDER — ESCITALOPRAM OXALATE 10 MG/1
10 TABLET ORAL DAILY
Qty: 30 TABLET | Refills: 0 | Status: SHIPPED | OUTPATIENT
Start: 2024-09-26

## 2024-09-26 RX ORDER — PROPRANOLOL HCL 20 MG
20 TABLET ORAL 3 TIMES DAILY
Qty: 90 TABLET | Refills: 0 | Status: SHIPPED | OUTPATIENT
Start: 2024-09-26

## 2024-09-26 NOTE — TELEPHONE ENCOUNTER
Pt is due for annual physical on or after 10/5/23. Will need to est care with new PCP. Please call and schedule pt appt with new PCP.

## 2024-09-26 NOTE — TELEPHONE ENCOUNTER
Hub relay: left message patient's medication has been refilled and they need to establish with another doctor and schedule their physical

## 2024-10-26 DIAGNOSIS — F51.01 PRIMARY INSOMNIA: ICD-10-CM

## 2024-10-28 RX ORDER — TRAZODONE HYDROCHLORIDE 50 MG/1
TABLET, FILM COATED ORAL
Qty: 180 TABLET | Refills: 3 | OUTPATIENT
Start: 2024-10-28

## 2024-11-06 DIAGNOSIS — F41.1 GENERALIZED ANXIETY DISORDER: ICD-10-CM

## 2024-11-06 RX ORDER — ESCITALOPRAM OXALATE 10 MG/1
10 TABLET ORAL DAILY
Qty: 30 TABLET | Refills: 0 | OUTPATIENT
Start: 2024-11-06

## 2024-11-06 NOTE — TELEPHONE ENCOUNTER
Pt needs appt for med recheck and annual physical, will need to est with new PCP, please call and schedule pt with PCP.

## 2024-11-07 NOTE — TELEPHONE ENCOUNTER
Hub relay: left message patient needs to establish with a new provider and be seen for their physical for further refills

## 2024-11-09 DIAGNOSIS — F41.1 GENERALIZED ANXIETY DISORDER: ICD-10-CM

## 2025-01-03 ENCOUNTER — OFFICE VISIT (OUTPATIENT)
Dept: FAMILY MEDICINE CLINIC | Facility: CLINIC | Age: 30
End: 2025-01-03
Payer: COMMERCIAL

## 2025-01-03 VITALS
SYSTOLIC BLOOD PRESSURE: 142 MMHG | HEIGHT: 64 IN | BODY MASS INDEX: 30.56 KG/M2 | WEIGHT: 179 LBS | DIASTOLIC BLOOD PRESSURE: 90 MMHG | HEART RATE: 84 BPM | OXYGEN SATURATION: 98 %

## 2025-01-03 DIAGNOSIS — R74.8 ELEVATED LIVER ENZYMES: ICD-10-CM

## 2025-01-03 DIAGNOSIS — F41.1 GENERALIZED ANXIETY DISORDER: ICD-10-CM

## 2025-01-03 DIAGNOSIS — R94.6 ABNORMAL THYROID FUNCTION TEST: ICD-10-CM

## 2025-01-03 DIAGNOSIS — F51.01 PRIMARY INSOMNIA: ICD-10-CM

## 2025-01-03 DIAGNOSIS — F10.90 ALCOHOL USE DISORDER: Primary | ICD-10-CM

## 2025-01-03 PROCEDURE — 99214 OFFICE O/P EST MOD 30 MIN: CPT | Performed by: INTERNAL MEDICINE

## 2025-01-03 PROCEDURE — 1159F MED LIST DOCD IN RCRD: CPT | Performed by: INTERNAL MEDICINE

## 2025-01-03 PROCEDURE — 1160F RVW MEDS BY RX/DR IN RCRD: CPT | Performed by: INTERNAL MEDICINE

## 2025-01-03 RX ORDER — GAUZE BANDAGE 2" X 2"
100 BANDAGE TOPICAL DAILY
COMMUNITY
Start: 2024-12-28 | End: 2025-01-03

## 2025-01-03 RX ORDER — PANTOPRAZOLE SODIUM 40 MG/1
40 TABLET, DELAYED RELEASE ORAL DAILY
Qty: 30 TABLET | Refills: 3 | Status: SHIPPED | OUTPATIENT
Start: 2025-01-03

## 2025-01-03 RX ORDER — LANOLIN ALCOHOL/MO/W.PET/CERES
100 CREAM (GRAM) TOPICAL DAILY
COMMUNITY
Start: 2024-12-28

## 2025-01-03 RX ORDER — PROPRANOLOL HCL 20 MG
20 TABLET ORAL 3 TIMES DAILY
Qty: 90 TABLET | Refills: 3 | Status: SHIPPED | OUTPATIENT
Start: 2025-01-03

## 2025-01-03 RX ORDER — PROPRANOLOL HCL 20 MG
20 TABLET ORAL 3 TIMES DAILY
Qty: 90 TABLET | Refills: 0 | OUTPATIENT
Start: 2025-01-03

## 2025-01-03 RX ORDER — TRAZODONE HYDROCHLORIDE 50 MG/1
50 TABLET, FILM COATED ORAL NIGHTLY
Qty: 30 TABLET | Refills: 3 | Status: SHIPPED | OUTPATIENT
Start: 2025-01-03

## 2025-01-03 RX ORDER — PANTOPRAZOLE SODIUM 40 MG/1
40 TABLET, DELAYED RELEASE ORAL DAILY
COMMUNITY
Start: 2024-12-28 | End: 2025-01-03 | Stop reason: SDUPTHER

## 2025-01-03 NOTE — PROGRESS NOTES
Office Note     Name: Salina De La Fuente    : 1995     MRN: [id number]     Chief Complaint  Establish Care (Patient presents today for established. Went to Er for dysphagia. States also they found out she had a heart attack.)    Subjective     History of Present Illness:  Salina De La Fuente is a 29 y.o. female who presents today for:    History of Present Illness  The patient presents for evaluation of alcohol use disorder, esophageal tear, insomnia, tachycardia, and elevated liver enzymes.    She has been under the care of Dr. Kyle Rosenstein, an endocrinologist, for thyroid-related issues. She missed her scheduled appointment in 2024, during which she was supposed to undergo a thyroid ultrasound due to transportation issues. She has not yet rescheduled this appointment.    She has had several recent ER visits due to bleeding episodes. She was hospitalized for 2 days in 10/2024 for alcohol detoxification, during which a small esophageal tear was identified as the cause of her hematemesis. She reported a single episode of bright red blood vomiting, estimating the volume to be approximately 8 ounces. Since then, she has not experienced any further episodes of vomiting blood. She successfully completed another detoxification process at home with the aid of Valium, despite experiencing severe withdrawal symptoms. She has remained abstinent from alcohol since her last hospital admission. She reported that her last drink was consumed upon returning home from the hospital, prior to starting Valium. She had been consuming half a day prior to her hospitalization. She and her  both ceased alcohol consumption in 10/2024 but relapsed in 2024 due to financial stressors. They resumed drinking for a month until the past weekend when they both quit again. She sought medical attention at the  ER on Friday due to dysphagia, which had persisted for 3 days. She described a burning sensation, pain, and difficulty  swallowing, along with hoarseness. She also reported chest and abdominal pain, which made it difficult to consume even water. She experienced a range of symptoms including neck pain, chest pain, a hard lump in her breast, headache, arm numbness, hand swelling, tremors, sweating, chills, dyspnea, and rapid breathing. She was unable to read name tags due to visual disturbances. She had consumed alcohol that day and did not believe her symptoms were related to withdrawal. She suspected a combination of alcohol consumption and esophageal issues. She was discharged from the hospital with prescriptions for pantoprazole, vitamin B1, and folic acid. She has not been attending AA meetings.    She has been experiencing tachycardia, which has been ongoing for some time. She can visually perceive her heartbeat in her eyes when at rest. She also experiences shortness of breath and rapid breathing. She was previously on propranolol but discontinued it due to concerns about mixing it with Valium. She has not consulted a cardiologist for her tachycardia. She requested another EKG for reassurance. She reported that her heart rate was consistently around 160 bpm during her hospital stay in 10/2024. She has not noticed any leg swelling. She had severe abdominal pain and chest tenderness, but these symptoms have since improved.    She has been experiencing insomnia since her hospital discharge. She was prescribed trazodone for sleep but found it to be ineffective in the long term. She has not been taking her allergy medication.    She has an upcoming appointment with a gastroenterologist at the end of 01/2025. She has been experiencing elevated liver enzymes, which she attributes to her recent resumption of alcohol consumption. She reported that her liver enzymes were almost normal within 2 weeks of her discharge from the hospital in 10/2024.    Supplemental Information  She had an eye hemorrhage in the beginning of 12/2024.    SOCIAL  HISTORY  The patient reports no current alcohol consumption and mentions that both she and her  quit drinking in October.    MEDICATIONS  Current: Valium, pantoprazole, vitamin B1, folic acid, propranolol, trazodone  Discontinued: Lexapro (escitalopram)      Review of Systems:   Review of Systems    Past Medical History:   Past Medical History:   Diagnosis Date    Anxiety     as a teenager    Asthma     never actually diagnosed    Eczema of both hands     History of gestational diabetes mellitus     2016    Kidney stone 01/2018    Nexplanon in place        Past Surgical History: History reviewed. No pertinent surgical history.    Family History:   Family History   Problem Relation Age of Onset    Stroke Father     Thyroid disease Mother     Breast cancer Paternal Grandmother     Cervical cancer Paternal Grandmother     Diabetes Paternal Grandmother     Uterine cancer Paternal Grandmother     Ovarian cancer Neg Hx     Colon cancer Neg Hx        Social History:   Social History     Socioeconomic History    Marital status:    Tobacco Use    Smoking status: Never    Smokeless tobacco: Never   Vaping Use    Vaping status: Never Used   Substance and Sexual Activity    Alcohol use: No    Drug use: No    Sexual activity: Yes     Partners: Male     Birth control/protection: Nexplanon       Immunizations:   Immunization History   Administered Date(s) Administered    Influenza Seasonal Injectable 11/17/2011    Tdap 12/26/2018        Medications:     Current Outpatient Medications:     Etonogestrel (Nexplanon) 68 MG implant subdermal implant, To be inserted one time by prescriber. Route Subdermal., Disp: , Rfl:     hydrOXYzine (ATARAX) 25 MG tablet, Take 1 tablet by mouth Every 8 (Eight) Hours As Needed for Anxiety. for anxiety, Disp: 90 tablet, Rfl: 1    loratadine (CLARITIN) 10 MG tablet, Take 1 tablet by mouth Daily., Disp: , Rfl:     pantoprazole (PROTONIX) 40 MG EC tablet, Take 1 tablet by mouth Daily., Disp:  "30 tablet, Rfl: 3    propranolol (INDERAL) 20 MG tablet, Take 1 tablet by mouth 3 (Three) Times a Day., Disp: 90 tablet, Rfl: 3    thiamine (VITAMIN B1) 100 MG tablet, Take 1 tablet by mouth Daily., Disp: , Rfl:     traZODone (DESYREL) 50 MG tablet, Take 1 tablet by mouth Every Night. TAKE 1-2 TABLETS BY MOUTH EVERY NIGHT AT BEDTIME AS NEEDED FOR INSOMNIA., Disp: 30 tablet, Rfl: 3    Allergies:   Allergies   Allergen Reactions    Corticosteroids Hives     With liquid version, has safely taken pill form    Prednisone Hives     Liquid prednisone       Objective     Vital Signs  /90   Pulse 84   Ht 162.6 cm (64\")   Wt 81.2 kg (179 lb)   SpO2 98%   BMI 30.73 kg/m²   Estimated body mass index is 30.73 kg/m² as calculated from the following:    Height as of this encounter: 162.6 cm (64\").    Weight as of this encounter: 81.2 kg (179 lb).    Vital Signs were reviewed.          Physical Exam  Vitals and nursing note reviewed.   Constitutional:       Appearance: Normal appearance.   Cardiovascular:      Rate and Rhythm: Normal rate and regular rhythm.      Heart sounds: No murmur heard.     No friction rub. No gallop.   Pulmonary:      Effort: Pulmonary effort is normal.      Breath sounds: Normal breath sounds. No wheezing, rhonchi or rales.   Neurological:      Mental Status: She is alert.        Physical Exam         Results  Laboratory Studies  Liver enzymes were elevated.    Testing  EKG showed possible anterior infarct.    Procedures     Assessment and Plan     Reviewed from [facility] date of service 10/25/2024 was reviewed and is summarized as follows: Patient is a 29-year-old female with history of anxiety and alcohol use disorder who presented with hematemesis and alcohol location.  She has a history of heavy alcohol use including half 1/5 of liquor daily.  Ultrasound was completed both concerning for fatty liver, AST and ALT were elevated, COWS score was elevated.  She was started on IV Rocephin " octreotide and Protonix.  She was started on phenobarbital for withdrawal as well as as needed Ativan.  She chose to be discharged home.  Naltrexone was not started due to history of liver damage.  She was advised to follow-up with a PCP here establish care however her previous PCP had changed practices and she had been advised by primary care office to schedule appointment but it looks like this was not done.      She saw [facility] discharge outreach [facility] on November 8 and note is summarized as follows: Advised them she would see her establish PCP.  However patient did not schedule follow-up.  She instead requested a refill and was advised to schedule follow-up but never did.  She returned to the ER on December 28 diagnosed with dysphagia and chest pain she was concerned about a Mavis-Nuñez tear but her x-rays were normal..  Was reportedly discharged with a diazepam taper at that time.    Diagnoses:    ICD-10-CM ICD-9-CM   1. Alcohol use disorder  F10.90 V49.89   2. Primary insomnia  F51.01 307.42   3. Generalized anxiety disorder  F41.1 300.02   4. Abnormal thyroid function test  R94.6 794.5   5. Elevated liver enzymes  R74.8 790.5       Plan:    1. Primary insomnia    - traZODone (DESYREL) 50 MG tablet; Take 1 tablet by mouth Every Night. TAKE 1-2 TABLETS BY MOUTH EVERY NIGHT AT BEDTIME AS NEEDED FOR INSOMNIA.  Dispense: 30 tablet; Refill: 3  - Ambulatory Referral to Behavioral Health    2. Generalized anxiety disorder    - propranolol (INDERAL) 20 MG tablet; Take 1 tablet by mouth 3 (Three) Times a Day.  Dispense: 90 tablet; Refill: 3  - Ambulatory Referral to Behavioral Health    3. Alcohol use disorder    - Ambulatory Referral to Behavioral Health    4. Abnormal thyroid function test    - TSH Rfx On Abnormal To Free T4    5. Elevated liver enzymes    - Comprehensive Metabolic Panel       Assessment & Plan  1. Alcohol Use Disorder.  She has a high risk of relapse. She was strongly advised to seek assistance from  a clinic that can provide maintenance therapy to manage cravings. Contact information for detoxification centers will be provided but patient declines further assistance at this time    2. Esophageal Tear.  She reported a previous esophageal tear causing hematemesis. No recent episodes of vomiting blood were noted. She was advised to follow up with her GI appointment at the end of this month.    3. Insomnia.  She reported severe insomnia since her recent hospital visit. She was previously on trazodone, which provided short-term relief. Further evaluation and potential adjustment of her sleep medication will be considered.    4. Tachycardia.  She has a history of tachycardia and reported a high heart rate during her recent ER visit. An EKG will be conducted today to assess her current cardiac status. If the EKG results are abnormal, a referral to a cardiologist will be considered.    5. Elevated Liver Enzymes.  Her liver enzymes were elevated, likely due to recent alcohol consumption. Repeat liver enzyme tests will be ordered to monitor her liver function.         Follow Up  Return in about 3 months (around 4/3/2025).    Patient was advised to call the office or seek medical care if  any issues discussed during this visit worsen or persist or if new concerns arise    Patient or patient representative verbalized consent for the use of Ambient Listening during the visit with  Abby Alexandra MD for chart documentation. 1/7/2025  10:55 EST    Abby Alexandra MD  [facility]  [address] [place] [id number] [phone]

## 2025-01-04 DIAGNOSIS — F41.1 GENERALIZED ANXIETY DISORDER: ICD-10-CM

## 2025-01-04 DIAGNOSIS — F51.01 PRIMARY INSOMNIA: ICD-10-CM

## 2025-01-04 LAB
ALBUMIN SERPL-MCNC: 4.4 G/DL (ref 3.9–4.9)
ALP SERPL-CCNC: 52 IU/L (ref 44–121)
ALT SERPL-CCNC: 152 IU/L (ref 0–32)
AST SERPL-CCNC: 90 IU/L (ref 0–40)
BILIRUB SERPL-MCNC: 0.4 MG/DL (ref 0–1.2)
BUN SERPL-MCNC: 6 MG/DL (ref 6–20)
BUN/CREAT SERPL: 8 (ref 9–23)
CALCIUM SERPL-MCNC: 9.3 MG/DL (ref 8.7–10.2)
CHLORIDE SERPL-SCNC: 102 MMOL/L (ref 96–106)
CO2 SERPL-SCNC: 23 MMOL/L (ref 20–29)
CREAT SERPL-MCNC: 0.72 MG/DL (ref 0.57–1)
EGFRCR SERPLBLD CKD-EPI 2021: 115 ML/MIN/1.73
GLOBULIN SER CALC-MCNC: 2.5 G/DL (ref 1.5–4.5)
GLUCOSE SERPL-MCNC: 115 MG/DL (ref 70–99)
POTASSIUM SERPL-SCNC: 3.2 MMOL/L (ref 3.5–5.2)
PROT SERPL-MCNC: 6.9 G/DL (ref 6–8.5)
SODIUM SERPL-SCNC: 139 MMOL/L (ref 134–144)
T4 FREE SERPL-MCNC: 1.56 NG/DL (ref 0.82–1.77)
TSH SERPL DL<=0.005 MIU/L-ACNC: 0.35 UIU/ML (ref 0.45–4.5)

## 2025-01-07 RX ORDER — ESCITALOPRAM OXALATE 10 MG/1
10 TABLET ORAL DAILY
Qty: 30 TABLET | Refills: 0 | OUTPATIENT
Start: 2025-01-07

## 2025-01-07 RX ORDER — TRAZODONE HYDROCHLORIDE 50 MG/1
TABLET, FILM COATED ORAL
Qty: 180 TABLET | OUTPATIENT
Start: 2025-01-07

## 2025-01-07 NOTE — PATIENT INSTRUCTIONS
Suicidal Feelings: How to Help Yourself  Suicide is when you end your own life. Suicidal ideation includes expressing thoughts about, or a preoccupation with, ending your own life. There are many things you can do to help yourself feel better when struggling with these feelings. Many services and people are available to support you and others who struggle with similar feelings.  If you ever feel like you may hurt yourself or others, or have thoughts about taking your own life, get help right away. To get help:  Go to your nearest emergency department.  Call your local emergency services (101 in the [place]  Call the [facility] and [facility] services helpline (211 in the [place]  Call or text a suicide hotline to speak with a trained counselor. The following suicide hotlines are available in the [place]:  0-444-079-TALK (1-605.924.6530 or 384 in the [place]  5-805-VQEYIJK (1-574.941.1002).  Text 882232. This is the Crisis Text Line in the [place]  1-852.228.5499. This is a hotline for [place] speakers.  1-579.480.4233. This is a hotline for TTY users.  9-664-8-U-FERNANDO (1-812.129.7268). This is a hotline for lesbian, george, bisexual, transgender, or questioning youth.  For a list of hotlines in [place], visit suicide.org/hotlines/international/jfxlbh-wobfeek-xhlnnxfh.html  Contact a crisis center or a local suicide prevention center. To find a crisis center or suicide prevention center:  Call your local hospital, clinic, community service organization, mental health center, social service provider, or health department. Ask for help with connecting to a crisis center.  For a list of crisis centers in the [place], visit: suicidepreventionlifeline.org  For a list of crisis centers in [place], visit: suicideprevention.ca  How to help yourself feel better    Promise yourself that you will not do anything bad or extreme when you have suicidal feelings. Remember the times you have felt hopeful.  Many people have  gotten through suicidal thoughts and feelings, and you can too.  If you have had these feelings before, remind yourself that you can get through them again.  Let family, friends, teachers, or counselors know how you are feeling. Do not separate yourself from those who care about you and want to help you.  Talk with someone every day, even if you do not feel like talking to anyone or being with other people.  Face-to-face conversation is best to help them understand your feelings.  Contact a mental health care provider and work with this person regularly.  Make a safety plan that you can follow during a crisis.  Include phone numbers of suicide prevention hotlines, mental health professionals, and trusted friends and family members you can call during an emergency.  Save these numbers on your phone.  If you are thinking of taking a lot of medicine, give your medicine to someone who can give it to you as prescribed.  If you are on antidepressants and are concerned you will overdose, tell your health care provider so that he or she can give you safer medicines.  Try to stick to your routines and follow a schedule every day. Make self-care a priority.  Make a list of realistic goals, and cross them off when you achieve them. Accomplishments can give you a sense of worth.  Wait until you are feeling better before doing things that you find difficult or unpleasant.  Do things that you have always enjoyed to take your mind off your feelings.  Try reading a book, or listening to or playing music.  Spending time outside, in nature, may help you feel better.  Follow these instructions at home:    Visit your primary health care provider every year for a physical and a mental health checkup.  Take over-the-counter and prescription medicines only as told by your health care provider.  Ask your health care provider about the possible side effects of any medicines you are taking.  Ask your health care provider about whether  suicidal ideation is a possible side effect of any of your medicines.  Learn about suicidal ideation and what increases the risk for the development of suicidal thoughts.  Eat a well-balanced diet, and eat regular meals.  Get plenty of rest.  Exercise if you are able. Just 30 minutes of exercise each day can help you feel better.  Keep your living space well lit.  Do not use alcohol or drugs. Remove these substances from your home.  General recommendations  Remove weapons, poisons, knives, and other deadly items from your home.  Work with a mental health care provider as needed.  When you are feeling well, write yourself a letter with tips and support that you can read when you are not feeling well.  Remember that life's difficulties can be sorted out with help. Conditions can be treated, and you can learn behaviors and ways of thinking that will help you.  Work with your health care provider or counselor to learn ways of coping with your thoughts and feelings.  Where to find more information  National Suicide Prevention Lifeline: www.suicidepreventionlifeline.org  Hopeline: www.hopeline.Urakkamaailma.fi  American Foundation for Suicide Prevention: www.afsp.org  The Anthony Project (for lesbian, george, bisexual, transgender, or questioning youth): www.thetrevorproject.org  National [place] of Mental Health: www.nimh.nih.gov/health/topics/suicide-prevention  Suicide Prevention Resources: afsp.org/suicide-prevention-resources  Contact a health care provider if:  You feel as though you are a burden to others.  You feel agitated, angry, vengeful, or have extreme mood swings.  You have withdrawn from family and friends.  You are frequently using drugs or alcohol.  Get help right away if:  You are talking about suicide or wishing to die.  You start making plans for how to commit suicide.  You feel that you have no reason to live.  You start making plans for putting your affairs in order, saying goodbye, or giving your possessions  away.  You feel guilt, shame, or unbearable pain, and it seems like there is no way out.  You are engaging in risky behaviors that could lead to death.  If you have any of these thoughts or symptoms, get help right away:  Go to your nearest emergency department or crisis center.  Call emergency services (911 in the [place]  Call or text a suicide crisis helpline.  Summary  Suicide is when you take your own life. Suicidal feelings are thoughts about ending your own life.  Promise yourself that you will not do anything bad or extreme when you have suicidal feelings.  Let family, friends, teachers, or counselors know how you are feeling.  Get help right away if you start making plans for how to commit suicide.  This information is not intended to replace advice given to you by your health care provider. Make sure you discuss any questions you have with your health care provider.  Document Revised: 07/14/2022 Document Reviewed: 04/28/2022  Elsevier Patient Education © 2024 [employer]

## 2025-01-16 ENCOUNTER — TELEPHONE (OUTPATIENT)
Dept: INTERNAL MEDICINE | Facility: CLINIC | Age: 30
End: 2025-01-16
Payer: COMMERCIAL

## 2025-01-16 NOTE — TELEPHONE ENCOUNTER
hub can relay message to pt called the pt to confirm appt tomorrow and to make sure the pt knows we are in the [facility] 1/16/25 tc

## 2025-01-17 ENCOUNTER — OFFICE VISIT (OUTPATIENT)
Dept: INTERNAL MEDICINE | Facility: CLINIC | Age: 30
End: 2025-01-17
Payer: COMMERCIAL

## 2025-01-17 VITALS
WEIGHT: 181.4 LBS | TEMPERATURE: 98.2 F | DIASTOLIC BLOOD PRESSURE: 88 MMHG | SYSTOLIC BLOOD PRESSURE: 122 MMHG | BODY MASS INDEX: 30.97 KG/M2 | HEART RATE: 80 BPM | OXYGEN SATURATION: 99 % | HEIGHT: 64 IN

## 2025-01-17 DIAGNOSIS — F41.1 GAD (GENERALIZED ANXIETY DISORDER): Primary | ICD-10-CM

## 2025-01-17 DIAGNOSIS — Z00.00 ANNUAL PHYSICAL EXAM: Primary | ICD-10-CM

## 2025-01-17 DIAGNOSIS — F41.1 GAD (GENERALIZED ANXIETY DISORDER): ICD-10-CM

## 2025-01-17 DIAGNOSIS — F10.90 ALCOHOL USE DISORDER: ICD-10-CM

## 2025-01-17 DIAGNOSIS — K21.9 GASTROESOPHAGEAL REFLUX DISEASE, UNSPECIFIED WHETHER ESOPHAGITIS PRESENT: ICD-10-CM

## 2025-01-17 PROCEDURE — 1160F RVW MEDS BY RX/DR IN RCRD: CPT | Performed by: PHYSICIAN ASSISTANT

## 2025-01-17 PROCEDURE — 1159F MED LIST DOCD IN RCRD: CPT | Performed by: PHYSICIAN ASSISTANT

## 2025-01-17 PROCEDURE — 2014F MENTAL STATUS ASSESS: CPT | Performed by: PHYSICIAN ASSISTANT

## 2025-01-17 PROCEDURE — 99395 PREV VISIT EST AGE 18-39: CPT | Performed by: PHYSICIAN ASSISTANT

## 2025-01-17 RX ORDER — PANTOPRAZOLE SODIUM 40 MG/1
40 TABLET, DELAYED RELEASE ORAL DAILY
Qty: 30 TABLET | Refills: 3 | Status: SHIPPED | OUTPATIENT
Start: 2025-01-17

## 2025-01-17 RX ORDER — SILVER SULFADIAZINE 10 MG/G
1 CREAM TOPICAL 2 TIMES DAILY
Qty: 400 G | Refills: 0 | Status: SHIPPED | OUTPATIENT
Start: 2025-01-17

## 2025-01-17 NOTE — PROGRESS NOTES
[facility] PRIMARY CARE  [address]  [facility] [address]  Dept: [phone]  Dept Fax: [phone]  Loc: [phone]  Loc Fax: [phone]    Salina De La Fuente  1995    New Patient Office Note    History of Present Illness:  Patient a 29-year-old female in today for annual physical.  Also here to establish care for alcohol use disorder.  Patient recently in the emergency room for vomiting blood.  Continues to struggle with anxiety.  Agreeable to GeneSight testing and referral to psychiatry at this point.  Needing refill on Protonix.    Anxiety   Symptoms include nervous/anxious behavior.  Patient reports no chest pain, confusion, dizziness, nausea, palpitations, shortness of breath or suicidal ideas.       The following portions of the patient's history were reviewed and updated as appropriate: allergies, current medications, past family history, past medical history, past social history, past surgical history, and problem list.    Medications:    Current Outpatient Medications:     Etonogestrel (Nexplanon) 68 MG implant subdermal implant, To be inserted one time by prescriber. Route Subdermal., Disp: , Rfl:     hydrOXYzine (ATARAX) 25 MG tablet, Take 1 tablet by mouth Every 8 (Eight) Hours As Needed for Anxiety. for anxiety, Disp: 90 tablet, Rfl: 1    loratadine (CLARITIN) 10 MG tablet, Take 1 tablet by mouth Daily., Disp: , Rfl:     pantoprazole (PROTONIX) 40 MG EC tablet, Take 1 tablet by mouth Daily., Disp: 30 tablet, Rfl: 3    propranolol (INDERAL) 20 MG tablet, Take 1 tablet by mouth 3 (Three) Times a Day., Disp: 90 tablet, Rfl: 3    thiamine (VITAMIN B1) 100 MG tablet, Take 1 tablet by mouth Daily., Disp: , Rfl:     traZODone (DESYREL) 50 MG tablet, Take 1 tablet by mouth Every Night. TAKE 1-2 TABLETS BY MOUTH EVERY NIGHT AT BEDTIME AS NEEDED FOR INSOMNIA., Disp: 30 tablet, Rfl: 3    Subjective  Allergies   Allergen Reactions    Corticosteroids Hives      With liquid version, has safely taken pill form    Prednisone Hives     Liquid prednisone        Past Medical History:   Diagnosis Date    Anxiety     as a teenager    Asthma     never actually diagnosed    Eczema of both hands     History of gestational diabetes mellitus     2016    Kidney stone 01/2018    Nexplanon in place        History reviewed. No pertinent surgical history.    Family History   Problem Relation Age of Onset    Stroke Father     Thyroid disease Mother     Breast cancer Paternal Grandmother     Cervical cancer Paternal Grandmother     Diabetes Paternal Grandmother     Uterine cancer Paternal Grandmother     Ovarian cancer Neg Hx     Colon cancer Neg Hx         Social History     Socioeconomic History    Marital status:    Tobacco Use    Smoking status: Never    Smokeless tobacco: Never   Vaping Use    Vaping status: Never Used   Substance and Sexual Activity    Alcohol use: No    Drug use: No    Sexual activity: Yes     Partners: Male     Birth control/protection: Nexplanon       Review of Systems   Constitutional:  Negative for activity change, chills, fatigue, fever and unexpected weight change.   HENT:  Negative for congestion, ear pain, postnasal drip, sinus pressure and sore throat.    Eyes:  Negative for pain, discharge and redness.   Respiratory:  Negative for cough, shortness of breath and wheezing.    Cardiovascular:  Negative for chest pain, palpitations and leg swelling.   Gastrointestinal:  Negative for diarrhea, nausea and vomiting.   Endocrine: Negative for cold intolerance and heat intolerance.   Genitourinary:  Negative for decreased urine volume and dysuria.   Musculoskeletal:  Negative for arthralgias and myalgias.   Skin:  Negative for rash and wound.   Neurological:  Negative for dizziness, light-headedness and headaches.   Hematological:  Does not bruise/bleed easily.   Psychiatric/Behavioral:  Negative for confusion, dysphoric mood, self-injury, sleep disturbance and  "suicidal ideas. The patient is nervous/anxious.        Objective  Vitals:    01/17/25 1329 01/17/25 1445   BP: 136/90 122/88   BP Location: Left arm    Patient Position: Sitting    Cuff Size: Large Adult    Pulse: 80    Temp: 98.2 °F (36.8 °C)    TempSrc: Temporal    SpO2: 99%    Weight: 82.3 kg (181 lb 6.4 oz)    Height: 162.6 cm (64.02\")        Physical Exam  Physical Exam  Vitals and nursing note reviewed.   Constitutional:       General: She is not in acute distress.     Appearance: She is not ill-appearing.   HENT:      Head: Normocephalic.      Right Ear: Tympanic membrane, ear canal and external ear normal. There is no impacted cerumen.      Left Ear: Tympanic membrane, ear canal and external ear normal. There is no impacted cerumen.      Nose: No congestion or rhinorrhea.      Mouth/Throat:      Mouth: Mucous membranes are moist.      Pharynx: Oropharynx is clear. No oropharyngeal exudate or posterior oropharyngeal erythema.   Eyes:      General:         Right eye: No discharge.         Left eye: No discharge.      Extraocular Movements: Extraocular movements intact.      Conjunctiva/sclera: Conjunctivae normal.      Pupils: Pupils are equal, round, and reactive to light.   Cardiovascular:      Rate and Rhythm: Normal rate and regular rhythm.      Heart sounds: Normal heart sounds. No murmur heard.     No friction rub. No gallop.   Pulmonary:      Effort: Pulmonary effort is normal. No respiratory distress.      Breath sounds: Normal breath sounds. No wheezing.   Abdominal:      General: Bowel sounds are normal. There is no distension.      Palpations: Abdomen is soft. There is no mass.      Tenderness: There is no abdominal tenderness.   Musculoskeletal:         General: No swelling. Normal range of motion.      Cervical back: Normal range of motion. No tenderness.      Right lower leg: No edema.      Left lower leg: No edema.   Lymphadenopathy:      Cervical: No cervical adenopathy.   Skin:     Findings: " No bruising, erythema or rash.   Neurological:      Mental Status: She is oriented to person, place, and time.      Gait: Gait normal.   Psychiatric:         Mood and Affect: Mood normal.         Behavior: Behavior normal.         Thought Content: Thought content normal.         Judgment: Judgment normal.         Diagnostic Data  Procedures    Assessment  Diagnoses and all orders for this visit:    1. Annual physical exam (Primary)  -     Vitamin B12 & Folate  -     Vitamin D,25-Hydroxy; Future  -     Lipid Panel  -     Hemoglobin A1c; Future  -     TSH Rfx On Abnormal To Free T4  -     Comprehensive Metabolic Panel  -     CBC (No Diff)    2. JOSE RAMON (generalized anxiety disorder)  -     Cancel: Ambulatory Referral to Psychiatry  -     Ambulatory Referral to Psychiatry  -     Ambulatory Referral to Psychology  -     GeneSight - Swab,; Future    3. Gastroesophageal reflux disease, unspecified whether esophagitis present    4. Alcohol use disorder  -     Ambulatory Referral to Psychiatry  -     Ambulatory Referral to Psychology    Other orders  -     pantoprazole (PROTONIX) 40 MG EC tablet; Take 1 tablet by mouth Daily.  Dispense: 30 tablet; Refill: 3        Plan    1. Annual physical exam (Primary)- ordered fasting labs and follow-up with these.  Advised on nutrition and exercise and follow-up with this.    2. JOSE RAMON (generalized anxiety disorder)- referred to psychiatry and psychology.  Obtain GeneSight test.  Continue hydroxyzine for now.    3. Gastroesophageal reflux disease, unspecified whether esophagitis present- refill Protonix.  Follow-up with GI.    4. Alcohol use disorder- referred to psychiatry and psychology.      Return in about 4 weeks (around 2/14/2025) for Recheck.    Loy Costa PA-C  01/17/2025

## 2025-01-20 ENCOUNTER — PATIENT ROUNDING (BHMG ONLY) (OUTPATIENT)
Dept: INTERNAL MEDICINE | Facility: CLINIC | Age: 30
End: 2025-01-20
Payer: COMMERCIAL

## 2025-01-20 NOTE — PROGRESS NOTES
A  my chart message has been sent to the patient for patient rounding with [facility] [place] [facility].

## 2025-03-21 ENCOUNTER — TELEMEDICINE (OUTPATIENT)
Dept: INTERNAL MEDICINE | Facility: CLINIC | Age: 30
End: 2025-03-21
Payer: COMMERCIAL

## 2025-03-21 DIAGNOSIS — F41.1 GAD (GENERALIZED ANXIETY DISORDER): ICD-10-CM

## 2025-03-21 DIAGNOSIS — K21.9 GASTROESOPHAGEAL REFLUX DISEASE, UNSPECIFIED WHETHER ESOPHAGITIS PRESENT: ICD-10-CM

## 2025-03-21 DIAGNOSIS — F10.90 ALCOHOL USE DISORDER: Primary | ICD-10-CM

## 2025-03-21 PROCEDURE — 1159F MED LIST DOCD IN RCRD: CPT | Performed by: PHYSICIAN ASSISTANT

## 2025-03-21 PROCEDURE — 1160F RVW MEDS BY RX/DR IN RCRD: CPT | Performed by: PHYSICIAN ASSISTANT

## 2025-03-21 PROCEDURE — 99214 OFFICE O/P EST MOD 30 MIN: CPT | Performed by: PHYSICIAN ASSISTANT

## 2025-03-21 NOTE — PROGRESS NOTES
[facility] PRIMARY CARE  [address]  [facility] [address]  Dept: [phone]  Dept Fax: [phone]  Loc: [phone]  Loc Fax: [phone]    Salina De La Fuente  1995    Televisit Note    You have chosen to receive care through a telephone visit. Do you consent to use a telephone visit for your medical care today? Yes.  Patient at home and provider in office during visit today.    History of Present Illness:  Salina De La Fuente is a 29 y.o. female who presents via video-conference for alcohol use disorder.  Patient's last drink was approximately 2 hours ago.  Patient drinking half 1/5 of higher proof alcohol daily.  Patient wants to quit but anxiety getting in the way of this right now.    The following portions of the patient's history were reviewed and updated as appropriate: allergies, current medications, past family history, past medical history, past social history, past surgical history, and problem list.    This visit was scheduled as a phone visit to comply with patient safety concerns in accordance with CDC recommendations.  Time spent in discussion with the patient was 20 minutes.     Medications    Current Outpatient Medications:     Etonogestrel (Nexplanon) 68 MG implant subdermal implant, To be inserted one time by prescriber. Route Subdermal., Disp: , Rfl:     loratadine (CLARITIN) 10 MG tablet, Take 1 tablet by mouth Daily., Disp: , Rfl:     pantoprazole (PROTONIX) 40 MG EC tablet, Take 1 tablet by mouth Daily., Disp: 30 tablet, Rfl: 3    Subjective  Allergies   Allergen Reactions    Corticosteroids Hives     With liquid version, has safely taken pill form    Prednisone Hives     Liquid prednisone        Past Medical History:   Diagnosis Date    Anxiety     as a teenager    Asthma     never actually diagnosed    Eczema of both hands     History of gestational diabetes mellitus     2016    Kidney stone 01/2018    Nexplanon in place        History  reviewed. No pertinent surgical history.    Family History   Problem Relation Age of Onset    Stroke Father     Thyroid disease Mother     Breast cancer Paternal Grandmother     Cervical cancer Paternal Grandmother     Diabetes Paternal Grandmother     Uterine cancer Paternal Grandmother     Ovarian cancer Neg Hx     Colon cancer Neg Hx         Social History     Socioeconomic History    Marital status:    Tobacco Use    Smoking status: Never    Smokeless tobacco: Never   Vaping Use    Vaping status: Never Used   Substance and Sexual Activity    Alcohol use: No    Drug use: No    Sexual activity: Yes     Partners: Male     Birth control/protection: Nexplanon         Objective  There were no vitals filed for this visit.  There is no height or weight on file to calculate BMI.    Assessment  Diagnoses and all orders for this visit:    1. Alcohol use disorder (Primary)    2. JOSE RAMON (generalized anxiety disorder)    3. Gastroesophageal reflux disease, unspecified whether esophagitis present        Plan     1. Alcohol use disorder (Primary)- urged to quit and reiterated the importance of abstinence.  Advised to go to ER for detox.  Patient declined this.    2. JOSE RAMON (generalized anxiety disorder)- uncontrolled.  No longer taking any medication.      3. Gastroesophageal reflux disease, unspecified whether esophagitis present- Seems to be okay on Protonix.      Return in about 4 weeks (around 4/18/2025) for Recheck.    Loy Costa PA-C  03/21/2025

## 2025-03-27 ENCOUNTER — HOSPITAL ENCOUNTER (INPATIENT)
Facility: HOSPITAL | Age: 30
LOS: 2 days | Discharge: HOME | DRG: 897 | End: 2025-03-30
Attending: STUDENT IN AN ORGANIZED HEALTH CARE EDUCATION/TRAINING PROGRAM | Admitting: INTERNAL MEDICINE
Payer: COMMERCIAL

## 2025-03-27 ENCOUNTER — APPOINTMENT (OUTPATIENT)
Facility: HOSPITAL | Age: 30
DRG: 897 | End: 2025-03-27
Payer: COMMERCIAL

## 2025-03-27 DIAGNOSIS — F10.230 ALCOHOL DEPENDENCE WITH UNCOMPLICATED WITHDRAWAL: Primary | ICD-10-CM

## 2025-03-27 PROBLEM — F10.929 ALCOHOL INTOXICATION: Status: ACTIVE | Noted: 2025-03-27

## 2025-03-27 LAB
ALBUMIN SERPL-MCNC: 4.5 G/DL (ref 3.5–5.2)
ALBUMIN/GLOB SERPL: 1.4 G/DL
ALP SERPL-CCNC: 87 U/L (ref 39–117)
ALT SERPL W P-5'-P-CCNC: 264 U/L (ref 1–33)
AMPHET+METHAMPHET UR QL: NEGATIVE
AMPHETAMINES UR QL: NEGATIVE
ANION GAP SERPL CALCULATED.3IONS-SCNC: 22.6 MMOL/L (ref 5–15)
APAP SERPL-MCNC: <5 MCG/ML (ref 0–30)
AST SERPL-CCNC: 393 U/L (ref 1–32)
BACTERIA UR QL AUTO: ABNORMAL /HPF
BARBITURATES UR QL SCN: NEGATIVE
BASOPHILS # BLD AUTO: 0.02 10*3/MM3 (ref 0–0.2)
BASOPHILS NFR BLD AUTO: 0.6 % (ref 0–1.5)
BENZODIAZ UR QL SCN: NEGATIVE
BILIRUB SERPL-MCNC: 1.3 MG/DL (ref 0–1.2)
BILIRUB UR QL STRIP: NEGATIVE
BUN SERPL-MCNC: 5 MG/DL (ref 6–20)
BUN/CREAT SERPL: 9.4 (ref 7–25)
BUPRENORPHINE SERPL-MCNC: NEGATIVE NG/ML
CALCIUM SPEC-SCNC: 9.6 MG/DL (ref 8.6–10.5)
CANNABINOIDS SERPL QL: NEGATIVE
CHLORIDE SERPL-SCNC: 93 MMOL/L (ref 98–107)
CLARITY UR: CLEAR
CO2 SERPL-SCNC: 23.4 MMOL/L (ref 22–29)
COCAINE UR QL: NEGATIVE
COLOR UR: YELLOW
CREAT SERPL-MCNC: 0.53 MG/DL (ref 0.57–1)
DEPRECATED RDW RBC AUTO: 55.7 FL (ref 37–54)
EGFRCR SERPLBLD CKD-EPI 2021: 127 ML/MIN/1.73
EOSINOPHIL # BLD AUTO: 0 10*3/MM3 (ref 0–0.4)
EOSINOPHIL NFR BLD AUTO: 0 % (ref 0.3–6.2)
ERYTHROCYTE [DISTWIDTH] IN BLOOD BY AUTOMATED COUNT: 15.6 % (ref 12.3–15.4)
ETHANOL BLD-MCNC: <10 MG/DL (ref 0–10)
FENTANYL UR-MCNC: NEGATIVE NG/ML
GLOBULIN UR ELPH-MCNC: 3.3 GM/DL
GLUCOSE SERPL-MCNC: 122 MG/DL (ref 65–99)
GLUCOSE UR STRIP-MCNC: NEGATIVE MG/DL
HCG INTACT+B SERPL-ACNC: <0.2 MIU/ML
HCT VFR BLD AUTO: 40.8 % (ref 34–46.6)
HGB BLD-MCNC: 13.9 G/DL (ref 12–15.9)
HGB UR QL STRIP.AUTO: NEGATIVE
HOLD SPECIMEN: NORMAL
HYALINE CASTS UR QL AUTO: ABNORMAL /LPF
IMM GRANULOCYTES # BLD AUTO: 0.02 10*3/MM3 (ref 0–0.05)
IMM GRANULOCYTES NFR BLD AUTO: 0.6 % (ref 0–0.5)
KETONES UR QL STRIP: ABNORMAL
LEUKOCYTE ESTERASE UR QL STRIP.AUTO: NEGATIVE
LYMPHOCYTES # BLD AUTO: 0.9 10*3/MM3 (ref 0.7–3.1)
LYMPHOCYTES NFR BLD AUTO: 25.2 % (ref 19.6–45.3)
MAGNESIUM SERPL-MCNC: 1.5 MG/DL (ref 1.6–2.6)
MCH RBC QN AUTO: 32.9 PG (ref 26.6–33)
MCHC RBC AUTO-ENTMCNC: 34.1 G/DL (ref 31.5–35.7)
MCV RBC AUTO: 96.7 FL (ref 79–97)
METHADONE UR QL SCN: NEGATIVE
MONOCYTES # BLD AUTO: 0.52 10*3/MM3 (ref 0.1–0.9)
MONOCYTES NFR BLD AUTO: 14.6 % (ref 5–12)
NEUTROPHILS NFR BLD AUTO: 2.11 10*3/MM3 (ref 1.7–7)
NEUTROPHILS NFR BLD AUTO: 59 % (ref 42.7–76)
NITRITE UR QL STRIP: POSITIVE
OPIATES UR QL: NEGATIVE
OXYCODONE UR QL SCN: NEGATIVE
PCP UR QL SCN: NEGATIVE
PH UR STRIP.AUTO: 8.5 [PH] (ref 5–8)
PLATELET # BLD AUTO: 173 10*3/MM3 (ref 140–450)
PMV BLD AUTO: 10.5 FL (ref 6–12)
POTASSIUM SERPL-SCNC: 2.9 MMOL/L (ref 3.5–5.2)
PROT SERPL-MCNC: 7.8 G/DL (ref 6–8.5)
PROT UR QL STRIP: ABNORMAL
RBC # BLD AUTO: 4.22 10*6/MM3 (ref 3.77–5.28)
RBC # UR STRIP: ABNORMAL /HPF
REF LAB TEST METHOD: ABNORMAL
SALICYLATES SERPL-MCNC: <0.5 MG/DL
SODIUM SERPL-SCNC: 139 MMOL/L (ref 136–145)
SP GR UR STRIP: 1.01 (ref 1–1.03)
SQUAMOUS #/AREA URNS HPF: ABNORMAL /HPF
TRICYCLICS UR QL SCN: NEGATIVE
UROBILINOGEN UR QL STRIP: ABNORMAL
WBC # UR STRIP: ABNORMAL /HPF
WBC NRBC COR # BLD AUTO: 3.57 10*3/MM3 (ref 3.4–10.8)
WHOLE BLOOD HOLD COAG: NORMAL
WHOLE BLOOD HOLD SPECIMEN: NORMAL

## 2025-03-27 PROCEDURE — 99223 1ST HOSP IP/OBS HIGH 75: CPT | Performed by: INTERNAL MEDICINE

## 2025-03-27 PROCEDURE — 99285 EMERGENCY DEPT VISIT HI MDM: CPT

## 2025-03-27 PROCEDURE — 96376 TX/PRO/DX INJ SAME DRUG ADON: CPT

## 2025-03-27 PROCEDURE — 85025 COMPLETE CBC W/AUTO DIFF WBC: CPT | Performed by: STUDENT IN AN ORGANIZED HEALTH CARE EDUCATION/TRAINING PROGRAM

## 2025-03-27 PROCEDURE — 80143 DRUG ASSAY ACETAMINOPHEN: CPT | Performed by: PHYSICIAN ASSISTANT

## 2025-03-27 PROCEDURE — 25010000002 LORAZEPAM PER 2 MG: Performed by: STUDENT IN AN ORGANIZED HEALTH CARE EDUCATION/TRAINING PROGRAM

## 2025-03-27 PROCEDURE — 84702 CHORIONIC GONADOTROPIN TEST: CPT | Performed by: PHYSICIAN ASSISTANT

## 2025-03-27 PROCEDURE — 80307 DRUG TEST PRSMV CHEM ANLYZR: CPT | Performed by: PHYSICIAN ASSISTANT

## 2025-03-27 PROCEDURE — 96367 TX/PROPH/DG ADDL SEQ IV INF: CPT

## 2025-03-27 PROCEDURE — 25010000002 DIAZEPAM PER 5 MG: Performed by: INTERNAL MEDICINE

## 2025-03-27 PROCEDURE — 96375 TX/PRO/DX INJ NEW DRUG ADDON: CPT

## 2025-03-27 PROCEDURE — 25810000003 SODIUM CHLORIDE 0.9 % SOLUTION: Performed by: PHYSICIAN ASSISTANT

## 2025-03-27 PROCEDURE — 81001 URINALYSIS AUTO W/SCOPE: CPT | Performed by: PHYSICIAN ASSISTANT

## 2025-03-27 PROCEDURE — G0378 HOSPITAL OBSERVATION PER HR: HCPCS

## 2025-03-27 PROCEDURE — 83735 ASSAY OF MAGNESIUM: CPT | Performed by: PHYSICIAN ASSISTANT

## 2025-03-27 PROCEDURE — 25010000002 MAGNESIUM SULFATE 2 GM/50ML SOLUTION: Performed by: PHYSICIAN ASSISTANT

## 2025-03-27 PROCEDURE — 80053 COMPREHEN METABOLIC PANEL: CPT | Performed by: STUDENT IN AN ORGANIZED HEALTH CARE EDUCATION/TRAINING PROGRAM

## 2025-03-27 PROCEDURE — 25010000002 LORAZEPAM PER 2 MG: Performed by: PHYSICIAN ASSISTANT

## 2025-03-27 PROCEDURE — 25010000002 KETOROLAC TROMETHAMINE PER 15 MG: Performed by: PHYSICIAN ASSISTANT

## 2025-03-27 PROCEDURE — 93005 ELECTROCARDIOGRAM TRACING: CPT | Performed by: STUDENT IN AN ORGANIZED HEALTH CARE EDUCATION/TRAINING PROGRAM

## 2025-03-27 PROCEDURE — 80179 DRUG ASSAY SALICYLATE: CPT | Performed by: PHYSICIAN ASSISTANT

## 2025-03-27 PROCEDURE — 96368 THER/DIAG CONCURRENT INF: CPT

## 2025-03-27 PROCEDURE — 25010000002 POTASSIUM CHLORIDE 10 MEQ/100ML SOLUTION

## 2025-03-27 PROCEDURE — 25010000002 ONDANSETRON PER 1 MG: Performed by: PHYSICIAN ASSISTANT

## 2025-03-27 PROCEDURE — 25810000003 SODIUM CHLORIDE 0.9 % SOLUTION: Performed by: INTERNAL MEDICINE

## 2025-03-27 PROCEDURE — 96365 THER/PROPH/DIAG IV INF INIT: CPT

## 2025-03-27 PROCEDURE — 82077 ASSAY SPEC XCP UR&BREATH IA: CPT | Performed by: STUDENT IN AN ORGANIZED HEALTH CARE EDUCATION/TRAINING PROGRAM

## 2025-03-27 RX ORDER — ACETAMINOPHEN 325 MG/1
650 TABLET ORAL EVERY 4 HOURS PRN
Status: DISCONTINUED | OUTPATIENT
Start: 2025-03-27 | End: 2025-03-27

## 2025-03-27 RX ORDER — MIDAZOLAM HYDROCHLORIDE 1 MG/ML
4 INJECTION, SOLUTION INTRAMUSCULAR; INTRAVENOUS
Status: DISCONTINUED | OUTPATIENT
Start: 2025-03-27 | End: 2025-03-30 | Stop reason: HOSPADM

## 2025-03-27 RX ORDER — HYDROCODONE BITARTRATE AND ACETAMINOPHEN 5; 325 MG/1; MG/1
1 TABLET ORAL EVERY 6 HOURS PRN
Refills: 0 | Status: DISCONTINUED | OUTPATIENT
Start: 2025-03-27 | End: 2025-03-30 | Stop reason: HOSPADM

## 2025-03-27 RX ORDER — MIDAZOLAM HYDROCHLORIDE 1 MG/ML
2 INJECTION, SOLUTION INTRAMUSCULAR; INTRAVENOUS
Status: DISCONTINUED | OUTPATIENT
Start: 2025-03-27 | End: 2025-03-30 | Stop reason: HOSPADM

## 2025-03-27 RX ORDER — POTASSIUM CHLORIDE 1.5 G/1.58G
40 POWDER, FOR SOLUTION ORAL ONCE
Status: DISCONTINUED | OUTPATIENT
Start: 2025-03-27 | End: 2025-03-30 | Stop reason: HOSPADM

## 2025-03-27 RX ORDER — DIAZEPAM 5 MG/1
2.5 TABLET ORAL ONCE
Status: COMPLETED | OUTPATIENT
Start: 2025-03-27 | End: 2025-03-27

## 2025-03-27 RX ORDER — SODIUM CHLORIDE 0.9 % (FLUSH) 0.9 %
10 SYRINGE (ML) INJECTION AS NEEDED
Status: DISCONTINUED | OUTPATIENT
Start: 2025-03-27 | End: 2025-03-30 | Stop reason: HOSPADM

## 2025-03-27 RX ORDER — POTASSIUM CHLORIDE 7.45 MG/ML
10 INJECTION INTRAVENOUS
Status: DISPENSED | OUTPATIENT
Start: 2025-03-27 | End: 2025-03-27

## 2025-03-27 RX ORDER — DIAZEPAM 10 MG/2ML
10 INJECTION, SOLUTION INTRAMUSCULAR; INTRAVENOUS
Status: DISCONTINUED | OUTPATIENT
Start: 2025-03-27 | End: 2025-03-27

## 2025-03-27 RX ORDER — FOLIC ACID 1 MG/1
1 TABLET ORAL DAILY
Status: DISCONTINUED | OUTPATIENT
Start: 2025-03-28 | End: 2025-03-30 | Stop reason: HOSPADM

## 2025-03-27 RX ORDER — DIAZEPAM 5 MG/1
20 TABLET ORAL
Status: DISCONTINUED | OUTPATIENT
Start: 2025-03-27 | End: 2025-03-27

## 2025-03-27 RX ORDER — MAGNESIUM SULFATE HEPTAHYDRATE 40 MG/ML
1 INJECTION, SOLUTION INTRAVENOUS ONCE
Status: COMPLETED | OUTPATIENT
Start: 2025-03-27 | End: 2025-03-27

## 2025-03-27 RX ORDER — MORPHINE SULFATE 2 MG/ML
2 INJECTION, SOLUTION INTRAMUSCULAR; INTRAVENOUS EVERY 4 HOURS PRN
Status: DISCONTINUED | OUTPATIENT
Start: 2025-03-27 | End: 2025-03-30 | Stop reason: HOSPADM

## 2025-03-27 RX ORDER — NITROGLYCERIN 0.4 MG/1
0.4 TABLET SUBLINGUAL
Status: DISCONTINUED | OUTPATIENT
Start: 2025-03-27 | End: 2025-03-30 | Stop reason: HOSPADM

## 2025-03-27 RX ORDER — LORAZEPAM 1 MG/1
1 TABLET ORAL
Status: DISCONTINUED | OUTPATIENT
Start: 2025-03-27 | End: 2025-03-30 | Stop reason: HOSPADM

## 2025-03-27 RX ORDER — DICYCLOMINE HYDROCHLORIDE 10 MG/1
20 CAPSULE ORAL ONCE
Status: COMPLETED | OUTPATIENT
Start: 2025-03-27 | End: 2025-03-27

## 2025-03-27 RX ORDER — SODIUM CHLORIDE 0.9 % (FLUSH) 0.9 %
10 SYRINGE (ML) INJECTION EVERY 12 HOURS SCHEDULED
Status: DISCONTINUED | OUTPATIENT
Start: 2025-03-27 | End: 2025-03-30 | Stop reason: HOSPADM

## 2025-03-27 RX ORDER — DIAZEPAM 5 MG/1
10 TABLET ORAL
Status: DISCONTINUED | OUTPATIENT
Start: 2025-03-27 | End: 2025-03-27

## 2025-03-27 RX ORDER — LORAZEPAM 1 MG/1
1 TABLET ORAL EVERY 6 HOURS
Status: COMPLETED | OUTPATIENT
Start: 2025-03-28 | End: 2025-03-29

## 2025-03-27 RX ORDER — FOLIC ACID 1 MG/1
1 TABLET ORAL DAILY
Status: DISCONTINUED | OUTPATIENT
Start: 2025-03-28 | End: 2025-03-27

## 2025-03-27 RX ORDER — POTASSIUM CHLORIDE 1.5 G/1.58G
40 POWDER, FOR SOLUTION ORAL EVERY 4 HOURS
Status: DISCONTINUED | OUTPATIENT
Start: 2025-03-27 | End: 2025-03-27

## 2025-03-27 RX ORDER — LORAZEPAM 2 MG/ML
1 INJECTION INTRAMUSCULAR ONCE
Status: COMPLETED | OUTPATIENT
Start: 2025-03-27 | End: 2025-03-27

## 2025-03-27 RX ORDER — DIAZEPAM 10 MG/2ML
20 INJECTION, SOLUTION INTRAMUSCULAR; INTRAVENOUS
Status: DISCONTINUED | OUTPATIENT
Start: 2025-03-27 | End: 2025-03-27

## 2025-03-27 RX ORDER — KETOROLAC TROMETHAMINE 30 MG/ML
30 INJECTION, SOLUTION INTRAMUSCULAR; INTRAVENOUS ONCE
Status: COMPLETED | OUTPATIENT
Start: 2025-03-27 | End: 2025-03-27

## 2025-03-27 RX ORDER — THIAMINE HYDROCHLORIDE 100 MG/ML
200 INJECTION, SOLUTION INTRAMUSCULAR; INTRAVENOUS EVERY 8 HOURS SCHEDULED
Status: DISCONTINUED | OUTPATIENT
Start: 2025-03-27 | End: 2025-03-30 | Stop reason: HOSPADM

## 2025-03-27 RX ORDER — CEFUROXIME AXETIL 250 MG/1
500 TABLET ORAL ONCE
Status: COMPLETED | OUTPATIENT
Start: 2025-03-27 | End: 2025-03-27

## 2025-03-27 RX ORDER — PANTOPRAZOLE SODIUM 40 MG/1
40 TABLET, DELAYED RELEASE ORAL DAILY
Status: DISCONTINUED | OUTPATIENT
Start: 2025-03-28 | End: 2025-03-30 | Stop reason: HOSPADM

## 2025-03-27 RX ORDER — IBUPROFEN 400 MG/1
400 TABLET, FILM COATED ORAL EVERY 6 HOURS PRN
Status: DISCONTINUED | OUTPATIENT
Start: 2025-03-27 | End: 2025-03-30 | Stop reason: HOSPADM

## 2025-03-27 RX ORDER — ONDANSETRON 2 MG/ML
4 INJECTION INTRAMUSCULAR; INTRAVENOUS EVERY 6 HOURS PRN
Status: DISCONTINUED | OUTPATIENT
Start: 2025-03-27 | End: 2025-03-30 | Stop reason: HOSPADM

## 2025-03-27 RX ORDER — MULTIPLE VITAMINS W/ MINERALS TAB 9MG-400MCG
1 TAB ORAL DAILY
Status: DISCONTINUED | OUTPATIENT
Start: 2025-03-28 | End: 2025-03-30 | Stop reason: HOSPADM

## 2025-03-27 RX ORDER — POTASSIUM CHLORIDE 1.5 G/1.58G
40 POWDER, FOR SOLUTION ORAL ONCE
Status: COMPLETED | OUTPATIENT
Start: 2025-03-27 | End: 2025-03-27

## 2025-03-27 RX ORDER — DIAZEPAM 5 MG/1
15 TABLET ORAL
Status: DISCONTINUED | OUTPATIENT
Start: 2025-03-27 | End: 2025-03-27

## 2025-03-27 RX ORDER — ACETAMINOPHEN 650 MG/1
650 SUPPOSITORY RECTAL EVERY 4 HOURS PRN
Status: DISCONTINUED | OUTPATIENT
Start: 2025-03-27 | End: 2025-03-27

## 2025-03-27 RX ORDER — LORAZEPAM 1 MG/1
1 TABLET ORAL EVERY 12 HOURS SCHEDULED
Status: COMPLETED | OUTPATIENT
Start: 2025-03-29 | End: 2025-03-30

## 2025-03-27 RX ORDER — NALOXONE HCL 0.4 MG/ML
0.4 VIAL (ML) INJECTION
Status: DISCONTINUED | OUTPATIENT
Start: 2025-03-27 | End: 2025-03-30 | Stop reason: HOSPADM

## 2025-03-27 RX ORDER — ONDANSETRON 2 MG/ML
4 INJECTION INTRAMUSCULAR; INTRAVENOUS ONCE
Status: COMPLETED | OUTPATIENT
Start: 2025-03-27 | End: 2025-03-27

## 2025-03-27 RX ORDER — LORAZEPAM 1 MG/1
1 TABLET ORAL DAILY
Status: DISCONTINUED | OUTPATIENT
Start: 2025-03-31 | End: 2025-03-30 | Stop reason: HOSPADM

## 2025-03-27 RX ORDER — SODIUM CHLORIDE 9 MG/ML
40 INJECTION, SOLUTION INTRAVENOUS AS NEEDED
Status: DISCONTINUED | OUTPATIENT
Start: 2025-03-27 | End: 2025-03-30 | Stop reason: HOSPADM

## 2025-03-27 RX ORDER — LORAZEPAM 1 MG/1
2 TABLET ORAL
Status: DISCONTINUED | OUTPATIENT
Start: 2025-03-27 | End: 2025-03-30 | Stop reason: HOSPADM

## 2025-03-27 RX ORDER — THIAMINE HYDROCHLORIDE 100 MG/ML
200 INJECTION, SOLUTION INTRAMUSCULAR; INTRAVENOUS EVERY 8 HOURS SCHEDULED
Status: DISCONTINUED | OUTPATIENT
Start: 2025-03-27 | End: 2025-03-27

## 2025-03-27 RX ORDER — ACETAMINOPHEN 160 MG/5ML
650 SOLUTION ORAL EVERY 4 HOURS PRN
Status: DISCONTINUED | OUTPATIENT
Start: 2025-03-27 | End: 2025-03-27

## 2025-03-27 RX ORDER — SODIUM CHLORIDE 9 MG/ML
125 INJECTION, SOLUTION INTRAVENOUS CONTINUOUS
Status: ACTIVE | OUTPATIENT
Start: 2025-03-27 | End: 2025-03-28

## 2025-03-27 RX ORDER — DIAZEPAM 10 MG/2ML
15 INJECTION, SOLUTION INTRAMUSCULAR; INTRAVENOUS
Status: DISCONTINUED | OUTPATIENT
Start: 2025-03-27 | End: 2025-03-27

## 2025-03-27 RX ORDER — LORAZEPAM 1 MG/1
2 TABLET ORAL EVERY 6 HOURS
Status: COMPLETED | OUTPATIENT
Start: 2025-03-27 | End: 2025-03-28

## 2025-03-27 RX ADMIN — MAGNESIUM SULFATE HEPTAHYDRATE 1 G: 40 INJECTION, SOLUTION INTRAVENOUS at 15:53

## 2025-03-27 RX ADMIN — SODIUM CHLORIDE 125 ML/HR: 9 INJECTION, SOLUTION INTRAVENOUS at 22:04

## 2025-03-27 RX ADMIN — POTASSIUM CHLORIDE 40 MEQ: 1.5 FOR SOLUTION ORAL at 16:16

## 2025-03-27 RX ADMIN — CEFUROXIME AXETIL 500 MG: 250 TABLET, FILM COATED ORAL at 17:12

## 2025-03-27 RX ADMIN — SODIUM CHLORIDE 1000 ML: 9 INJECTION, SOLUTION INTRAVENOUS at 16:18

## 2025-03-27 RX ADMIN — Medication 100 MG: at 15:10

## 2025-03-27 RX ADMIN — LORAZEPAM 1 MG: 2 INJECTION INTRAMUSCULAR; INTRAVENOUS at 16:47

## 2025-03-27 RX ADMIN — Medication 10 ML: at 22:06

## 2025-03-27 RX ADMIN — ONDANSETRON 4 MG: 2 INJECTION INTRAMUSCULAR; INTRAVENOUS at 15:10

## 2025-03-27 RX ADMIN — POTASSIUM CHLORIDE 10 MEQ: 7.46 INJECTION, SOLUTION INTRAVENOUS at 16:18

## 2025-03-27 RX ADMIN — LORAZEPAM 2 MG: 1 TABLET ORAL at 23:07

## 2025-03-27 RX ADMIN — DIAZEPAM 2.5 MG: 5 TABLET ORAL at 14:29

## 2025-03-27 RX ADMIN — DIAZEPAM 15 MG: 10 INJECTION, SOLUTION INTRAMUSCULAR; INTRAVENOUS at 22:03

## 2025-03-27 RX ADMIN — DICYCLOMINE HYDROCHLORIDE 20 MG: 10 CAPSULE ORAL at 15:10

## 2025-03-27 RX ADMIN — KETOROLAC TROMETHAMINE 30 MG: 30 INJECTION, SOLUTION INTRAMUSCULAR; INTRAVENOUS at 17:46

## 2025-03-27 RX ADMIN — LORAZEPAM 1 MG: 2 INJECTION INTRAMUSCULAR; INTRAVENOUS at 18:17

## 2025-03-27 NOTE — ED NOTES
[facility]    Nursing Report ED to Floor:  Mental status: AOX4  Ambulatory status: 1X ASSISTANCE  Oxygen Therapy:  RA  Cardiac Rhythm: TACHTY  Admitted from: HOME/ER  Safety Concerns:  SEIZURE PRECAUTIONS IN PLACE    Precautions: SEIZURE PADS IN PLACE  Social Issues: MOM AT BEDSIDE  ED Room #:  ANNIE ROOM 8    ED Nurse Phone Extension - [phone] or may call [phone].      HPI:   Chief Complaint   Patient presents with    Alcohol Problem       Past Medical History:  Past Medical History:   Diagnosis Date    Anxiety     as a teenager    Asthma     never actually diagnosed    Eczema of both hands     History of gestational diabetes mellitus     2016    Kidney stone 01/2018    Nexplanon in place         Past Surgical History:  History reviewed. No pertinent surgical history.     Admitting Doctor:   Jamie Varghese MD    Consulting Provider(s):  Consults       No orders found from 2/26/2025 to 3/28/2025.             Admitting Diagnosis:   The encounter diagnosis was Alcohol dependence with uncomplicated withdrawal.    Most Recent Vitals:   Vitals:    03/27/25 1500 03/27/25 1530 03/27/25 1600 03/27/25 1800   BP: 141/94 128/96 138/99 130/84   BP Location:       Patient Position:       Pulse: 109 100 104 118   Resp:       Temp:       TempSrc:       SpO2: 98% 97% 99% 100%   Weight:       Height:           Active LDAs/IV Access:   Lines, Drains & Airways       Active LDAs       Name Placement date Placement time Site Days    Peripheral IV 03/27/25 1424 Right Antecubital 03/27/25  1424  Antecubital  less than 1                    Labs (abnormal labs have a star):   Labs Reviewed   CBC WITH AUTO DIFFERENTIAL - Abnormal; Notable for the following components:       Result Value    RDW 15.6 (*)     RDW-SD 55.7 (*)     Monocyte % 14.6 (*)     Eosinophil % 0.0 (*)     Immature Grans % 0.6 (*)     All other components within normal limits   COMPREHENSIVE METABOLIC PANEL - Abnormal; Notable for the following components:     Glucose 122 (*)     BUN 5 (*)     Creatinine 0.53 (*)     Potassium 2.9 (*)     Chloride 93 (*)     ALT (SGPT) 264 (*)     AST (SGOT) 393 (*)     Total Bilirubin 1.3 (*)     Anion Gap 22.6 (*)     All other components within normal limits    Narrative:     GFR Categories in Chronic Kidney Disease (CKD)      GFR Category          GFR (mL/min/1.73)    Interpretation  G1                     90 or greater         Normal or high (1)  G2                      60-89                Mild decrease (1)  G3a                   45-59                Mild to moderate decrease  G3b                   30-44                Moderate to severe decrease  G4                    15-29                Severe decrease  G5                    14 or less           Kidney failure          (1)In the absence of evidence of kidney disease, neither GFR category G1 or G2 fulfill the criteria for CKD.    eGFR calculation 2021 CKD-EPI creatinine equation, which does not include race as a factor   URINALYSIS W/ MICROSCOPIC IF INDICATED (NO CULTURE) - Abnormal; Notable for the following components:    pH, UA 8.5 (*)     Ketones, UA 15 mg/dL (1+) (*)     Protein, UA Trace (*)     Nitrite, UA Positive (*)     All other components within normal limits   MAGNESIUM - Abnormal; Notable for the following components:    Magnesium 1.5 (*)     All other components within normal limits   URINALYSIS, MICROSCOPIC ONLY - Abnormal; Notable for the following components:    WBC, UA 3-5 (*)     Bacteria, UA 4+ (*)     All other components within normal limits   ETHANOL - Normal    Narrative:     Elevated lactic acid concentration and lactate dehydrogenase(LD) activity may falsely elevate enzymatically determined ethanol levels. Not for legal purposes.    URINE DRUG SCREEN - Normal    Narrative:     Cutoff For Drugs Screened:    Amphetamines               500 ng/ml  Barbiturates               200 ng/ml  Benzodiazepines            150 ng/ml  Cocaine                    150  ng/ml  Methadone                  200 ng/ml  Opiates                    100 ng/ml  Phencyclidine               25 ng/ml  THC                         50 ng/ml  Methamphetamine            500 ng/ml  Tricyclic Antidepressants  300 ng/ml  Oxycodone                  100 ng/ml  Buprenorphine               10 ng/ml    The normal value for all drugs tested is negative. This report includes unconfirmed screening results, with the cutoff values listed, to be used for medical treatment purposes only.  Unconfirmed results must not be used for non-medical purposes such as employment or legal testing.  Clinical consideration should be applied to any drug of abuse test, particularly when unconfirmed results are used.     ACETAMINOPHEN LEVEL - Normal   SALICYLATE LEVEL - Normal   FENTANYL, URINE - Normal    Narrative:     Negative Threshold:      Fentanyl 5 ng/mL     The normal value for the drug tested is negative. This report includes final unconfirmed screening results to be used for medical treatment purposes only. Unconfirmed results must not be used for non-medical purposes such as employment or legal testing. Clinical consideration should be applied to any drug of abuse test, particularly when unconfirmed results are used.          RAINBOW DRAW    Narrative:     The following orders were created for panel order [place] Draw.  Procedure                               Abnormality         Status                     ---------                               -----------         ------                     Green Top (Gel)[[id number]]                                  Final result               Lavender Top[[id number]]                                     Final result               Gold Top - SST[[id number]]                                   Final result               Gray Top[[id number]]                                         Final result               Light Blue Top[[id number]]                                   Final result                  Please view results for these tests on the individual orders.   HCG, QUANTITATIVE, PREGNANCY    Narrative:     HCG Ranges by Gestational Age    Females - non-pregnant premenopausal   </= 1mIU/mL HCG  Females - postmenopausal               </= 7mIU/mL HCG    3 Weeks         5.8 -    71.2 mIU/mL  4 Weeks         9.5 -     750 mIU/mL  5 Weeks         217 -   7,138 mIU/mL  6 Weeks         158 -  31,795 mIU/mL  7 Weeks       3,697 - 163,563 mIU/mL  8 Weeks      32,065 - 149,571 mIU/mL  9 Weeks      63,803 - 151,410 mIU/mL  10 Weeks     46,509 - 186,977 mIU/mL  12 Weeks     27,832 - 210,612 mIU/mL  14 Weeks     13,950 -  62,530 mIU/mL  15 Weeks     12,039 -  70,971 mIU/mL  16 Weeks      9,040 -  56,451 mIU/mL  17 Weeks      8,175 -  55,868 mIU/mL  18 Weeks      8,099 -  58,176 mIU/mL   GREEN TOP   LAVENDER TOP   GOLD TOP - SST   GRAY TOP   LIGHT BLUE TOP       Meds Given in ED:   Medications   sodium chloride 0.9 % flush 10 mL (has no administration in time range)   thiamine (VITAMIN B-1) tablet 100 mg (100 mg Oral Given 3/27/25 1510)   Potassium Replacement - Follow Nurse / BPA Driven Protocol (has no administration in time range)   potassium chloride (KLOR-CON) packet 40 mEq (40 mEq Oral Not Given 3/27/25 1801)   potassium chloride 10 mEq in 100 mL IVPB (0 mEq Intravenous Stopped 3/27/25 1804)   diazePAM (VALIUM) tablet 2.5 mg (2.5 mg Oral Given 3/27/25 1429)   ondansetron (ZOFRAN) injection 4 mg (4 mg Intravenous Given 3/27/25 1510)   dicyclomine (BENTYL) capsule 20 mg (20 mg Oral Given 3/27/25 1510)   potassium chloride (KLOR-CON) packet 40 mEq (40 mEq Oral Given 3/27/25 1616)   magnesium sulfate 2g/50 mL (PREMIX) infusion (0 g Intravenous Stopped 3/27/25 1708)   sodium chloride 0.9 % bolus 1,000 mL (0 mL Intravenous Stopped 3/27/25 1804)   LORazepam (ATIVAN) injection 1 mg (1 mg Intravenous Given 3/27/25 1647)   cefuroxime (CEFTIN) tablet 500 mg (500 mg Oral Given 3/27/25 1712)   ketorolac (TORADOL) injection 30  mg (30 mg Intravenous Given 3/27/25 1746)           Last NIH score:                                                          Dysphagia screening results:        Yudith Coma Scale:  No data recorded     CIWA:        Restraint Type:            Isolation Status:  No active isolations

## 2025-03-27 NOTE — FSED PROVIDER NOTE
Subjective   History of Present Illness  Patient is a 29-year-old female who presents emergency department complaining of alcohol withdrawal.  Patient states that she drinks around half 1/5 of vodka every day.  States she has been doing this for a long period of time.  Patient reports that she was last sober in December, states she was sober for about 2 weeks after going to an inpatient facility, then states that she relapsed, and withdrew at home on medications, states that she was sober for about 1 month.  Patient states that she consulted with her primary care physician several days ago and informed that she would like to detox again.  He informed her to come to the emergency room for further evaluation and possible admission to inpatient detox unit.  Patient states that her last drink was at 9 PM last night.  Her mother is accompanying her and states that she knows she has not drank anything since then.  Patient reports that she is very shaky, states that she is nauseous and has had several episodes of vomiting.  Patient denies pregnancy.  Patient reports that she has a history of anxiety, asthma, eczema.  She reports that she has a Nexplanon in her left arm.  Patient states that she has not taken any medications that been prescribed to her for the last 3 to 6 months    History provided by:  Patient   used: No        Review of Systems   Gastrointestinal:  Positive for nausea and vomiting.   Psychiatric/Behavioral:          Alcohol withdraw     All other systems reviewed and are negative.      Past Medical History:   Diagnosis Date    Anxiety     as a teenager    Asthma     never actually diagnosed    Eczema of both hands     History of gestational diabetes mellitus     2016    Kidney stone 01/2018    Nexplanon in place        Allergies   Allergen Reactions    Corticosteroids Hives     With liquid version, has safely taken pill form    Prednisone Hives     Liquid prednisone       History  reviewed. No pertinent surgical history.    Family History   Problem Relation Age of Onset    Stroke Father     Thyroid disease Mother     Breast cancer Paternal Grandmother     Cervical cancer Paternal Grandmother     Diabetes Paternal Grandmother     Uterine cancer Paternal Grandmother     Ovarian cancer Neg Hx     Colon cancer Neg Hx        Social History     Socioeconomic History    Marital status:    Tobacco Use    Smoking status: Never    Smokeless tobacco: Never   Vaping Use    Vaping status: Never Used   Substance and Sexual Activity    Alcohol use: No    Drug use: No    Sexual activity: Yes     Partners: Male     Birth control/protection: Nexplanon           Objective   Physical Exam  Vitals and nursing note reviewed.   Constitutional:       General: She is in acute distress.      Appearance: She is diaphoretic.   HENT:      Head: Normocephalic.      Nose: Nose normal.   Eyes:      Pupils: Pupils are equal, round, and reactive to light.   Cardiovascular:      Rate and Rhythm: Regular rhythm. Tachycardia present.      Pulses: Normal pulses.   Pulmonary:      Effort: Pulmonary effort is normal.      Breath sounds: Normal breath sounds.   Abdominal:      General: Abdomen is flat.      Tenderness: There is no abdominal tenderness.   Musculoskeletal:         General: Normal range of motion.   Skin:     General: Skin is warm and dry.   Neurological:      General: No focal deficit present.      Mental Status: She is alert and oriented to person, place, and time.      Comments: CIWA 12     Psychiatric:         Thought Content: Thought content normal.         Judgment: Judgment normal.      Comments: Anxious           Procedures           ED Course  ED Course as of 03/27/25 1759   Thu Mar 27, 2025   1534 Magnesium(!): 1.5 [WB]   1534 Salicylate: <0.5 [WB]   1534 Glucose(!): 122 [WB]   1534 BUN(!): 5 [WB]   1534 Creatinine(!): 0.53 [WB]   1534 WBC: 3.57 [WB]   1534 Hemoglobin: 13.9 [WB]   1534 Hematocrit: 40.8  [WB]   1534 Acetaminophen: <5.0 [WB]   1534 Ethanol: <10 [WB]   1534 HCG Quantitative: <0.20 [WB]      ED Course User Index  [WB] Starr, Mariaelena Y, PAEllenC                                      WBC   Date Value Ref Range Status   03/27/2025 3.57 3.40 - 10.80 10*3/mm3 Final     RBC   Date Value Ref Range Status   03/27/2025 4.22 3.77 - 5.28 10*6/mm3 Final     Hemoglobin   Date Value Ref Range Status   03/27/2025 13.9 12.0 - 15.9 g/dL Final     Hematocrit   Date Value Ref Range Status   03/27/2025 40.8 34.0 - 46.6 % Final     MCV   Date Value Ref Range Status   03/27/2025 96.7 79.0 - 97.0 fL Final     MCH   Date Value Ref Range Status   03/27/2025 32.9 26.6 - 33.0 pg Final     MCHC   Date Value Ref Range Status   03/27/2025 34.1 31.5 - 35.7 g/dL Final     RDW   Date Value Ref Range Status   03/27/2025 15.6 (H) 12.3 - 15.4 % Final     RDW-SD   Date Value Ref Range Status   03/27/2025 55.7 (H) 37.0 - 54.0 fl Final     MPV   Date Value Ref Range Status   03/27/2025 10.5 6.0 - 12.0 fL Final     Platelets   Date Value Ref Range Status   03/27/2025 173 140 - 450 10*3/mm3 Final     Neutrophil %   Date Value Ref Range Status   03/27/2025 59.0 42.7 - 76.0 % Final     Lymphocyte %   Date Value Ref Range Status   03/27/2025 25.2 19.6 - 45.3 % Final     Monocyte %   Date Value Ref Range Status   03/27/2025 14.6 (H) 5.0 - 12.0 % Final     Eosinophil %   Date Value Ref Range Status   03/27/2025 0.0 (L) 0.3 - 6.2 % Final     Basophil %   Date Value Ref Range Status   03/27/2025 0.6 0.0 - 1.5 % Final     Immature Grans %   Date Value Ref Range Status   03/27/2025 0.6 (H) 0.0 - 0.5 % Final     Neutrophils, Absolute   Date Value Ref Range Status   03/27/2025 2.11 1.70 - 7.00 10*3/mm3 Final     Lymphocytes, Absolute   Date Value Ref Range Status   03/27/2025 0.90 0.70 - 3.10 10*3/mm3 Final     Monocytes, Absolute   Date Value Ref Range Status   03/27/2025 0.52 0.10 - 0.90 10*3/mm3 Final     Eosinophils, Absolute   Date Value Ref Range  Status   03/27/2025 0.00 0.00 - 0.40 10*3/mm3 Final     Basophils, Absolute   Date Value Ref Range Status   03/27/2025 0.02 0.00 - 0.20 10*3/mm3 Final     Immature Grans, Absolute   Date Value Ref Range Status   03/27/2025 0.02 0.00 - 0.05 10*3/mm3 Final     Lab Results   Component Value Date    GLUCOSE 122 (H) 03/27/2025    BUN 5 (L) 03/27/2025    CREATININE 0.53 (L) 03/27/2025     03/27/2025    K 2.9 (L) 03/27/2025    CL 93 (L) 03/27/2025    CALCIUM 9.6 03/27/2025    PROTEINTOT 7.8 03/27/2025    ALBUMIN 4.5 03/27/2025     (H) 03/27/2025     (H) 03/27/2025    ALKPHOS 87 03/27/2025    BILITOT 1.3 (H) 03/27/2025    GLOB 3.3 03/27/2025    AGRATIO 1.4 03/27/2025    BCR 9.4 03/27/2025    ANIONGAP 22.6 (H) 03/27/2025    EGFR 127.0 03/27/2025         Medical Decision Making  29-year-old female who presents emergency room complaint of alcohol withdrawal.  Differential diagnosis includes alcohol withdrawal seizures, alcohol withdrawal, detox, delirium tremens, nausea vomiting.  Physical exam patient is tremulous and very anxious.  She is also tachycardic.  CIWA score is a 12 on initial evaluation.  Intervention includes p.o. Valium and IV Ativan.  Patient is also given potassium and magnesium after her labs returned showing that she has hypokalemia and hypomagnesemia.  EtOH was negative.  Drug screen was negative.  Other labs are nonactionable. Patient will be transferred to [facility] for alcohol withdraw      Amount and/or Complexity of Data Reviewed  Labs: ordered. Decision-making details documented in ED Course.  ECG/medicine tests: ordered.    Risk  OTC drugs.  Prescription drug management.        Final diagnoses:   Alcohol dependence with uncomplicated withdrawal       ED Disposition  ED Disposition       ED Disposition   Decision to Admit    Condition   --    Comment   Level of Care: Telemetry [5]   Accepting Provider:: KRISTY JORGENSEN [[id number]]   Admitting Physician: AYE PEREZ  [[id number]]                 No follow-up provider specified.       Medication List      No changes were made to your prescriptions during this visit.

## 2025-03-28 LAB
ALBUMIN SERPL-MCNC: 3.7 G/DL (ref 3.5–5.2)
ALBUMIN/GLOB SERPL: 1.4 G/DL
ALP SERPL-CCNC: 80 U/L (ref 39–117)
ALT SERPL W P-5'-P-CCNC: 214 U/L (ref 1–33)
ANION GAP SERPL CALCULATED.3IONS-SCNC: 17 MMOL/L (ref 5–15)
AST SERPL-CCNC: 474 U/L (ref 1–32)
BASOPHILS # BLD AUTO: 0.03 10*3/MM3 (ref 0–0.2)
BASOPHILS NFR BLD AUTO: 1.3 % (ref 0–1.5)
BILIRUB SERPL-MCNC: 1.1 MG/DL (ref 0–1.2)
BUN SERPL-MCNC: 5 MG/DL (ref 6–20)
BUN/CREAT SERPL: 10.9 (ref 7–25)
CALCIUM SPEC-SCNC: 8.6 MG/DL (ref 8.6–10.5)
CHLORIDE SERPL-SCNC: 99 MMOL/L (ref 98–107)
CO2 SERPL-SCNC: 23 MMOL/L (ref 22–29)
CREAT SERPL-MCNC: 0.46 MG/DL (ref 0.57–1)
DEPRECATED RDW RBC AUTO: 58.4 FL (ref 37–54)
EGFRCR SERPLBLD CKD-EPI 2021: 131.4 ML/MIN/1.73
EOSINOPHIL # BLD AUTO: 0.03 10*3/MM3 (ref 0–0.4)
EOSINOPHIL NFR BLD AUTO: 1.3 % (ref 0.3–6.2)
ERYTHROCYTE [DISTWIDTH] IN BLOOD BY AUTOMATED COUNT: 16 % (ref 12.3–15.4)
GLOBULIN UR ELPH-MCNC: 2.6 GM/DL
GLUCOSE SERPL-MCNC: 101 MG/DL (ref 65–99)
HAV IGM SERPL QL IA: NORMAL
HBV CORE IGM SERPL QL IA: NORMAL
HBV SURFACE AG SERPL QL IA: NORMAL
HCT VFR BLD AUTO: 35.1 % (ref 34–46.6)
HCV AB SER QL: NORMAL
HGB BLD-MCNC: 11.7 G/DL (ref 12–15.9)
IMM GRANULOCYTES # BLD AUTO: 0.01 10*3/MM3 (ref 0–0.05)
IMM GRANULOCYTES NFR BLD AUTO: 0.4 % (ref 0–0.5)
LYMPHOCYTES # BLD AUTO: 1.14 10*3/MM3 (ref 0.7–3.1)
LYMPHOCYTES NFR BLD AUTO: 48.1 % (ref 19.6–45.3)
MAGNESIUM SERPL-MCNC: 1.9 MG/DL (ref 1.6–2.6)
MCH RBC QN AUTO: 33.3 PG (ref 26.6–33)
MCHC RBC AUTO-ENTMCNC: 33.3 G/DL (ref 31.5–35.7)
MCV RBC AUTO: 100 FL (ref 79–97)
MONOCYTES # BLD AUTO: 0.28 10*3/MM3 (ref 0.1–0.9)
MONOCYTES NFR BLD AUTO: 11.8 % (ref 5–12)
NEUTROPHILS NFR BLD AUTO: 0.88 10*3/MM3 (ref 1.7–7)
NEUTROPHILS NFR BLD AUTO: 37.1 % (ref 42.7–76)
NRBC BLD AUTO-RTO: 0 /100 WBC (ref 0–0.2)
PHOSPHATE SERPL-MCNC: 3.4 MG/DL (ref 2.5–4.5)
PLATELET # BLD AUTO: 139 10*3/MM3 (ref 140–450)
PMV BLD AUTO: 11.2 FL (ref 6–12)
POTASSIUM SERPL-SCNC: 2.8 MMOL/L (ref 3.5–5.2)
PROT SERPL-MCNC: 6.3 G/DL (ref 6–8.5)
QT INTERVAL: 376 MS
QTC INTERVAL: 492 MS
RBC # BLD AUTO: 3.51 10*6/MM3 (ref 3.77–5.28)
SODIUM SERPL-SCNC: 139 MMOL/L (ref 136–145)
WBC NRBC COR # BLD AUTO: 2.37 10*3/MM3 (ref 3.4–10.8)

## 2025-03-28 PROCEDURE — 84100 ASSAY OF PHOSPHORUS: CPT | Performed by: INTERNAL MEDICINE

## 2025-03-28 PROCEDURE — 25010000002 THIAMINE HCL 200 MG/2ML SOLUTION: Performed by: INTERNAL MEDICINE

## 2025-03-28 PROCEDURE — 25010000002 MIDAZOLAM PER 1 MG: Performed by: INTERNAL MEDICINE

## 2025-03-28 PROCEDURE — 85025 COMPLETE CBC W/AUTO DIFF WBC: CPT | Performed by: INTERNAL MEDICINE

## 2025-03-28 PROCEDURE — 25010000002 KETOROLAC TROMETHAMINE PER 15 MG: Performed by: PHYSICIAN ASSISTANT

## 2025-03-28 PROCEDURE — 96376 TX/PRO/DX INJ SAME DRUG ADON: CPT

## 2025-03-28 PROCEDURE — 99232 SBSQ HOSP IP/OBS MODERATE 35: CPT | Performed by: INTERNAL MEDICINE

## 2025-03-28 PROCEDURE — 80074 ACUTE HEPATITIS PANEL: CPT | Performed by: INTERNAL MEDICINE

## 2025-03-28 PROCEDURE — 80053 COMPREHEN METABOLIC PANEL: CPT | Performed by: INTERNAL MEDICINE

## 2025-03-28 PROCEDURE — 83735 ASSAY OF MAGNESIUM: CPT | Performed by: INTERNAL MEDICINE

## 2025-03-28 PROCEDURE — 96375 TX/PRO/DX INJ NEW DRUG ADDON: CPT

## 2025-03-28 PROCEDURE — 25010000002 ONDANSETRON PER 1 MG: Performed by: INTERNAL MEDICINE

## 2025-03-28 RX ORDER — KETOROLAC TROMETHAMINE 15 MG/ML
15 INJECTION, SOLUTION INTRAMUSCULAR; INTRAVENOUS ONCE
Status: COMPLETED | OUTPATIENT
Start: 2025-03-28 | End: 2025-03-28

## 2025-03-28 RX ADMIN — LORAZEPAM 2 MG: 1 TABLET ORAL at 08:34

## 2025-03-28 RX ADMIN — THIAMINE HYDROCHLORIDE 200 MG: 100 INJECTION, SOLUTION INTRAMUSCULAR; INTRAVENOUS at 14:38

## 2025-03-28 RX ADMIN — FOLIC ACID 1 MG: 1 TABLET ORAL at 08:33

## 2025-03-28 RX ADMIN — PANTOPRAZOLE SODIUM 40 MG: 40 TABLET, DELAYED RELEASE ORAL at 05:07

## 2025-03-28 RX ADMIN — LORAZEPAM 1 MG: 1 TABLET ORAL at 21:16

## 2025-03-28 RX ADMIN — LORAZEPAM 2 MG: 1 TABLET ORAL at 05:07

## 2025-03-28 RX ADMIN — KETOROLAC TROMETHAMINE 15 MG: 15 INJECTION, SOLUTION INTRAMUSCULAR; INTRAVENOUS at 21:10

## 2025-03-28 RX ADMIN — MIDAZOLAM HYDROCHLORIDE 2 MG: 1 INJECTION, SOLUTION INTRAMUSCULAR; INTRAVENOUS at 06:00

## 2025-03-28 RX ADMIN — MIDAZOLAM HYDROCHLORIDE 2 MG: 1 INJECTION, SOLUTION INTRAMUSCULAR; INTRAVENOUS at 04:26

## 2025-03-28 RX ADMIN — Medication 1 TABLET: at 08:33

## 2025-03-28 RX ADMIN — ONDANSETRON 4 MG: 2 INJECTION INTRAMUSCULAR; INTRAVENOUS at 01:14

## 2025-03-28 RX ADMIN — MIDAZOLAM HYDROCHLORIDE 2 MG: 1 INJECTION, SOLUTION INTRAMUSCULAR; INTRAVENOUS at 19:46

## 2025-03-28 RX ADMIN — LORAZEPAM 2 MG: 1 TABLET ORAL at 03:11

## 2025-03-28 RX ADMIN — LORAZEPAM 2 MG: 1 TABLET ORAL at 18:01

## 2025-03-28 RX ADMIN — LORAZEPAM 2 MG: 1 TABLET ORAL at 11:58

## 2025-03-28 RX ADMIN — LORAZEPAM 1 MG: 1 TABLET ORAL at 23:00

## 2025-03-28 RX ADMIN — THIAMINE HYDROCHLORIDE 200 MG: 100 INJECTION, SOLUTION INTRAMUSCULAR; INTRAVENOUS at 05:08

## 2025-03-28 RX ADMIN — LORAZEPAM 1 MG: 1 TABLET ORAL at 01:07

## 2025-03-28 RX ADMIN — MIDAZOLAM HYDROCHLORIDE 2 MG: 1 INJECTION, SOLUTION INTRAMUSCULAR; INTRAVENOUS at 14:38

## 2025-03-28 RX ADMIN — MIDAZOLAM HYDROCHLORIDE 2 MG: 1 INJECTION, SOLUTION INTRAMUSCULAR; INTRAVENOUS at 00:10

## 2025-03-28 RX ADMIN — Medication 10 ML: at 10:03

## 2025-03-28 RX ADMIN — MIDAZOLAM HYDROCHLORIDE 2 MG: 1 INJECTION, SOLUTION INTRAMUSCULAR; INTRAVENOUS at 10:02

## 2025-03-28 RX ADMIN — MIDAZOLAM HYDROCHLORIDE 4 MG: 1 INJECTION, SOLUTION INTRAMUSCULAR; INTRAVENOUS at 02:11

## 2025-03-28 RX ADMIN — THIAMINE HYDROCHLORIDE 200 MG: 100 INJECTION, SOLUTION INTRAMUSCULAR; INTRAVENOUS at 21:10

## 2025-03-28 NOTE — H&P
"    [facility] Medicine Services  HISTORY AND PHYSICAL    Patient Name: Salina De La Fuente  : 1995  MRN: [id number]  Primary Care Physician: Loy Costa PA-C  Date of admission: 3/27/2025      Subjective   Subjective     Chief Complaint:  Tachycardia, tremors, alcohol use    HPI:  Salina De La Fuente is a 29 y.o. female who states that she had a virtual/telehealth PCP appointment last week at which time she told her provider that she had resumed drinking alcohol.  She states that for approximately the last year she \"off and on\" has averaged half of a fifth of alcohol daily, noting that she sometimes drinks more when she has withdrawal symptoms.  She states that she was told by the provider that when she wanted to stop drinking she should go to the [place] and she would be admitted there for detoxification.  She states her last drink of alcohol was 9 PM last night (Wednesday 3/26) and over the last 24 hours she has noticed frequent extremity tremors \"which can get bad,\" tachycardia, some nausea with emesis (no vandana blood), diaphoresis, and occasional shortness of breath.  She denies any auditory or visual hallucinations.  She states this is consistent with previous withdrawal symptoms that she has had.  Treatment was initiated at [facility] and she was then transferred here.  She states she is not able to pursue longer-term rehabilitation at an inpatient facility at this time.  She denies chest pain, abdominal pain, bowel habit change, slurred speech/facial droop, dizziness/lightheadedness, focal weakness, visual changes, or syncope.  She states her medical history is significant for anxiety, alcohol abuse, and fatty liver disease.  She takes no home medications at this time.      Personal History     Past Medical History:   Diagnosis Date    Anxiety     as a teenager    Asthma     never actually diagnosed    Eczema of both hands     History of gestational diabetes " mellitus     2016    Kidney stone 01/2018    Nexplanon in place            History reviewed. No pertinent surgical history.    Family History: family history includes Breast cancer in her paternal grandmother; Cervical cancer in her paternal grandmother; Diabetes in her paternal grandmother; Stroke in her father; Thyroid disease in her mother; Uterine cancer in her paternal grandmother.     Social History:  reports that she has never smoked. She has never used smokeless tobacco. She reports current alcohol use. She reports that she does not use drugs.  Social History     Social History Narrative    Not on file       Medications:  Available home medication information reviewed.  Etonogestrel, loratadine, and pantoprazole    Allergies   Allergen Reactions    Corticosteroids Hives     With liquid version, has safely taken pill form    Prednisone Hives     Liquid prednisone       Objective   Objective     Vital Signs:   Temp:  [98.3 °F (36.8 °C)-98.7 °F (37.1 °C)] 98.7 °F (37.1 °C)  Heart Rate:  [100-125] 118  Resp:  [18-22] 22  BP: (127-156)/() 142/104       Physical Exam   Constitutional: Awake, alert, NAD, pleasant  Eyes: PERRLA, sclerae anicteric, no conjunctival injection  HENT: NCAT, mucous membranes moist  Neck: Supple, no thyromegaly, no lymphadenopathy, trachea midline  Respiratory: Clear to auscultation bilaterally, nonlabored respirations   Cardiovascular: Tachycardic with rate 110 on my exam, regular rhythm, no murmurs, rubs, or gallops, palpable pedal pulses bilaterally  Gastrointestinal: Positive bowel sounds, soft, nontender, nondistended  Musculoskeletal: No bilateral ankle edema, no clubbing or cyanosis to extremities  Psychiatric: Appropriate affect, cooperative  Neurologic: Oriented x 3, strength symmetric in all extremities, Cranial Nerves grossly intact to confrontation, speech clear.  No tremors observed during my visit/exam.  Skin: No rashes, normal turgor.    Result Review:  I have  personally reviewed the results from the time of this admission to 3/27/2025 22:09 EDT and agree with these findings:  [x]  Laboratory list / accordion  []  Microbiology  []  Radiology  [x]  EKG/Telemetry   []  Cardiology/Vascular   []  Pathology  [x]  Old records  []  Other:  Most notable findings include: I reviewed EKG which by my read shows sinus tachycardia with ventricular rate just over 100 bpm, normal axis, nonspecific ST/T wave changes but no acute appearing ST elevation.      LAB RESULTS:      Lab 03/27/25  1405   WBC 3.57   HEMOGLOBIN 13.9   HEMATOCRIT 40.8   PLATELETS 173   NEUTROS ABS 2.11   IMMATURE GRANS (ABS) 0.02   LYMPHS ABS 0.90   MONOS ABS 0.52   EOS ABS 0.00   MCV 96.7         Lab 03/27/25  1405   SODIUM 139   POTASSIUM 2.9*   CHLORIDE 93*   CO2 23.4   ANION GAP 22.6*   BUN 5*   CREATININE 0.53*   EGFR 127.0   GLUCOSE 122*   CALCIUM 9.6   MAGNESIUM 1.5*         Lab 03/27/25  1405   TOTAL PROTEIN 7.8   ALBUMIN 4.5   GLOBULIN 3.3   ALT (SGPT) 264*   AST (SGOT) 393*   BILIRUBIN 1.3*   ALK PHOS 87                     UA          3/27/2025    15:43   Urinalysis   Squamous Epithelial Cells, UA 0-2    Specific [place], UA 1.015    Ketones, UA 15 mg/dL (1+)    Blood, UA Negative    Leukocytes, UA Negative    Nitrite, UA Positive    RBC, UA 0-2    WBC, UA 3-5    Bacteria, UA 4+        Microbiology Results (last 10 days)       ** No results found for the last 240 hours. **            No radiology results from the last 24 hrs        Assessment & Plan   Assessment & Plan       Alcohol intoxication      29F with alcohol withdrawal    Alcohol withdrawal  - CIWA protocol with scheduled/as needed oral/IV Ativan/Versed.  - Daily thiamine, multivitamin, folic acid.  - IVF with 0.9 NS.  - Nausea control with IV Zofran as needed.  - Declines case management visit to arrange longer-term rehabilitation.  - She states she has appoint with counselor already arranged as outpatient.    Anxiety  - She states that she  takes no home medications at this time.  - She is on CIWA protocol with oral/IV and scheduled/as needed Ativan/Versed.    Fatty liver disease  - Monitored as outpatient.  - LFTs still elevated, hepatocellular pattern, will check hepatitis panel.  - Will use ibuprofen for now instead of acetaminophen for any mild pain.          Total time spent: 75 minutes  Time spent includes time reviewing chart, face-to-face time, counseling patient/family/caregiver, ordering medications/tests/procedures, communicating with other health care professionals, documenting clinical information in the electronic health record, and coordination of care.     VTE Prophylaxis:  Mechanical VTE prophylaxis orders are present.          CODE STATUS: Full  Code Status and Medical Interventions: CPR (Attempt to Resuscitate); Full Support   Ordered at: 03/27/25 2137     Code Status (Patient has no pulse and is not breathing):    CPR (Attempt to Resuscitate)     Medical Interventions (Patient has pulse or is breathing):    Full Support     Level Of Support Discussed With:    Patient       Expected Discharge   esther Carmona,III, DO  03/27/25

## 2025-03-28 NOTE — CASE MANAGEMENT/SOCIAL WORK
Continued Stay Note  [facility]     Patient Name: Salina De La Fuente  MRN: [id number]  Today's Date: 3/28/2025    Admit Date: 3/27/2025    Plan: Home   Discharge Plan       Row Name 03/28/25 1136       Plan    Plan Home    Patient/Family in Agreement with Plan yes    Plan Comments CM spoke with patient and mother at bedside. Patient denies any discharge needs at this time. Patient states she is independent with all ADLs. Lives in [facility] [facility] with her  and children ages 4, 6, 9 yr old twins, & 13. Goal is home. PCP: Dr. Loy Costa. Pharmacy: [facility]. Verified primary insurance as [employer]. Family will transport patient at discharge when medically ready. Patient denies any environmental, finacial, resource, or transport needs at this time. CM following    Final Discharge Disposition Code 01 - home or self-care                   Discharge Codes    No documentation.                 Expected Discharge Date and Time       Expected Discharge Date Expected Discharge Time    Mar 30, 2025               Tena Helton RN

## 2025-03-28 NOTE — PROGRESS NOTES
[facility] Medicine Services  PROGRESS NOTE    Patient Name: Salina De La Fuente  : 1995  MRN: [id number]    Date of Admission: 3/27/2025  Primary Care Physician: Loy Costa PA-C    Subjective   Subjective     CC:  Withdrawal symptoms    HPI:  Resting in bed in no acute distress but still complains of nervousness and tremors.  No fever or chills.  No chest pain palpitation shortness of breath.  No nausea vomiting or diarrhea or abdominal pain.      Objective   Objective     Vital Signs:   Temp:  [98 °F (36.7 °C)-98.8 °F (37.1 °C)] 98.8 °F (37.1 °C)  Heart Rate:  [] 106  Resp:  [18-22] 18  BP: (127-150)/() 132/94     Physical Exam:  Constitutional: No acute distress, awake, alert  HENT: NCAT, mucous membranes moist  Respiratory: Clear to auscultation bilaterally, respiratory effort normal   Cardiovascular: RRR, no murmurs, rubs, or gallops  Gastrointestinal: Positive bowel sounds, soft, nontender, nondistended  Musculoskeletal: No bilateral ankle edema  Psychiatric: Appropriate affect, cooperative  Neurologic: Oriented x 3,  speech clear  Skin: No rashes     Results Reviewed:  LAB RESULTS:      Lab 25  0346 25  1405   WBC 2.37* 3.57   HEMOGLOBIN 11.7* 13.9   HEMATOCRIT 35.1 40.8   PLATELETS 139* 173   NEUTROS ABS 0.88* 2.11   IMMATURE GRANS (ABS) 0.01 0.02   LYMPHS ABS 1.14 0.90   MONOS ABS 0.28 0.52   EOS ABS 0.03 0.00   .0* 96.7         Lab 25  0346 25  1405   SODIUM 139 139   POTASSIUM 2.8* 2.9*   CHLORIDE 99 93*   CO2 23.0 23.4   ANION GAP 17.0* 22.6*   BUN 5* 5*   CREATININE 0.46* 0.53*   EGFR 131.4 127.0   GLUCOSE 101* 122*   CALCIUM 8.6 9.6   MAGNESIUM 1.9 1.5*   PHOSPHORUS 3.4  --          Lab 25  0346 25  1405   TOTAL PROTEIN 6.3 7.8   ALBUMIN 3.7 4.5   GLOBULIN 2.6 3.3   ALT (SGPT) 214* 264*   AST (SGOT) 474* 393*   BILIRUBIN 1.1 1.3*   ALK PHOS 80 87                     Brief Urine Lab Results  (Last  result in the past 365 days)        Color   Clarity   Blood   Leuk Est   Nitrite   Protein   CREAT   Urine HCG        03/27/25 1543 Yellow   Clear   Negative   Negative   Positive   Trace                   Microbiology Results Abnormal       None            No radiology results from the last 24 hrs        Current medications:  Scheduled Meds:folic acid, 1 mg, Oral, Daily  LORazepam, 2 mg, Oral, Q6H   Followed by  LORazepam, 1 mg, Oral, Q6H   Followed by  [START ON 3/29/2025] LORazepam, 1 mg, Oral, Q12H   Followed by  [START ON 3/31/2025] LORazepam, 1 mg, Oral, Daily  multivitamin with minerals, 1 tablet, Oral, Daily  pantoprazole, 40 mg, Oral, Daily  potassium chloride, 40 mEq, Oral, Once  sodium chloride, 10 mL, Intravenous, Q12H  thiamine (B-1) IV, 200 mg, Intravenous, Q8H   Followed by  [START ON 4/2/2025] thiamine, 100 mg, Oral, Daily      Continuous Infusions:sodium chloride, 125 mL/hr, Last Rate: 125 mL/hr (03/28/25 0556)      PRN Meds:.  Calcium Replacement - Follow Nurse / BPA Driven Protocol    HYDROcodone-acetaminophen    ibuprofen    LORazepam **OR** midazolam **OR** LORazepam **OR** midazolam **OR** midazolam **OR** midazolam    Magnesium Standard Dose Replacement - Follow Nurse / BPA Driven Protocol    melatonin    Morphine **AND** naloxone    nitroglycerin    ondansetron    Phosphorus Replacement - Follow Nurse / BPA Driven Protocol    Potassium Replacement - Follow Nurse / BPA Driven Protocol    sodium chloride    sodium chloride    sodium chloride    Assessment & Plan   Assessment & Plan     Active Hospital Problems    Diagnosis  POA    **Alcohol intoxication [F10.929]  Yes      Resolved Hospital Problems   No resolved problems to display.        Brief Hospital Course to date:  Salina De La Fuente is a 29 y.o. female with past medical history significant for history of anxiety and history of gestational diabetes mellitus.  Patient was admitted for alcohol withdrawal symptoms    Alcohol  withdrawal  - CIWA protocol with scheduled/as needed oral/IV Ativan/Versed.  - Daily thiamine, multivitamin, folic acid.  - IVF with 0.9 NS.  - Nausea control with IV Zofran as needed.  - Declines case management visit to arrange longer-term rehabilitation.  - She states she has appoint with counselor already arranged as outpatient.     Anxiety  - She states that she takes no home medications at this time.     Fatty liver disease  - Monitored as outpatient.  - LFTs still elevated, hepatocellular pattern, will check hepatitis panel.    Leukopenia, thrombocytopenia, elevated MCV  -Will monitor.  -Will check B12 and folate            Expected Discharge Location and Transportation: Home  Expected Discharge in 2 to 3 days  Expected Discharge Date: 3/30/2025; Expected Discharge Time:      VTE Prophylaxis:  Mechanical VTE prophylaxis orders are present.         AM-PAC 6 Clicks Score (PT): 24 (03/28/25 0803)    CODE STATUS:   Code Status and Medical Interventions: CPR (Attempt to Resuscitate); Full Support   Ordered at: 03/27/25 2034     Code Status (Patient has no pulse and is not breathing):    CPR (Attempt to Resuscitate)     Medical Interventions (Patient has pulse or is breathing):    Full Support     Level Of Support Discussed With:    Patient       Chris Alberto MD  03/28/25

## 2025-03-28 NOTE — PAYOR COMM NOTE
"Clara De La Fuente (29 y.o. Female)           Date of Birth   1995    Social Security Number       Address   [address] [address]    Home Phone   [phone]    MRN   [id number]       Pentecostal   None    Marital Status                               Admission Date   3/27/2025    Admission Type   Emergency    Admitting Provider   Chris Alberto MD    Attending Provider   Chris Alberto MD    Department, Room/Bed   [facility] 5G, S548/1       Discharge Date       Discharge Disposition       Discharge Destination                                 Attending Provider: Chris Alberto MD    Allergies: Corticosteroids, Prednisone    Isolation: None   Infection: None   Code Status: CPR    Ht: 162.6 cm (64\")   Wt: 80.3 kg (177 lb)    Admission Cmt: None   Principal Problem: Alcohol intoxication [F10.929]                   Active Insurance as of 3/27/2025       Primary Coverage       Payor Plan Insurance Group Employer/Plan Group    AET [employer] [place] AET [employer] [facility]        Payor Plan Address Payor Plan Phone Number Payor Plan Fax Number Effective Dates    [address]   2015 - None Entered    [facility] [id number]         Subscriber Name Subscriber Birth Date Member ID       CLARA DE LA FUENTE 1995 [id number]                     Emergency Contacts        (Rel.) Home Phone Work Phone Mobile Phone    Wallace De La Fuente (Spouse) [phone] -- [phone]    Mercy Gates (Mother) [phone] -- [phone]                 History & Physical        Jose Carmona III, DO at 25 [phone]              [facility] Medicine Services  HISTORY AND PHYSICAL    Patient Name: Clara De La Fuente  : 1995  MRN: [id number]  Primary Care Physician: Loy Csota PA-C  Date of admission: 3/27/2025      Subjective  Subjective     Chief Complaint:  Tachycardia, tremors, alcohol use    HPI:  Clara" "Misha De La Fuente is a 29 y.o. female who states that she had a virtual/telehealth PCP appointment last week at which time she told her provider that she had resumed drinking alcohol.  She states that for approximately the last year she \"off and on\" has averaged half of a fifth of alcohol daily, noting that she sometimes drinks more when she has withdrawal symptoms.  She states that she was told by the provider that when she wanted to stop drinking she should go to the [place] [facility] and she would be admitted there for detoxification.  She states her last drink of alcohol was 9 PM last night (Wednesday 3/26) and over the last 24 hours she has noticed frequent extremity tremors \"which can get bad,\" tachycardia, some nausea with emesis (no vandana blood), diaphoresis, and occasional shortness of breath.  She denies any auditory or visual hallucinations.  She states this is consistent with previous withdrawal symptoms that she has had.  Treatment was initiated at [place] and she was then transferred here.  She states she is not able to pursue longer-term rehabilitation at an inpatient facility at this time.  She denies chest pain, abdominal pain, bowel habit change, slurred speech/facial droop, dizziness/lightheadedness, focal weakness, visual changes, or syncope.  She states her medical history is significant for anxiety, alcohol abuse, and fatty liver disease.  She takes no home medications at this time.      Personal History     Past Medical History:   Diagnosis Date    Anxiety     as a teenager    Asthma     never actually diagnosed    Eczema of both hands     History of gestational diabetes mellitus     2016    Kidney stone 01/2018    Nexplanon in place            History reviewed. No pertinent surgical history.    Family History: family history includes Breast cancer in her paternal grandmother; Cervical cancer in her paternal grandmother; Diabetes in her paternal grandmother; Stroke in her father; Thyroid disease in her " mother; Uterine cancer in her paternal grandmother.     Social History:  reports that she has never smoked. She has never used smokeless tobacco. She reports current alcohol use. She reports that she does not use drugs.  Social History     Social History Narrative    Not on file       Medications:  Available home medication information reviewed.  Etonogestrel, loratadine, and pantoprazole    Allergies   Allergen Reactions    Corticosteroids Hives     With liquid version, has safely taken pill form    Prednisone Hives     Liquid prednisone       Objective  Objective     Vital Signs:   Temp:  [98.3 °F (36.8 °C)-98.7 °F (37.1 °C)] 98.7 °F (37.1 °C)  Heart Rate:  [100-125] 118  Resp:  [18-22] 22  BP: (127-156)/() 142/104       Physical Exam   Constitutional: Awake, alert, NAD, pleasant  Eyes: PERRLA, sclerae anicteric, no conjunctival injection  HENT: NCAT, mucous membranes moist  Neck: Supple, no thyromegaly, no lymphadenopathy, trachea midline  Respiratory: Clear to auscultation bilaterally, nonlabored respirations   Cardiovascular: Tachycardic with rate 110 on my exam, regular rhythm, no murmurs, rubs, or gallops, palpable pedal pulses bilaterally  Gastrointestinal: Positive bowel sounds, soft, nontender, nondistended  Musculoskeletal: No bilateral ankle edema, no clubbing or cyanosis to extremities  Psychiatric: Appropriate affect, cooperative  Neurologic: Oriented x 3, strength symmetric in all extremities, Cranial Nerves grossly intact to confrontation, speech clear.  No tremors observed during my visit/exam.  Skin: No rashes, normal turgor.    Result Review:  I have personally reviewed the results from the time of this admission to 3/27/2025 22:09 EDT and agree with these findings:  [x]  Laboratory list / accordion  []  Microbiology  []  Radiology  [x]  EKG/Telemetry   []  Cardiology/Vascular   []  Pathology  [x]  Old records  []  Other:  Most notable findings include: I reviewed EKG which by my read shows  sinus tachycardia with ventricular rate just over 100 bpm, normal axis, nonspecific ST/T wave changes but no acute appearing ST elevation.      LAB RESULTS:      Lab 03/27/25  1405   WBC 3.57   HEMOGLOBIN 13.9   HEMATOCRIT 40.8   PLATELETS 173   NEUTROS ABS 2.11   IMMATURE GRANS (ABS) 0.02   LYMPHS ABS 0.90   MONOS ABS 0.52   EOS ABS 0.00   MCV 96.7         Lab 03/27/25  1405   SODIUM 139   POTASSIUM 2.9*   CHLORIDE 93*   CO2 23.4   ANION GAP 22.6*   BUN 5*   CREATININE 0.53*   EGFR 127.0   GLUCOSE 122*   CALCIUM 9.6   MAGNESIUM 1.5*         Lab 03/27/25  1405   TOTAL PROTEIN 7.8   ALBUMIN 4.5   GLOBULIN 3.3   ALT (SGPT) 264*   AST (SGOT) 393*   BILIRUBIN 1.3*   ALK PHOS 87                     UA          3/27/2025    15:43   Urinalysis   Squamous Epithelial Cells, UA 0-2    Specific [place], UA 1.015    Ketones, UA 15 mg/dL (1+)    Blood, UA Negative    Leukocytes, UA Negative    Nitrite, UA Positive    RBC, UA 0-2    WBC, UA 3-5    Bacteria, UA 4+        Microbiology Results (last 10 days)       ** No results found for the last 240 hours. **            No radiology results from the last 24 hrs        Assessment & Plan  Assessment & Plan       Alcohol intoxication      29F with alcohol withdrawal    Alcohol withdrawal  - CIWA protocol with scheduled/as needed oral/IV Ativan/Versed.  - Daily thiamine, multivitamin, folic acid.  - IVF with 0.9 NS.  - Nausea control with IV Zofran as needed.  - Declines case management visit to arrange longer-term rehabilitation.  - She states she has appoint with counselor already arranged as outpatient.    Anxiety  - She states that she takes no home medications at this time.  - She is on CIWA protocol with oral/IV and scheduled/as needed Ativan/Versed.    Fatty liver disease  - Monitored as outpatient.  - LFTs still elevated, hepatocellular pattern, will check hepatitis panel.  - Will use ibuprofen for now instead of acetaminophen for any mild pain.          Total time spent: 75  minutes  Time spent includes time reviewing chart, face-to-face time, counseling patient/family/caregiver, ordering medications/tests/procedures, communicating with other health care professionals, documenting clinical information in the electronic health record, and coordination of care.     VTE Prophylaxis:  Mechanical VTE prophylaxis orders are present.          CODE STATUS: Full  Code Status and Medical Interventions: CPR (Attempt to Resuscitate); Full Support   Ordered at: 03/27/25 2137     Code Status (Patient has no pulse and is not breathing):    CPR (Attempt to Resuscitate)     Medical Interventions (Patient has pulse or is breathing):    Full Support     Level Of Support Discussed With:    Patient       Expected Discharge   tbd      Jose Carmona III, DO  03/27/25     Electronically signed by Jose Carmona III, DO at 03/27/25 2166          Emergency Department Notes        Luna Serna RN at 03/27/25 1801           [facility]    Nursing Report ED to Floor:  Mental status: AOX4  Ambulatory status: 1X ASSISTANCE  Oxygen Therapy:  RA  Cardiac Rhythm: TACHTY  Admitted from: HOME/ER  Safety Concerns:  SEIZURE PRECAUTIONS IN PLACE    Precautions: SEIZURE PADS IN PLACE  Social Issues: MOM AT BEDSIDE  ED Room #:  [facility] 8    ED Nurse Phone Extension - [phone] or may call [phone].      HPI:   Chief Complaint   Patient presents with    Alcohol Problem       Past Medical History:  Past Medical History:   Diagnosis Date    Anxiety     as a teenager    Asthma     never actually diagnosed    Eczema of both hands     History of gestational diabetes mellitus     2016    Kidney stone 01/2018    Nexplanon in place         Past Surgical History:  History reviewed. No pertinent surgical history.     Admitting Doctor:   Jamie Varghese MD    Consulting Provider(s):  Consults       No orders found from 2/26/2025 to 3/28/2025.             Admitting Diagnosis:   The encounter diagnosis  was Alcohol dependence with uncomplicated withdrawal.    Most Recent Vitals:   Vitals:    03/27/25 1500 03/27/25 1530 03/27/25 1600 03/27/25 1800   BP: 141/94 128/96 138/99 130/84   BP Location:       Patient Position:       Pulse: 109 100 104 118   Resp:       Temp:       TempSrc:       SpO2: 98% 97% 99% 100%   Weight:       Height:           Active LDAs/IV Access:   Lines, Drains & Airways       Active LDAs       Name Placement date Placement time Site Days    Peripheral IV 03/27/25 1424 Right Antecubital 03/27/25  1424  Antecubital  less than 1                    Labs (abnormal labs have a star):   Labs Reviewed   CBC WITH AUTO DIFFERENTIAL - Abnormal; Notable for the following components:       Result Value    RDW 15.6 (*)     RDW-SD 55.7 (*)     Monocyte % 14.6 (*)     Eosinophil % 0.0 (*)     Immature Grans % 0.6 (*)     All other components within normal limits   COMPREHENSIVE METABOLIC PANEL - Abnormal; Notable for the following components:    Glucose 122 (*)     BUN 5 (*)     Creatinine 0.53 (*)     Potassium 2.9 (*)     Chloride 93 (*)     ALT (SGPT) 264 (*)     AST (SGOT) 393 (*)     Total Bilirubin 1.3 (*)     Anion Gap 22.6 (*)     All other components within normal limits    Narrative:     GFR Categories in Chronic Kidney Disease (CKD)      GFR Category          GFR (mL/min/1.73)    Interpretation  G1                     90 or greater         Normal or high (1)  G2                      60-89                Mild decrease (1)  G3a                   45-59                Mild to moderate decrease  G3b                   30-44                Moderate to severe decrease  G4                    15-29                Severe decrease  G5                    14 or less           Kidney failure          (1)In the absence of evidence of kidney disease, neither GFR category G1 or G2 fulfill the criteria for CKD.    eGFR calculation 2021 CKD-EPI creatinine equation, which does not include race as a factor   URINALYSIS  W/ MICROSCOPIC IF INDICATED (NO CULTURE) - Abnormal; Notable for the following components:    pH, UA 8.5 (*)     Ketones, UA 15 mg/dL (1+) (*)     Protein, UA Trace (*)     Nitrite, UA Positive (*)     All other components within normal limits   MAGNESIUM - Abnormal; Notable for the following components:    Magnesium 1.5 (*)     All other components within normal limits   URINALYSIS, MICROSCOPIC ONLY - Abnormal; Notable for the following components:    WBC, UA 3-5 (*)     Bacteria, UA 4+ (*)     All other components within normal limits   ETHANOL - Normal    Narrative:     Elevated lactic acid concentration and lactate dehydrogenase(LD) activity may falsely elevate enzymatically determined ethanol levels. Not for legal purposes.    URINE DRUG SCREEN - Normal    Narrative:     Cutoff For Drugs Screened:    Amphetamines               500 ng/ml  Barbiturates               200 ng/ml  Benzodiazepines            150 ng/ml  Cocaine                    150 ng/ml  Methadone                  200 ng/ml  Opiates                    100 ng/ml  Phencyclidine               25 ng/ml  THC                         50 ng/ml  Methamphetamine            500 ng/ml  Tricyclic Antidepressants  300 ng/ml  Oxycodone                  100 ng/ml  Buprenorphine               10 ng/ml    The normal value for all drugs tested is negative. This report includes unconfirmed screening results, with the cutoff values listed, to be used for medical treatment purposes only.  Unconfirmed results must not be used for non-medical purposes such as employment or legal testing.  Clinical consideration should be applied to any drug of abuse test, particularly when unconfirmed results are used.     ACETAMINOPHEN LEVEL - Normal   SALICYLATE LEVEL - Normal   FENTANYL, URINE - Normal    Narrative:     Negative Threshold:      Fentanyl 5 ng/mL     The normal value for the drug tested is negative. This report includes final unconfirmed screening results to be used for  medical treatment purposes only. Unconfirmed results must not be used for non-medical purposes such as employment or legal testing. Clinical consideration should be applied to any drug of abuse test, particularly when unconfirmed results are used.          RAINBOW DRAW    Narrative:     The following orders were created for panel order [place] Draw.  Procedure                               Abnormality         Status                     ---------                               -----------         ------                     Green Top (Gel)[[id number]]                                  Final result               Lavender Top[[id number]]                                     Final result               Gold Top - SST[[id number]]                                   Final result               Gray Top[[id number]]                                         Final result               Light Blue Top[[id number]]                                   Final result                 Please view results for these tests on the individual orders.   HCG, QUANTITATIVE, PREGNANCY    Narrative:     HCG Ranges by Gestational Age    Females - non-pregnant premenopausal   </= 1mIU/mL HCG  Females - postmenopausal               </= 7mIU/mL HCG    3 Weeks         5.8 -    71.2 mIU/mL  4 Weeks         9.5 -     750 mIU/mL  5 Weeks         217 -   7,138 mIU/mL  6 Weeks         158 -  31,795 mIU/mL  7 Weeks       3,697 - 163,563 mIU/mL  8 Weeks      32,065 - 149,571 mIU/mL  9 Weeks      63,803 - 151,410 mIU/mL  10 Weeks     46,509 - 186,977 mIU/mL  12 Weeks     27,832 - 210,612 mIU/mL  14 Weeks     13,950 -  62,530 mIU/mL  15 Weeks     12,039 -  70,971 mIU/mL  16 Weeks      9,040 -  56,451 mIU/mL  17 Weeks      8,175 -  55,868 mIU/mL  18 Weeks      8,099 -  58,176 mIU/mL   GREEN TOP   LAVENDER TOP   GOLD TOP - SST   GRAY TOP   LIGHT BLUE TOP       Meds Given in ED:   Medications   sodium chloride 0.9 % flush 10 mL (has no administration in time range)   thiamine  (VITAMIN B-1) tablet 100 mg (100 mg Oral Given 3/27/25 1510)   Potassium Replacement - Follow Nurse / BPA Driven Protocol (has no administration in time range)   potassium chloride (KLOR-CON) packet 40 mEq (40 mEq Oral Not Given 3/27/25 1801)   potassium chloride 10 mEq in 100 mL IVPB (0 mEq Intravenous Stopped 3/27/25 1804)   diazePAM (VALIUM) tablet 2.5 mg (2.5 mg Oral Given 3/27/25 1429)   ondansetron (ZOFRAN) injection 4 mg (4 mg Intravenous Given 3/27/25 1510)   dicyclomine (BENTYL) capsule 20 mg (20 mg Oral Given 3/27/25 1510)   potassium chloride (KLOR-CON) packet 40 mEq (40 mEq Oral Given 3/27/25 1616)   magnesium sulfate 2g/50 mL (PREMIX) infusion (0 g Intravenous Stopped 3/27/25 1708)   sodium chloride 0.9 % bolus 1,000 mL (0 mL Intravenous Stopped 3/27/25 1804)   LORazepam (ATIVAN) injection 1 mg (1 mg Intravenous Given 3/27/25 1647)   cefuroxime (CEFTIN) tablet 500 mg (500 mg Oral Given 3/27/25 1712)   ketorolac (TORADOL) injection 30 mg (30 mg Intravenous Given 3/27/25 1746)           Last NIH score:                                                          Dysphagia screening results:        [place] Coma Scale:  No data recorded     CIWA:        Restraint Type:            Isolation Status:  No active isolations          Electronically signed by Luna Serna RN at 03/27/25 1806       Mariaelena Clements PA-C at 03/27/25 1746       Attestation signed by Jamie Varghese MD at 03/28/25 8998          SUPERVISE: For this patient encounter, I reviewed the APC's documentation, treatment plan, and medical decision making.  Jamie Varghese MD 3/28/2025 07:31 EDT                             Subjective   History of Present Illness  Patient is a 29-year-old female who presents emergency department complaining of alcohol withdrawal.  Patient states that she drinks around half 1/5 of vodka every day.  States she has been doing this for a long period of time.  Patient reports that she was last sober in  December, states she was sober for about 2 weeks after going to an inpatient facility, then states that she relapsed, and withdrew at home on medications, states that she was sober for about 1 month.  Patient states that she consulted with her primary care physician several days ago and informed that she would like to detox again.  He informed her to come to the emergency room for further evaluation and possible admission to inpatient detox unit.  Patient states that her last drink was at 9 PM last night.  Her mother is accompanying her and states that she knows she has not drank anything since then.  Patient reports that she is very shaky, states that she is nauseous and has had several episodes of vomiting.  Patient denies pregnancy.  Patient reports that she has a history of anxiety, asthma, eczema.  She reports that she has a Nexplanon in her left arm.  Patient states that she has not taken any medications that been prescribed to her for the last 3 to 6 months    History provided by:  Patient   used: No        Review of Systems   Gastrointestinal:  Positive for nausea and vomiting.   Psychiatric/Behavioral:          Alcohol withdraw     All other systems reviewed and are negative.      Past Medical History:   Diagnosis Date    Anxiety     as a teenager    Asthma     never actually diagnosed    Eczema of both hands     History of gestational diabetes mellitus     2016    Kidney stone 01/2018    Nexplanon in place        Allergies   Allergen Reactions    Corticosteroids Hives     With liquid version, has safely taken pill form    Prednisone Hives     Liquid prednisone       History reviewed. No pertinent surgical history.    Family History   Problem Relation Age of Onset    Stroke Father     Thyroid disease Mother     Breast cancer Paternal Grandmother     Cervical cancer Paternal Grandmother     Diabetes Paternal Grandmother     Uterine cancer Paternal Grandmother     Ovarian cancer Neg Hx      Colon cancer Neg Hx        Social History     Socioeconomic History    Marital status:    Tobacco Use    Smoking status: Never    Smokeless tobacco: Never   Vaping Use    Vaping status: Never Used   Substance and Sexual Activity    Alcohol use: No    Drug use: No    Sexual activity: Yes     Partners: Male     Birth control/protection: Nexplanon           Objective   Physical Exam  Vitals and nursing note reviewed.   Constitutional:       General: She is in acute distress.      Appearance: She is diaphoretic.   HENT:      Head: Normocephalic.      Nose: Nose normal.   Eyes:      Pupils: Pupils are equal, round, and reactive to light.   Cardiovascular:      Rate and Rhythm: Regular rhythm. Tachycardia present.      Pulses: Normal pulses.   Pulmonary:      Effort: Pulmonary effort is normal.      Breath sounds: Normal breath sounds.   Abdominal:      General: Abdomen is flat.      Tenderness: There is no abdominal tenderness.   Musculoskeletal:         General: Normal range of motion.   Skin:     General: Skin is warm and dry.   Neurological:      General: No focal deficit present.      Mental Status: She is alert and oriented to person, place, and time.      Comments: CIWA 12     Psychiatric:         Thought Content: Thought content normal.         Judgment: Judgment normal.      Comments: Anxious           Procedures          ED Course  ED Course as of 03/27/25 1759   u Mar 27, 2025   1534 Magnesium(!): 1.5 [WB]   1534 Salicylate: <0.5 [WB]   1534 Glucose(!): 122 [WB]   1534 BUN(!): 5 [WB]   1534 Creatinine(!): 0.53 [WB]   1534 WBC: 3.57 [WB]   1534 Hemoglobin: 13.9 [WB]   1534 Hematocrit: 40.8 [WB]   1534 Acetaminophen: <5.0 [WB]   1534 Ethanol: <10 [WB]   1534 HCG Quantitative: <0.20 [WB]      ED Course User Index  [WB] Mariaelena Clements PA-C                                      WBC   Date Value Ref Range Status   03/27/2025 3.57 3.40 - 10.80 10*3/mm3 Final     RBC   Date Value Ref Range Status    03/27/2025 4.22 3.77 - 5.28 10*6/mm3 Final     Hemoglobin   Date Value Ref Range Status   03/27/2025 13.9 12.0 - 15.9 g/dL Final     Hematocrit   Date Value Ref Range Status   03/27/2025 40.8 34.0 - 46.6 % Final     MCV   Date Value Ref Range Status   03/27/2025 96.7 79.0 - 97.0 fL Final     MCH   Date Value Ref Range Status   03/27/2025 32.9 26.6 - 33.0 pg Final     MCHC   Date Value Ref Range Status   03/27/2025 34.1 31.5 - 35.7 g/dL Final     RDW   Date Value Ref Range Status   03/27/2025 15.6 (H) 12.3 - 15.4 % Final     RDW-SD   Date Value Ref Range Status   03/27/2025 55.7 (H) 37.0 - 54.0 fl Final     MPV   Date Value Ref Range Status   03/27/2025 10.5 6.0 - 12.0 fL Final     Platelets   Date Value Ref Range Status   03/27/2025 173 140 - 450 10*3/mm3 Final     Neutrophil %   Date Value Ref Range Status   03/27/2025 59.0 42.7 - 76.0 % Final     Lymphocyte %   Date Value Ref Range Status   03/27/2025 25.2 19.6 - 45.3 % Final     Monocyte %   Date Value Ref Range Status   03/27/2025 14.6 (H) 5.0 - 12.0 % Final     Eosinophil %   Date Value Ref Range Status   03/27/2025 0.0 (L) 0.3 - 6.2 % Final     Basophil %   Date Value Ref Range Status   03/27/2025 0.6 0.0 - 1.5 % Final     Immature Grans %   Date Value Ref Range Status   03/27/2025 0.6 (H) 0.0 - 0.5 % Final     Neutrophils, Absolute   Date Value Ref Range Status   03/27/2025 2.11 1.70 - 7.00 10*3/mm3 Final     Lymphocytes, Absolute   Date Value Ref Range Status   03/27/2025 0.90 0.70 - 3.10 10*3/mm3 Final     Monocytes, Absolute   Date Value Ref Range Status   03/27/2025 0.52 0.10 - 0.90 10*3/mm3 Final     Eosinophils, Absolute   Date Value Ref Range Status   03/27/2025 0.00 0.00 - 0.40 10*3/mm3 Final     Basophils, Absolute   Date Value Ref Range Status   03/27/2025 0.02 0.00 - 0.20 10*3/mm3 Final     Immature Grans, Absolute   Date Value Ref Range Status   03/27/2025 0.02 0.00 - 0.05 10*3/mm3 Final     Lab Results   Component Value Date    GLUCOSE 122 (H)  03/27/2025    BUN 5 (L) 03/27/2025    CREATININE 0.53 (L) 03/27/2025     03/27/2025    K 2.9 (L) 03/27/2025    CL 93 (L) 03/27/2025    CALCIUM 9.6 03/27/2025    PROTEINTOT 7.8 03/27/2025    ALBUMIN 4.5 03/27/2025     (H) 03/27/2025     (H) 03/27/2025    ALKPHOS 87 03/27/2025    BILITOT 1.3 (H) 03/27/2025    GLOB 3.3 03/27/2025    AGRATIO 1.4 03/27/2025    BCR 9.4 03/27/2025    ANIONGAP 22.6 (H) 03/27/2025    EGFR 127.0 03/27/2025         Medical Decision Making  29-year-old female who presents emergency room complaint of alcohol withdrawal.  Differential diagnosis includes alcohol withdrawal seizures, alcohol withdrawal, detox, delirium tremens, nausea vomiting.  Physical exam patient is tremulous and very anxious.  She is also tachycardic.  CIWA score is a 12 on initial evaluation.  Intervention includes p.o. Valium and IV Ativan.  Patient is also given potassium and magnesium after her labs returned showing that she has hypokalemia and hypomagnesemia.  EtOH was negative.  Drug screen was negative.  Other labs are nonactionable. Patient will be transferred to [facility] for alcohol withdraw      Amount and/or Complexity of Data Reviewed  Labs: ordered. Decision-making details documented in ED Course.  ECG/medicine tests: ordered.    Risk  OTC drugs.  Prescription drug management.        Final diagnoses:   Alcohol dependence with uncomplicated withdrawal       ED Disposition  ED Disposition       ED Disposition   Decision to Admit    Condition   --    Comment   Level of Care: Telemetry [5]   Accepting Provider:: KRISTY JORGENSEN [[id number]]   Admitting Physician: JAMIE PEREZ [[id number]]                 No follow-up provider specified.       Medication List      No changes were made to your prescriptions during this visit.           Electronically signed by Jamie Perez MD at 03/28/25 8529       Vital Signs (last day)       Date/Time Temp Temp src Pulse Resp BP Patient Position SpO2     03/28/25 1138 98.8 (37.1) Oral 106 18 132/94 Lying 96    03/28/25 0435 98.3 (36.8) Oral 96 18 144/98 Lying 96    03/28/25 0105 -- -- 100 -- 137/84 -- 96    03/27/25 2308 -- -- 113 -- 150/103 -- 98    03/27/25 2049 98 (36.7) Oral 111 18 150/108 -- 98    03/27/25 1954 98.7 (37.1) Oral 118 22 142/104 -- 100    03/27/25 1900 -- -- 115 -- 127/88 -- 99    03/27/25 1830 -- -- 115 -- 130/94 -- 98    03/27/25 1800 -- -- 118 -- 130/84 -- 100    03/27/25 1600 -- -- 104 -- 138/99 -- 99    03/27/25 1530 -- -- 100 -- 128/96 -- 97    03/27/25 1500 -- -- 109 -- 141/94 -- 98    03/27/25 1430 -- -- 110 -- 131/100 -- 98    03/27/25 1350 98.3 (36.8) Oral 125 18 156/109 Sitting 100          Oxygen Therapy (last day)       Date/Time SpO2 Device (Oxygen Therapy) Flow (L/min) (Oxygen Therapy) Oxygen Concentration (%) ETCO2 (mmHg)    03/28/25 1138 96 -- -- -- --    03/28/25 0803 -- room air -- -- --    03/28/25 0435 96 room air -- -- --    03/28/25 0105 96 -- -- -- --    03/27/25 2308 98 -- -- -- 31    03/27/25 2049 98 -- -- -- --    03/27/25 1954 100 room air -- -- --    03/27/25 1900 99 -- -- -- --    03/27/25 1830 98 -- -- -- --    03/27/25 1800 100 -- -- -- --    03/27/25 1600 99 -- -- -- --    03/27/25 1530 97 -- -- -- --    03/27/25 1500 98 -- -- -- --    03/27/25 1430 98 -- -- -- --    03/27/25 1350 100 room air -- -- --          Lines, Drains & Airways       Active LDAs       Name Placement date Placement time Site Days    Peripheral IV 03/27/25 1424 Right Antecubital 03/27/25  1424  Antecubital  less than 1                   Date/Time CIWA-Ar Total    03/28/25 0935 9     03/28/25 0803 14     03/28/25 0400 9     03/28/25 0300 12     03/28/25 0200 12     03/28/25 0100 10     03/28/25 0000 9     03/27/25 2200 0     03/27/25 2052 14     03/27/25 2000 --     03/27/25 19:59:41 6     03/27/25 19:07:15 8     03/27/25 17:00:16 12     03/27/25 14:24:22 19     03/27/25 13:53:09 18           Lab Results (last 24 hours)       Procedure  Component Value Units Date/Time    Comprehensive Metabolic Panel [[id number]]  (Abnormal) Collected: 03/28/25 0346    Specimen: Blood Updated: 03/28/25 0624     Glucose 101 mg/dL      BUN 5 mg/dL      Creatinine 0.46 mg/dL      Sodium 139 mmol/L      Potassium 2.8 mmol/L      Comment: Slight hemolysis detected by analyzer. Result may be falsely elevated.        Chloride 99 mmol/L      CO2 23.0 mmol/L      Calcium 8.6 mg/dL      Total Protein 6.3 g/dL      Albumin 3.7 g/dL      ALT (SGPT) 214 U/L      AST (SGOT) 474 U/L      Alkaline Phosphatase 80 U/L      Total Bilirubin 1.1 mg/dL      Globulin 2.6 gm/dL      Comment: Calculated Result        A/G Ratio 1.4 g/dL      BUN/Creatinine Ratio 10.9     Anion Gap 17.0 mmol/L      eGFR 131.4 mL/min/1.73     Narrative:      GFR Categories in Chronic Kidney Disease (CKD)      GFR Category          GFR (mL/min/1.73)    Interpretation  G1                     90 or greater         Normal or high (1)  G2                      60-89                Mild decrease (1)  G3a                   45-59                Mild to moderate decrease  G3b                   30-44                Moderate to severe decrease  G4                    15-29                Severe decrease  G5                    14 or less           Kidney failure          (1)In the absence of evidence of kidney disease, neither GFR category G1 or G2 fulfill the criteria for CKD.    eGFR calculation 2021 CKD-EPI creatinine equation, which does not include race as a factor    Magnesium [[id number]]  (Normal) Collected: 03/28/25 0346    Specimen: Blood Updated: 03/28/25 0624     Magnesium 1.9 mg/dL     Hepatitis Panel, Acute [[id number]]  (Normal) Collected: 03/28/25 0346    Specimen: Blood Updated: 03/28/25 0623     Hepatitis B Surface Ag Non-Reactive     Hep A IgM Non-Reactive     Hep B C IgM Non-Reactive     Hepatitis C Ab Non-Reactive    Narrative:      Results may be falsely decreased if patient taking Biotin.     Phosphorus  [[id number]]  (Normal) Collected: 03/28/25 0346    Specimen: Blood Updated: 03/28/25 0620     Phosphorus 3.4 mg/dL     CBC Auto Differential [[id number]]  (Abnormal) Collected: 03/28/25 0346    Specimen: Blood Updated: 03/28/25 0602     WBC 2.37 10*3/mm3      RBC 3.51 10*6/mm3      Hemoglobin 11.7 g/dL      Hematocrit 35.1 %      .0 fL      MCH 33.3 pg      MCHC 33.3 g/dL      RDW 16.0 %      RDW-SD 58.4 fl      MPV 11.2 fL      Platelets 139 10*3/mm3      Neutrophil % 37.1 %      Lymphocyte % 48.1 %      Monocyte % 11.8 %      Eosinophil % 1.3 %      Basophil % 1.3 %      Immature Grans % 0.4 %      Neutrophils, Absolute 0.88 10*3/mm3      Lymphocytes, Absolute 1.14 10*3/mm3      Monocytes, Absolute 0.28 10*3/mm3      Eosinophils, Absolute 0.03 10*3/mm3      Basophils, Absolute 0.03 10*3/mm3      Immature Grans, Absolute 0.01 10*3/mm3      nRBC 0.0 /100 WBC     Urinalysis, Microscopic Only - Urine, Clean Catch [[id number]]  (Abnormal) Collected: 03/27/25 1543    Specimen: Urine, Clean Catch Updated: 03/27/25 1639     RBC, UA 0-2 /HPF      WBC, UA 3-5 /HPF      Bacteria, UA 4+ /HPF      Squamous Epithelial Cells, UA 0-2 /HPF      Hyaline Casts, UA None Seen /LPF      Methodology Manual Light Microscopy    Fentanyl, Urine - Urine, Clean Catch [[id number]]  (Normal) Collected: 03/27/25 1543    Specimen: Urine, Clean Catch Updated: 03/27/25 1625     Fentanyl, Urine Negative    Narrative:      Negative Threshold:      Fentanyl 5 ng/mL     The normal value for the drug tested is negative. This report includes final unconfirmed screening results to be used for medical treatment purposes only. Unconfirmed results must not be used for non-medical purposes such as employment or legal testing. Clinical consideration should be applied to any drug of abuse test, particularly when unconfirmed results are used.           Urinalysis With Microscopic If Indicated (No Culture) - Urine, Clean Catch [[id number]]  (Abnormal)  Collected: 03/27/25 1543    Specimen: Urine, Clean Catch Updated: 03/27/25 1607     Color, UA Yellow     Appearance, UA Clear     pH, UA 8.5     Specific Gravity, UA 1.015     Glucose, UA Negative     Ketones, UA 15 mg/dL (1+)     Bilirubin, UA Negative     Blood, UA Negative     Protein, UA Trace     Leuk Esterase, UA Negative     Nitrite, UA Positive     Urobilinogen, UA 0.2 E.U./dL    Urine Drug Screen - Urine, Clean Catch [[id number]]  (Normal) Collected: 03/27/25 1543    Specimen: Urine, Clean Catch Updated: 03/27/25 1602     THC, Screen, Urine Negative     Phencyclidine (PCP), Urine Negative     Cocaine Screen, Urine Negative     Methamphetamine, Ur Negative     Opiate Screen Negative     Amphetamine Screen, Urine Negative     Benzodiazepine Screen, Urine Negative     Tricyclic Antidepressants Screen Negative     Methadone Screen, Urine Negative     Barbiturates Screen, Urine Negative     Oxycodone Screen, Urine Negative     Buprenorphine, Screen, Urine Negative    Narrative:      Cutoff For Drugs Screened:    Amphetamines               500 ng/ml  Barbiturates               200 ng/ml  Benzodiazepines            150 ng/ml  Cocaine                    150 ng/ml  Methadone                  200 ng/ml  Opiates                    100 ng/ml  Phencyclidine               25 ng/ml  THC                         50 ng/ml  Methamphetamine            500 ng/ml  Tricyclic Antidepressants  300 ng/ml  Oxycodone                  100 ng/ml  Buprenorphine               10 ng/ml    The normal value for all drugs tested is negative. This report includes unconfirmed screening results, with the cutoff values listed, to be used for medical treatment purposes only.  Unconfirmed results must not be used for non-medical purposes such as employment or legal testing.  Clinical consideration should be applied to any drug of abuse test, particularly when unconfirmed results are used.      Magnesium [[id number]]  (Abnormal) Collected: 03/27/25  1405    Specimen: Blood Updated: 03/27/25 1526     Magnesium 1.5 mg/dL     hCG, Quantitative, Pregnancy [[id number]] Collected: 03/27/25 1405    Specimen: Blood Updated: 03/27/25 1503     HCG Quantitative <0.20 mIU/mL     Narrative:      HCG Ranges by Gestational Age    Females - non-pregnant premenopausal   </= 1mIU/mL HCG  Females - postmenopausal               </= 7mIU/mL HCG    3 Weeks         5.8 -    71.2 mIU/mL  4 Weeks         9.5 -     750 mIU/mL  5 Weeks         217 -   7,138 mIU/mL  6 Weeks         158 -  31,795 mIU/mL  7 Weeks       3,697 - 163,563 mIU/mL  8 Weeks      32,065 - 149,571 mIU/mL  9 Weeks      63,803 - 151,410 mIU/mL  10 Weeks     46,509 - 186,977 mIU/mL  12 Weeks     27,832 - 210,612 mIU/mL  14 Weeks     13,950 -  62,530 mIU/mL  15 Weeks     12,039 -  70,971 mIU/mL  16 Weeks      9,040 -  56,451 mIU/mL  17 Weeks      8,175 -  55,868 mIU/mL  18 Weeks      8,099 -  58,176 mIU/mL    Acetaminophen Level [[id number]]  (Normal) Collected: 03/27/25 1405    Specimen: Blood Updated: 03/27/25 1452     Acetaminophen <5.0 mcg/mL     Salicylate Level [[id number]]  (Normal) Collected: 03/27/25 1405    Specimen: Blood Updated: 03/27/25 1452     Salicylate <0.5 mg/dL     Comprehensive Metabolic Panel [[id number]]  (Abnormal) Collected: 03/27/25 1405    Specimen: Blood Updated: 03/27/25 1441     Glucose 122 mg/dL      BUN 5 mg/dL      Creatinine 0.53 mg/dL      Sodium 139 mmol/L      Potassium 2.9 mmol/L      Chloride 93 mmol/L      CO2 23.4 mmol/L      Calcium 9.6 mg/dL      Total Protein 7.8 g/dL      Albumin 4.5 g/dL      ALT (SGPT) 264 U/L      AST (SGOT) 393 U/L      Alkaline Phosphatase 87 U/L      Total Bilirubin 1.3 mg/dL      Globulin 3.3 gm/dL      A/G Ratio 1.4 g/dL      BUN/Creatinine Ratio 9.4     Anion Gap 22.6 mmol/L      eGFR 127.0 mL/min/1.73     Narrative:      GFR Categories in Chronic Kidney Disease (CKD)      GFR Category          GFR (mL/min/1.73)    Interpretation  G1                      90 or greater         Normal or high (1)  G2                      60-89                Mild decrease (1)  G3a                   45-59                Mild to moderate decrease  G3b                   30-44                Moderate to severe decrease  G4                    15-29                Severe decrease  G5                    14 or less           Kidney failure          (1)In the absence of evidence of kidney disease, neither GFR category G1 or G2 fulfill the criteria for CKD.    eGFR calculation 2021 CKD-EPI creatinine equation, which does not include race as a factor    Ethanol [[id number]]  (Normal) Collected: 03/27/25 1405    Specimen: Blood Updated: 03/27/25 1440     Ethanol <10 mg/dL     Narrative:      Elevated lactic acid concentration and lactate dehydrogenase(LD) activity may falsely elevate enzymatically determined ethanol levels. Not for legal purposes.     [place] Draw [[id number]] Collected: 03/27/25 1405    Specimen: Blood Updated: 03/27/25 1431    Narrative:      The following orders were created for panel order [place] Draw.  Procedure                               Abnormality         Status                     ---------                               -----------         ------                     Green Top (Gel)[[id number]]                                  Final result               Lavender Top[[id number]]                                     Final result               Gold Top - SST[[id number]]                                   Final result               Gray Top[[id number]]                                         Final result               Light Blue Top[[id number]]                                   Final result                 Please view results for these tests on the individual orders.    Green Top (Gel) [[id number]] Collected: 03/27/25 1405    Specimen: Blood Updated: 03/27/25 1431     Extra Tube Hold for add-ons.     Comment: Auto resulted.       Lavender Top [[id number]] Collected: 03/27/25 1405     Specimen: Blood Updated: 03/27/25 1431     Extra Tube hold for add-on     Comment: Auto resulted       Light Blue Top [[id number]] Collected: 03/27/25 1405    Specimen: Blood Updated: 03/27/25 1431     Extra Tube Hold for add-ons.     Comment: Auto resulted       Gold Top - SST [[id number]] Collected: 03/27/25 1405    Specimen: Blood Updated: 03/27/25 1431     Extra Tube Hold for add-ons.     Comment: Auto resulted.       Gray Top [[id number]] Collected: 03/27/25 1405    Specimen: Blood Updated: 03/27/25 1431     Extra Tube Hold for add-ons.     Comment: Auto resulted.       CBC Auto Differential [[id number]]  (Abnormal) Collected: 03/27/25 1405    Specimen: Blood Updated: 03/27/25 1421     WBC 3.57 10*3/mm3      RBC 4.22 10*6/mm3      Hemoglobin 13.9 g/dL      Hematocrit 40.8 %      MCV 96.7 fL      MCH 32.9 pg      MCHC 34.1 g/dL      RDW 15.6 %      RDW-SD 55.7 fl      MPV 10.5 fL      Platelets 173 10*3/mm3      Neutrophil % 59.0 %      Lymphocyte % 25.2 %      Monocyte % 14.6 %      Eosinophil % 0.0 %      Basophil % 0.6 %      Immature Grans % 0.6 %      Neutrophils, Absolute 2.11 10*3/mm3      Lymphocytes, Absolute 0.90 10*3/mm3      Monocytes, Absolute 0.52 10*3/mm3      Eosinophils, Absolute 0.00 10*3/mm3      Basophils, Absolute 0.02 10*3/mm3      Immature Grans, Absolute 0.02 10*3/mm3           Imaging Results (Last 24 Hours)       ** No results found for the last 24 hours. **          ECG/EMG Results (last 24 hours)       Procedure Component Value Units Date/Time    Telemetry Scan [[id number]] Resulted: 03/27/25     Updated: 03/27/25 1434    Telemetry Scan [[id number]] Resulted: 03/27/25     Updated: 03/27/25 1633    ECG 12 Lead Other; etoh [[id number]] Collected: 03/27/25 1413     Updated: 03/28/25 0749     QT Interval 376 ms      QTC Interval 492 ms     Narrative:      Test Reason : Other~  Blood Pressure :   */*   mmHG  Vent. Rate : 103 BPM     Atrial Rate : 103 BPM     P-R Int : 164 ms          QRS Dur :   92 ms      QT Int : 376 ms       P-R-T Axes :  56   5  21 degrees    QTcB Int : 492 ms    Sinus tachycardia  Otherwise normal ECG  No previous ECGs available    Referred By:            Confirmed By:           Physician Progress Notes (last 24 hours)  Notes from 03/27/25 1146 through 03/28/25 1146   No notes of this type exist for this encounter.       Consult Notes (last 24 hours)  Notes from 03/27/25 1146 through 03/28/25 1146   No notes of this type exist for this encounter.

## 2025-03-28 NOTE — PLAN OF CARE
Problem: Adult Inpatient Plan of Care  Goal: Plan of Care Review  Outcome: Progressing  Goal: Patient-Specific Goal (Individualized)  Outcome: Progressing  Goal: Absence of Hospital-Acquired Illness or Injury  Outcome: Progressing  Intervention: Identify and Manage Fall Risk  Recent Flowsheet Documentation  Taken 3/27/2025 2052 by Nini Henson, RN  Safety Promotion/Fall Prevention:   activity supervised   assistive device/personal items within reach   clutter free environment maintained   safety round/check completed   toileting scheduled  Intervention: Prevent Infection  Recent Flowsheet Documentation  Taken 3/27/2025 2052 by Nini Henson RN  Infection Prevention: rest/sleep promoted  Goal: Optimal Comfort and Wellbeing  Outcome: Progressing  Intervention: Provide Person-Centered Care  Recent Flowsheet Documentation  Taken 3/27/2025 2052 by Nini Henson RN  Trust Relationship/Rapport:   care explained   choices provided   emotional support provided   empathic listening provided  Goal: Readiness for Transition of Care  Outcome: Progressing  Intervention: Mutually Develop Transition Plan  Recent Flowsheet Documentation  Taken 3/27/2025 2105 by Nini Henson, RN  Transportation Anticipated: family or friend will provide  Patient/Family Anticipated Services at Transition: none  Patient/Family Anticipates Transition to: home  Taken 3/27/2025 2047 by Nini Henson, RN  Equipment Currently Used at Home: none   Goal Outcome Evaluation:

## 2025-03-29 PROBLEM — F10.939 ALCOHOL WITHDRAWAL: Status: ACTIVE | Noted: 2025-03-29

## 2025-03-29 LAB
ALBUMIN SERPL-MCNC: 3.9 G/DL (ref 3.5–5.2)
ALP SERPL-CCNC: 68 U/L (ref 39–117)
ALT SERPL W P-5'-P-CCNC: 164 U/L (ref 1–33)
AST SERPL-CCNC: 163 U/L (ref 1–32)
BASOPHILS # BLD AUTO: 0.05 10*3/MM3 (ref 0–0.2)
BASOPHILS NFR BLD AUTO: 1.8 % (ref 0–1.5)
BILIRUB CONJ SERPL-MCNC: 0.5 MG/DL (ref 0–0.3)
BILIRUB INDIRECT SERPL-MCNC: 0.5 MG/DL
BILIRUB SERPL-MCNC: 1 MG/DL (ref 0–1.2)
DEPRECATED RDW RBC AUTO: 58 FL (ref 37–54)
EOSINOPHIL # BLD AUTO: 0.06 10*3/MM3 (ref 0–0.4)
EOSINOPHIL NFR BLD AUTO: 2.1 % (ref 0.3–6.2)
ERYTHROCYTE [DISTWIDTH] IN BLOOD BY AUTOMATED COUNT: 15.8 % (ref 12.3–15.4)
FOLATE SERPL-MCNC: 8.29 NG/ML (ref 4.78–24.2)
HCT VFR BLD AUTO: 36 % (ref 34–46.6)
HGB BLD-MCNC: 12.1 G/DL (ref 12–15.9)
IMM GRANULOCYTES # BLD AUTO: 0.03 10*3/MM3 (ref 0–0.05)
IMM GRANULOCYTES NFR BLD AUTO: 1.1 % (ref 0–0.5)
LYMPHOCYTES # BLD AUTO: 1.37 10*3/MM3 (ref 0.7–3.1)
LYMPHOCYTES NFR BLD AUTO: 48.6 % (ref 19.6–45.3)
MCH RBC QN AUTO: 33.5 PG (ref 26.6–33)
MCHC RBC AUTO-ENTMCNC: 33.6 G/DL (ref 31.5–35.7)
MCV RBC AUTO: 99.7 FL (ref 79–97)
MONOCYTES # BLD AUTO: 0.38 10*3/MM3 (ref 0.1–0.9)
MONOCYTES NFR BLD AUTO: 13.5 % (ref 5–12)
NEUTROPHILS NFR BLD AUTO: 0.93 10*3/MM3 (ref 1.7–7)
NEUTROPHILS NFR BLD AUTO: 32.9 % (ref 42.7–76)
NRBC BLD AUTO-RTO: 0 /100 WBC (ref 0–0.2)
PLATELET # BLD AUTO: 137 10*3/MM3 (ref 140–450)
PMV BLD AUTO: 10.4 FL (ref 6–12)
PROT SERPL-MCNC: 6.5 G/DL (ref 6–8.5)
RBC # BLD AUTO: 3.61 10*6/MM3 (ref 3.77–5.28)
VIT B12 BLD-MCNC: 712 PG/ML (ref 211–946)
WBC NRBC COR # BLD AUTO: 2.82 10*3/MM3 (ref 3.4–10.8)

## 2025-03-29 PROCEDURE — 96376 TX/PRO/DX INJ SAME DRUG ADON: CPT

## 2025-03-29 PROCEDURE — 80076 HEPATIC FUNCTION PANEL: CPT | Performed by: INTERNAL MEDICINE

## 2025-03-29 PROCEDURE — 85060 BLOOD SMEAR INTERPRETATION: CPT | Performed by: NURSE PRACTITIONER

## 2025-03-29 PROCEDURE — 25010000002 THIAMINE HCL 200 MG/2ML SOLUTION: Performed by: INTERNAL MEDICINE

## 2025-03-29 PROCEDURE — 82607 VITAMIN B-12: CPT | Performed by: INTERNAL MEDICINE

## 2025-03-29 PROCEDURE — 82746 ASSAY OF FOLIC ACID SERUM: CPT | Performed by: INTERNAL MEDICINE

## 2025-03-29 PROCEDURE — 85025 COMPLETE CBC W/AUTO DIFF WBC: CPT | Performed by: INTERNAL MEDICINE

## 2025-03-29 PROCEDURE — 99232 SBSQ HOSP IP/OBS MODERATE 35: CPT | Performed by: NURSE PRACTITIONER

## 2025-03-29 PROCEDURE — 25010000002 MIDAZOLAM PER 1 MG: Performed by: INTERNAL MEDICINE

## 2025-03-29 RX ORDER — BUSPIRONE HYDROCHLORIDE 5 MG/1
5 TABLET ORAL EVERY 8 HOURS SCHEDULED
Status: DISCONTINUED | OUTPATIENT
Start: 2025-03-29 | End: 2025-03-30 | Stop reason: HOSPADM

## 2025-03-29 RX ORDER — CARVEDILOL 3.12 MG/1
3.12 TABLET ORAL 2 TIMES DAILY WITH MEALS
Status: DISCONTINUED | OUTPATIENT
Start: 2025-03-29 | End: 2025-03-30 | Stop reason: HOSPADM

## 2025-03-29 RX ADMIN — BUSPIRONE HYDROCHLORIDE 5 MG: 5 TABLET ORAL at 14:11

## 2025-03-29 RX ADMIN — BUSPIRONE HYDROCHLORIDE 5 MG: 5 TABLET ORAL at 21:21

## 2025-03-29 RX ADMIN — PANTOPRAZOLE SODIUM 40 MG: 40 TABLET, DELAYED RELEASE ORAL at 05:02

## 2025-03-29 RX ADMIN — LORAZEPAM 1 MG: 1 TABLET ORAL at 22:36

## 2025-03-29 RX ADMIN — Medication 10 ML: at 09:14

## 2025-03-29 RX ADMIN — THIAMINE HYDROCHLORIDE 200 MG: 100 INJECTION, SOLUTION INTRAMUSCULAR; INTRAVENOUS at 21:21

## 2025-03-29 RX ADMIN — LORAZEPAM 1 MG: 1 TABLET ORAL at 17:43

## 2025-03-29 RX ADMIN — FOLIC ACID 1 MG: 1 TABLET ORAL at 09:14

## 2025-03-29 RX ADMIN — MIDAZOLAM HYDROCHLORIDE 2 MG: 1 INJECTION, SOLUTION INTRAMUSCULAR; INTRAVENOUS at 00:55

## 2025-03-29 RX ADMIN — LORAZEPAM 1 MG: 1 TABLET ORAL at 03:13

## 2025-03-29 RX ADMIN — LORAZEPAM 1 MG: 1 TABLET ORAL at 05:02

## 2025-03-29 RX ADMIN — CARVEDILOL 3.12 MG: 3.12 TABLET, FILM COATED ORAL at 11:17

## 2025-03-29 RX ADMIN — CARVEDILOL 3.12 MG: 3.12 TABLET, FILM COATED ORAL at 17:43

## 2025-03-29 RX ADMIN — Medication 10 ML: at 21:21

## 2025-03-29 RX ADMIN — Medication 1 TABLET: at 09:14

## 2025-03-29 RX ADMIN — THIAMINE HYDROCHLORIDE 200 MG: 100 INJECTION, SOLUTION INTRAMUSCULAR; INTRAVENOUS at 05:02

## 2025-03-29 RX ADMIN — LORAZEPAM 1 MG: 1 TABLET ORAL at 11:16

## 2025-03-29 RX ADMIN — THIAMINE HYDROCHLORIDE 200 MG: 100 INJECTION, SOLUTION INTRAMUSCULAR; INTRAVENOUS at 14:11

## 2025-03-29 NOTE — PLAN OF CARE
Problem: Adult Inpatient Plan of Care  Goal: Plan of Care Review  Outcome: Progressing  Goal: Patient-Specific Goal (Individualized)  Outcome: Progressing  Goal: Absence of Hospital-Acquired Illness or Injury  Outcome: Progressing  Goal: Optimal Comfort and Wellbeing  Outcome: Progressing  Goal: Readiness for Transition of Care  Outcome: Progressing     Problem: Alcohol Withdrawal  Goal: Optimal Neurologic Function  Outcome: Progressing  Goal: Readiness for Change Identified  Outcome: Progressing   Goal Outcome Evaluation:

## 2025-03-29 NOTE — PROGRESS NOTES
[facility]  PROGRESS NOTE    Patient Name: Salina De La Fuente  : 1995  MRN: [id number]    Date of Admission: 3/27/2025  Primary Care Physician: Loy Costa PA-C    Subjective   Subjective     CC:  F/u EtOH withdrawal symptoms    HPI:  Patient resting in bed.  Mother at bedside.  Patient hoping to go home soon.  Denies withdrawal symptoms currently.  Still on scheduled Ativan every 6 hours.  Patient with /114 and tachycardic on the monitor.  Patient says she has had high blood pressure before but her PCP has not wanted to treat it until she has been treated for alcoholism.  Says she has not been treated for anxiety yet either for the same reason.  Is agreeable to try low-dose meds to treat BP and anxiety.  Reports she follows with endocrinology for known thyroid nodules.  Also reports she is going to have her implanted birth control removed soon.  Patient reports a MH for migraines.      Objective   Objective     Vital Signs:   Temp:  [98.2 °F (36.8 °C)-98.8 °F (37.1 °C)] 98.3 °F (36.8 °C)  Heart Rate:  [] 101  Resp:  [18] 18  BP: (132-156)/() 156/118     Physical Exam:  Constitutional: No acute distress, awake, alert  HENT: NCAT, mucous membranes moist  Respiratory: Clear to auscultation bilaterally, respiratory effort normal, room air  Cardiovascular: Tachycardic, no murmurs, rubs, or gallops  Gastrointestinal: Positive bowel sounds, soft, nontender, nondistended  Musculoskeletal: No bilateral ankle edema  Psychiatric: Appropriate affect, cooperative  Neurologic: Oriented x 3, moves all extremities, speech clear  Skin: No rashes      Results Reviewed:  LAB RESULTS:      Lab 25  0346 25  0346 25  1405   WBC 2.82* 2.37* 3.57   HEMOGLOBIN 12.1 11.7* 13.9   HEMATOCRIT 36.0 35.1 40.8   PLATELETS 137* 139* 173   NEUTROS ABS 0.93* 0.88* 2.11   IMMATURE GRANS (ABS) 0.03 0.01 0.02   LYMPHS ABS 1.37 1.14 0.90   MONOS ABS  0.38 0.28 0.52   EOS ABS 0.06 0.03 0.00   MCV 99.7* 100.0* 96.7         Lab 03/28/25  0346 03/27/25  1405   SODIUM 139 139   POTASSIUM 2.8* 2.9*   CHLORIDE 99 93*   CO2 23.0 23.4   ANION GAP 17.0* 22.6*   BUN 5* 5*   CREATININE 0.46* 0.53*   EGFR 131.4 127.0   GLUCOSE 101* 122*   CALCIUM 8.6 9.6   MAGNESIUM 1.9 1.5*   PHOSPHORUS 3.4  --          Lab 03/29/25  0345 03/28/25  0346 03/27/25  1405   TOTAL PROTEIN 6.5 6.3 7.8   ALBUMIN 3.9 3.7 4.5   GLOBULIN  --  2.6 3.3   ALT (SGPT) 164* 214* 264*   AST (SGOT) 163* 474* 393*   BILIRUBIN 1.0 1.1 1.3*   INDIRECT BILIRUBIN 0.5  --   --    BILIRUBIN DIRECT 0.5*  --   --    ALK PHOS 68 80 87                     Brief Urine Lab Results  (Last result in the past 365 days)        Color   Clarity   Blood   Leuk Est   Nitrite   Protein   CREAT   Urine HCG        03/27/25 1543 Yellow   Clear   Negative   Negative   Positive   Trace                   Microbiology Results Abnormal       None            No radiology results from the last 24 hrs        Current medications:  Scheduled Meds:folic acid, 1 mg, Oral, Daily  LORazepam, 1 mg, Oral, Q6H   Followed by  LORazepam, 1 mg, Oral, Q12H   Followed by  [START ON 3/31/2025] LORazepam, 1 mg, Oral, Daily  multivitamin with minerals, 1 tablet, Oral, Daily  pantoprazole, 40 mg, Oral, Daily  potassium chloride, 40 mEq, Oral, Once  sodium chloride, 10 mL, Intravenous, Q12H  thiamine (B-1) IV, 200 mg, Intravenous, Q8H   Followed by  [START ON 4/2/2025] thiamine, 100 mg, Oral, Daily      Continuous Infusions:   PRN Meds:.  Calcium Replacement - Follow Nurse / BPA Driven Protocol    HYDROcodone-acetaminophen    ibuprofen    LORazepam **OR** midazolam **OR** LORazepam **OR** midazolam **OR** midazolam **OR** midazolam    Magnesium Standard Dose Replacement - Follow Nurse / BPA Driven Protocol    melatonin    Morphine **AND** naloxone    nitroglycerin    ondansetron    Phosphorus Replacement - Follow Nurse / BPA Driven Protocol    Potassium  Replacement - Follow Nurse / BPA Driven Protocol    sodium chloride    sodium chloride    sodium chloride    Assessment & Plan   Assessment & Plan     Active Hospital Problems    Diagnosis  POA    **Alcohol intoxication [F10.929]  Yes      Resolved Hospital Problems   No resolved problems to display.        Brief Hospital Course to date:  Salina De La Fuente is a 29 y.o. female  with past medical history significant for history of anxiety and history of gestational diabetes mellitus.  Patient was admitted for alcohol withdrawal symptoms.     This patient's problems and plans were partially entered by my partner and updated as appropriate by me 03/29/25.      Alcohol withdrawal  -- Ethanol < 10 on admission  - CIWA protocol with scheduled/as needed oral/IV Ativan/Versed.  - Daily thiamine, multivitamin, folic acid.  - s/p IVF with 0.9 NS  - Nausea control with IV Zofran as needed.  - Declines case management visit to arrange longer-term rehabilitation.  - She states she has appointment with counselor already arranged as outpatient  -- Coreg 3.125 mg BID for BP, DC IVF     Anxiety  - She states that she takes no home medications at this time  -- Trial low-does Buspar TID     Fatty liver disease  - Monitored as outpatient.  - LFTs elevated, hepatocellular pattern, hep panel negative  -- LFTs improving  -- AM CMP     Leukopenia, thrombocytopenia, elevated MCV  -Will monitor, ? Inflammatory vs viral vs infection vs other  -B12 and folate appropriate  -Check peripheral blood smear  -AM CBC w/ diff      Expected Discharge Location and Transportation: Home  Expected Discharge   Expected Discharge Date: 3/30/2025; Expected Discharge Time:      VTE Prophylaxis:  Mechanical VTE prophylaxis orders are present.         AM-PAC 6 Clicks Score (PT): 24 (03/28/25 2123)    CODE STATUS:   Code Status and Medical Interventions: CPR (Attempt to Resuscitate); Full Support   Ordered at: 03/27/25 2137     Code Status (Patient has no  pulse and is not breathing):    CPR (Attempt to Resuscitate)     Medical Interventions (Patient has pulse or is breathing):    Full Support     Level Of Support Discussed With:    Patient       Lotus Slaughter, APRN  03/29/25

## 2025-03-30 ENCOUNTER — READMISSION MANAGEMENT (OUTPATIENT)
Dept: CALL CENTER | Facility: HOSPITAL | Age: 30
End: 2025-03-30
Payer: COMMERCIAL

## 2025-03-30 VITALS
TEMPERATURE: 98.1 F | HEIGHT: 64 IN | BODY MASS INDEX: 30.22 KG/M2 | RESPIRATION RATE: 18 BRPM | DIASTOLIC BLOOD PRESSURE: 101 MMHG | WEIGHT: 177 LBS | SYSTOLIC BLOOD PRESSURE: 135 MMHG | HEART RATE: 96 BPM | OXYGEN SATURATION: 98 %

## 2025-03-30 PROBLEM — F10.929 ALCOHOL INTOXICATION: Status: RESOLVED | Noted: 2025-03-27 | Resolved: 2025-03-30

## 2025-03-30 LAB
ALBUMIN SERPL-MCNC: 4 G/DL (ref 3.5–5.2)
ALBUMIN/GLOB SERPL: 1.8 G/DL
ALP SERPL-CCNC: 65 U/L (ref 39–117)
ALT SERPL W P-5'-P-CCNC: 142 U/L (ref 1–33)
ANION GAP SERPL CALCULATED.3IONS-SCNC: 13 MMOL/L (ref 5–15)
AST SERPL-CCNC: 124 U/L (ref 1–32)
BASOPHILS # BLD AUTO: 0.05 10*3/MM3 (ref 0–0.2)
BASOPHILS NFR BLD AUTO: 1.4 % (ref 0–1.5)
BILIRUB SERPL-MCNC: 0.8 MG/DL (ref 0–1.2)
BUN SERPL-MCNC: 9 MG/DL (ref 6–20)
BUN/CREAT SERPL: 15.8 (ref 7–25)
CALCIUM SPEC-SCNC: 9.1 MG/DL (ref 8.6–10.5)
CHLORIDE SERPL-SCNC: 104 MMOL/L (ref 98–107)
CO2 SERPL-SCNC: 21 MMOL/L (ref 22–29)
CREAT SERPL-MCNC: 0.57 MG/DL (ref 0.57–1)
DEPRECATED RDW RBC AUTO: 58.9 FL (ref 37–54)
EGFRCR SERPLBLD CKD-EPI 2021: 124.8 ML/MIN/1.73
EOSINOPHIL # BLD AUTO: 0.05 10*3/MM3 (ref 0–0.4)
EOSINOPHIL NFR BLD AUTO: 1.4 % (ref 0.3–6.2)
ERYTHROCYTE [DISTWIDTH] IN BLOOD BY AUTOMATED COUNT: 15.8 % (ref 12.3–15.4)
GLOBULIN UR ELPH-MCNC: 2.2 GM/DL
GLUCOSE SERPL-MCNC: 145 MG/DL (ref 65–99)
HCT VFR BLD AUTO: 38.9 % (ref 34–46.6)
HGB BLD-MCNC: 12.7 G/DL (ref 12–15.9)
IMM GRANULOCYTES # BLD AUTO: 0.03 10*3/MM3 (ref 0–0.05)
IMM GRANULOCYTES NFR BLD AUTO: 0.8 % (ref 0–0.5)
LYMPHOCYTES # BLD AUTO: 1.38 10*3/MM3 (ref 0.7–3.1)
LYMPHOCYTES NFR BLD AUTO: 37.8 % (ref 19.6–45.3)
MCH RBC QN AUTO: 33.2 PG (ref 26.6–33)
MCHC RBC AUTO-ENTMCNC: 32.6 G/DL (ref 31.5–35.7)
MCV RBC AUTO: 101.8 FL (ref 79–97)
MONOCYTES # BLD AUTO: 0.45 10*3/MM3 (ref 0.1–0.9)
MONOCYTES NFR BLD AUTO: 12.3 % (ref 5–12)
NEUTROPHILS NFR BLD AUTO: 1.69 10*3/MM3 (ref 1.7–7)
NEUTROPHILS NFR BLD AUTO: 46.3 % (ref 42.7–76)
NRBC BLD AUTO-RTO: 0 /100 WBC (ref 0–0.2)
PLATELET # BLD AUTO: 153 10*3/MM3 (ref 140–450)
PMV BLD AUTO: 10.9 FL (ref 6–12)
POTASSIUM SERPL-SCNC: 3.7 MMOL/L (ref 3.5–5.2)
PROT SERPL-MCNC: 6.2 G/DL (ref 6–8.5)
RBC # BLD AUTO: 3.82 10*6/MM3 (ref 3.77–5.28)
SODIUM SERPL-SCNC: 138 MMOL/L (ref 136–145)
WBC NRBC COR # BLD AUTO: 3.65 10*3/MM3 (ref 3.4–10.8)

## 2025-03-30 PROCEDURE — 25010000002 THIAMINE HCL 200 MG/2ML SOLUTION: Performed by: INTERNAL MEDICINE

## 2025-03-30 PROCEDURE — 80053 COMPREHEN METABOLIC PANEL: CPT | Performed by: NURSE PRACTITIONER

## 2025-03-30 PROCEDURE — 85025 COMPLETE CBC W/AUTO DIFF WBC: CPT | Performed by: NURSE PRACTITIONER

## 2025-03-30 PROCEDURE — 99239 HOSP IP/OBS DSCHRG MGMT >30: CPT | Performed by: NURSE PRACTITIONER

## 2025-03-30 PROCEDURE — 96376 TX/PRO/DX INJ SAME DRUG ADON: CPT

## 2025-03-30 RX ORDER — GAUZE BANDAGE 2" X 2"
100 BANDAGE TOPICAL DAILY
Qty: 30 TABLET | Refills: 0 | Status: SHIPPED | OUTPATIENT
Start: 2025-04-02

## 2025-03-30 RX ORDER — CARVEDILOL 3.12 MG/1
3.12 TABLET ORAL 2 TIMES DAILY WITH MEALS
Qty: 60 TABLET | Refills: 0 | Status: SHIPPED | OUTPATIENT
Start: 2025-03-30

## 2025-03-30 RX ORDER — FOLIC ACID 1 MG/1
1 TABLET ORAL DAILY
Qty: 30 TABLET | Refills: 0 | Status: SHIPPED | OUTPATIENT
Start: 2025-03-31

## 2025-03-30 RX ORDER — MULTIPLE VITAMINS W/ MINERALS TAB 9MG-400MCG
1 TAB ORAL DAILY
Qty: 30 TABLET | Refills: 0 | Status: SHIPPED | OUTPATIENT
Start: 2025-03-31

## 2025-03-30 RX ORDER — BUSPIRONE HYDROCHLORIDE 5 MG/1
5 TABLET ORAL 3 TIMES DAILY
Qty: 90 TABLET | Refills: 0 | Status: SHIPPED | OUTPATIENT
Start: 2025-03-30 | End: 2025-04-07

## 2025-03-30 RX ADMIN — PANTOPRAZOLE SODIUM 40 MG: 40 TABLET, DELAYED RELEASE ORAL at 06:16

## 2025-03-30 RX ADMIN — FOLIC ACID 1 MG: 1 TABLET ORAL at 08:18

## 2025-03-30 RX ADMIN — THIAMINE HYDROCHLORIDE 200 MG: 100 INJECTION, SOLUTION INTRAMUSCULAR; INTRAVENOUS at 06:16

## 2025-03-30 RX ADMIN — Medication 1 TABLET: at 08:18

## 2025-03-30 RX ADMIN — CARVEDILOL 3.12 MG: 3.12 TABLET, FILM COATED ORAL at 08:18

## 2025-03-30 RX ADMIN — LORAZEPAM 1 MG: 1 TABLET ORAL at 08:17

## 2025-03-30 RX ADMIN — Medication 10 ML: at 09:04

## 2025-03-30 RX ADMIN — BUSPIRONE HYDROCHLORIDE 5 MG: 5 TABLET ORAL at 06:16

## 2025-03-30 NOTE — DISCHARGE SUMMARY
[facility] Medicine Services  DISCHARGE SUMMARY    Patient Name: Salina De La Fuente  : 1995  MRN: [id number]    Date of Admission: 3/27/2025  1:55 PM  Date of Discharge:  3/30/2025  Primary Care Physician: Loy Costa PA-C    Consults       No orders found from 2025 to 3/28/2025.            Hospital Course     Presenting Problem: Alcohol withdrawal    Active Hospital Problems    Diagnosis  POA    **Alcohol withdrawal [F10.939]  Yes    Alcohol intoxication [F10.929]  Yes      Resolved Hospital Problems   No resolved problems to display.          Hospital Course:  Salina De La Fuente is a 29 y.o. female with past medical history significant for history of anxiety and history of gestational diabetes mellitus.  Patient was admitted for alcohol withdrawal symptoms. She completed CIWA protocol with scheduled Ativan. At time of discharge, patient denies s/s withdrawal. BP and labs have improved. Patient to follow up with PCP and states she will be seeing a counselor to address her issues with alcohol.      Alcohol withdrawal  -- Ethanol < 10 on admission  - Continue daily thiamine, multivitamin, folic acid  -- Started on Coreg 3.125 mg BID for elevated BP, HR. Patient to follow up with PCP for further management of BP. Has been instructed to keep a log of BP x 1 week to take to PCP follow-up.     Hypokalemia - resolved  -K 2.8  -electrolyte replacement protocol     Anxiety  - She states that she takes no home medications at this time  -- Trial low-does Buspar TID, pt notes improvement     Fatty liver disease  - Monitored as outpatient.  - LFTs elevated, hepatocellular pattern, hep panel negative  -- LFTs improving     Leukopenia, thrombocytopenia, elevated MCV  -? Inflammatory vs viral vs infection vs other  -B12 and folate appropriate, continue   -Peripheral blood smear pending  -WBCs, platelets now wnl      Discharge Follow Up Recommendations for outpatient  labs/diagnostics:  --Follow up with PCP one week    Day of Discharge     HPI:   Patient resting in bed.  Family at bedside.  Patient says BuSpar has helped her anxiety.  She also thinks Coreg has helped and has noticed heart rate has come down.  Discussed keeping a log of her blood pressure to take to PCP follow-up.  Patient denies symptoms of alcohol withdrawal.  She appears stable for discharge home.  Vital Signs:   Temp:  [97.1 °F (36.2 °C)-98.5 °F (36.9 °C)] 98.1 °F (36.7 °C)  Heart Rate:  [] 96  Resp:  [18-20] 18  BP: (135-159)/() 135/101      Physical Exam:  Constitutional: No acute distress, awake, alert  HENT: NCAT, mucous membranes moist  Respiratory: Clear to auscultation bilaterally, respiratory effort normal, room air  Cardiovascular: RRR, no murmurs, rubs, or gallops  Gastrointestinal: Positive bowel sounds, soft, nontender, nondistended  Musculoskeletal: No bilateral ankle edema  Psychiatric: Appropriate affect, cooperative  Neurologic: Oriented x 3, moves all extremities, speech clear  Skin: No rashes    Pertinent  and/or Most Recent Results     LAB RESULTS:      Lab 03/30/25 0339 03/29/25 0346 03/28/25 0346 03/27/25  1405   WBC 3.65 2.82* 2.37* 3.57   HEMOGLOBIN 12.7 12.1 11.7* 13.9   HEMATOCRIT 38.9 36.0 35.1 40.8   PLATELETS 153 137* 139* 173   NEUTROS ABS 1.69* 0.93* 0.88* 2.11   IMMATURE GRANS (ABS) 0.03 0.03 0.01 0.02   LYMPHS ABS 1.38 1.37 1.14 0.90   MONOS ABS 0.45 0.38 0.28 0.52   EOS ABS 0.05 0.06 0.03 0.00   .8* 99.7* 100.0* 96.7         Lab 03/30/25 0339 03/28/25  0346 03/27/25  1405   SODIUM 138 139 139   POTASSIUM 3.7 2.8* 2.9*   CHLORIDE 104 99 93*   CO2 21.0* 23.0 23.4   ANION GAP 13.0 17.0* 22.6*   BUN 9 5* 5*   CREATININE 0.57 0.46* 0.53*   EGFR 124.8 131.4 127.0   GLUCOSE 145* 101* 122*   CALCIUM 9.1 8.6 9.6   MAGNESIUM  --  1.9 1.5*   PHOSPHORUS  --  3.4  --          Lab 03/30/25  0339 03/29/25  0345 03/28/25  0346 03/27/25  1405   TOTAL PROTEIN 6.2 6.5 6.3  7.8   ALBUMIN 4.0 3.9 3.7 4.5   GLOBULIN 2.2  --  2.6 3.3   ALT (SGPT) 142* 164* 214* 264*   AST (SGOT) 124* 163* 474* 393*   BILIRUBIN 0.8 1.0 1.1 1.3*   INDIRECT BILIRUBIN  --  0.5  --   --    BILIRUBIN DIRECT  --  0.5*  --   --    ALK PHOS 65 68 80 87                 Lab 03/29/25  0345   FOLATE 8.29   VITAMIN B 12 712         Brief Urine Lab Results  (Last result in the past 365 days)        Color   Clarity   Blood   Leuk Est   Nitrite   Protein   CREAT   Urine HCG        03/27/25 1543 Yellow   Clear   Negative   Negative   Positive   Trace                 Microbiology Results (last 10 days)       ** No results found for the last 240 hours. **            No radiology results for the last 10 days                Plan for Follow-up of Pending Labs/Results:   Pending Labs       Order Current Status    Peripheral Blood Smear In process          Discharge Details        Discharge Medications        New Medications        Instructions Start Date   busPIRone 5 MG tablet  Commonly known as: BUSPAR   5 mg, Oral, 3 Times Daily      carvedilol 3.125 MG tablet  Commonly known as: COREG   3.125 mg, Oral, 2 Times Daily With Meals      folic acid 1 MG tablet  Commonly known as: FOLVITE   1 mg, Oral, Daily   Start Date: March 31, 2025     Multivitamin Adult (Minerals) tablet   1 tablet, Oral, Daily   Start Date: March 31, 2025     Thiamine Mononitrate 100 MG tablet   100 mg, Oral, Daily   Start Date: April 2, 2025            Continue These Medications        Instructions Start Date   loratadine 10 MG tablet  Commonly known as: CLARITIN   10 mg, Daily      pantoprazole 40 MG EC tablet  Commonly known as: PROTONIX   40 mg, Oral, Daily             Stop These Medications      Nexplanon 68 MG implant subdermal implant  Generic drug: Etonogestrel              Allergies   Allergen Reactions    Corticosteroids Hives     With liquid version, has safely taken pill form    Prednisone Hives     Liquid prednisone         Discharge  Disposition:  Home or Self Care    Diet:  Hospital:  Diet Order   Procedures    Diet: Cardiac, Diabetic; Healthy Heart (2-3 Na+); Consistent Carbohydrate; Fluid Consistency: Thin (IDDSI 0)       Diet Instructions       Diet: Regular/House Diet; Regular (IDDSI 7); Thin (IDDSI 0)      Discharge Diet: Regular/House Diet    Texture: Regular (IDDSI 7)    Fluid Consistency: Thin (IDDSI 0)             Activity:  Activity Instructions       Activity as Tolerated              Restrictions or Other Recommendations:         CODE STATUS:    Code Status and Medical Interventions: CPR (Attempt to Resuscitate); Full Support   Ordered at: 03/27/25 2137     Code Status (Patient has no pulse and is not breathing):    CPR (Attempt to Resuscitate)     Medical Interventions (Patient has pulse or is breathing):    Full Support     Level Of Support Discussed With:    Patient       No future appointments.    Additional Instructions for the Follow-ups that You Need to Schedule       Discharge Follow-up with PCP   As directed       Currently Documented PCP:    Loy Costa PA-C    PCP Phone Number:    [phone]     Follow Up Details: One week for post hospitalization follow up                  SHIRIN Morfin  03/30/25      Time Spent on Discharge:  I spent  35  minutes on this discharge activity which included: face-to-face encounter with the patient, reviewing the data in the system, coordination of the care with the nursing staff as well as consultants, documentation, and entering orders.

## 2025-03-30 NOTE — OUTREACH NOTE
Prep Survey      Flowsheet Row Responses   [facility] patient discharged from? [place]   Is LACE score < 7 ? Yes   Eligibility [facility]   Date of Admission 03/27/25   Date of Discharge 03/30/25   Discharge Disposition Home or Self Care   Discharge diagnosis Alcohol withdrawal   Does the patient have one of the following disease processes/diagnoses(primary or secondary)? Other   Does the patient have Home health ordered? No   Is there a DME ordered? No   Prep survey completed? Yes            SUNNY RODRIGUEZ - Registered Nurse

## 2025-03-31 ENCOUNTER — TRANSITIONAL CARE MANAGEMENT TELEPHONE ENCOUNTER (OUTPATIENT)
Dept: CALL CENTER | Facility: HOSPITAL | Age: 30
End: 2025-03-31
Payer: COMMERCIAL

## 2025-03-31 LAB
CYTOLOGIST CVX/VAG CYTO: NORMAL
PATH INTERP BLD-IMP: NORMAL

## 2025-03-31 NOTE — OUTREACH NOTE
Call Center TCM Note      Flowsheet Row Responses   [facility] patient discharged from? [place]   Does the patient have one of the following disease processes/diagnoses(primary or secondary)? Other   TCM attempt successful? No   Unsuccessful attempts Attempt 1  [Attempted to reach patient / spouse listed on verbal release. No answer.]   Call Status Voice mail issues  [No voicemail set up]            WALDO Hughes Registered Nurse    3/31/2025, 09:41 EDT

## 2025-03-31 NOTE — OUTREACH NOTE
Call Center TCM Note      Flowsheet Row Responses   [facility] patient discharged from? [place]   Does the patient have one of the following disease processes/diagnoses(primary or secondary)? Other   TCM attempt successful? No   Unsuccessful attempts Attempt 2  [ttempted to reach patient / spouse listed on verbal release. No answer.]   Call Status Voice mail issues  [No voicemail set up]            WALDO Hughes Registered Nurse    3/31/2025, 11:46 EDT

## 2025-04-01 ENCOUNTER — TRANSITIONAL CARE MANAGEMENT TELEPHONE ENCOUNTER (OUTPATIENT)
Dept: CALL CENTER | Facility: HOSPITAL | Age: 30
End: 2025-04-01
Payer: COMMERCIAL

## 2025-04-01 NOTE — OUTREACH NOTE
Call Center TCM Note      Flowsheet Row Responses   [facility] patient discharged from? [place]   Does the patient have one of the following disease processes/diagnoses(primary or secondary)? Other   TCM attempt successful? No   Unsuccessful attempts Attempt 3            Martin MORENO - Registered Nurse    4/1/2025, 11:54 EDT

## 2025-04-03 NOTE — PAYOR COMM NOTE
"Ref# [id number]   Admitted 3/27/25-3/30/25    JAZMINE Nunez, RN  Utilization Review  Phone [phone]  Fax [phone]    [address] [address]             Clara De La Fuente (29 y.o. Female)       Date of Birth   1995    Social Security Number       Address   [address] [place] [address]    Home Phone   [phone]    MRN   [id number]       Mandaeism   None    Marital Status                               Admission Date   3/27/2025    Admission Type   Emergency    Admitting Provider   Chris Alberto MD    Attending Provider       Department, Room/Bed   [facility] 5G, S548/1       Discharge Date   3/30/2025    Discharge Disposition   Home or Self Care    Discharge Destination                                 Attending Provider: (none)   Allergies: Corticosteroids, Prednisone    Isolation: None   Infection: None   Code Status: Prior    Ht: 162.6 cm (64\")   Wt: 80.3 kg (177 lb)    Admission Cmt: None   Principal Problem: Alcohol withdrawal [F10.939]                   Active Insurance as of 3/27/2025       Primary Coverage       Payor Plan Insurance Group Employer/Plan Group    [id number] [place] [id number] [place]        Payor Plan Address Payor Plan Phone Number Payor Plan Fax Number Effective Dates    [address]   7/1/2015 - None Entered    [facility] [id number]         Subscriber Name Subscriber Birth Date Member ID       CLARA DE LA FUENTE 1995 [id number]                     Emergency Contacts        (Rel.) Home Phone Work Phone Mobile Phone    Wallace De La Fuente (Spouse) [phone] -- [phone]    Mercy Gates (Mother) [phone] -- [phone]              Physician Progress Notes (last 5 days)  Notes from 03/29/25 0937 through 04/03/25 0937   No notes of this type exist for this encounter.       Consult Notes (last 5 days)  Notes from 03/29/25 0937 through 04/03/25 0937 "   No notes of this type exist for this encounter.          Discharge Summary        Lotus Slaughter, APRN at 25 0921              [facility] Medicine Services  DISCHARGE SUMMARY    Patient Name: Salina De La Fuente  : 1995  MRN: [id number]    Date of Admission: 3/27/2025  1:55 PM  Date of Discharge:  3/30/2025  Primary Care Physician: Loy Costa PA-C    Consults       No orders found from 2025 to 3/28/2025.            Hospital Course     Presenting Problem: Alcohol withdrawal    Active Hospital Problems    Diagnosis  POA    **Alcohol withdrawal [F10.939]  Yes    Alcohol intoxication [F10.929]  Yes      Resolved Hospital Problems   No resolved problems to display.          Hospital Course:  Salina De La Fuente is a 29 y.o. female with past medical history significant for history of anxiety and history of gestational diabetes mellitus.  Patient was admitted for alcohol withdrawal symptoms. She completed CIWA protocol with scheduled Ativan. At time of discharge, patient denies s/s withdrawal. BP and labs have improved. Patient to follow up with PCP and states she will be seeing a counselor to address her issues with alcohol.      Alcohol withdrawal  -- Ethanol < 10 on admission  - Continue daily thiamine, multivitamin, folic acid  -- Started on Coreg 3.125 mg BID for elevated BP, HR. Patient to follow up with PCP for further management of BP. Has been instructed to keep a log of BP x 1 week to take to PCP follow-up.     Hypokalemia - resolved  -K 2.8  -electrolyte replacement protocol     Anxiety  - She states that she takes no home medications at this time  -- Trial low-does Buspar TID, pt notes improvement     Fatty liver disease  - Monitored as outpatient.  - LFTs elevated, hepatocellular pattern, hep panel negative  -- LFTs improving     Leukopenia, thrombocytopenia, elevated MCV  -? Inflammatory vs viral vs infection vs other  -B12 and folate  appropriate, continue   -Peripheral blood smear pending  -WBCs, platelets now wnl      Discharge Follow Up Recommendations for outpatient labs/diagnostics:  --Follow up with PCP one week    Day of Discharge     HPI:   Patient resting in bed.  Family at bedside.  Patient says BuSpar has helped her anxiety.  She also thinks Coreg has helped and has noticed heart rate has come down.  Discussed keeping a log of her blood pressure to take to PCP follow-up.  Patient denies symptoms of alcohol withdrawal.  She appears stable for discharge home.  Vital Signs:   Temp:  [97.1 °F (36.2 °C)-98.5 °F (36.9 °C)] 98.1 °F (36.7 °C)  Heart Rate:  [] 96  Resp:  [18-20] 18  BP: (135-159)/() 135/101      Physical Exam:  Constitutional: No acute distress, awake, alert  HENT: NCAT, mucous membranes moist  Respiratory: Clear to auscultation bilaterally, respiratory effort normal, room air  Cardiovascular: RRR, no murmurs, rubs, or gallops  Gastrointestinal: Positive bowel sounds, soft, nontender, nondistended  Musculoskeletal: No bilateral ankle edema  Psychiatric: Appropriate affect, cooperative  Neurologic: Oriented x 3, moves all extremities, speech clear  Skin: No rashes    Pertinent  and/or Most Recent Results     LAB RESULTS:      Lab 03/30/25 0339 03/29/25 0346 03/28/25 0346 03/27/25  1405   WBC 3.65 2.82* 2.37* 3.57   HEMOGLOBIN 12.7 12.1 11.7* 13.9   HEMATOCRIT 38.9 36.0 35.1 40.8   PLATELETS 153 137* 139* 173   NEUTROS ABS 1.69* 0.93* 0.88* 2.11   IMMATURE GRANS (ABS) 0.03 0.03 0.01 0.02   LYMPHS ABS 1.38 1.37 1.14 0.90   MONOS ABS 0.45 0.38 0.28 0.52   EOS ABS 0.05 0.06 0.03 0.00   .8* 99.7* 100.0* 96.7         Lab 03/30/25  0339 03/28/25  0346 03/27/25  1405   SODIUM 138 139 139   POTASSIUM 3.7 2.8* 2.9*   CHLORIDE 104 99 93*   CO2 21.0* 23.0 23.4   ANION GAP 13.0 17.0* 22.6*   BUN 9 5* 5*   CREATININE 0.57 0.46* 0.53*   EGFR 124.8 131.4 127.0   GLUCOSE 145* 101* 122*   CALCIUM 9.1 8.6 9.6   MAGNESIUM  --   1.9 1.5*   PHOSPHORUS  --  3.4  --          Lab 03/30/25  0339 03/29/25  0345 03/28/25  0346 03/27/25  1405   TOTAL PROTEIN 6.2 6.5 6.3 7.8   ALBUMIN 4.0 3.9 3.7 4.5   GLOBULIN 2.2  --  2.6 3.3   ALT (SGPT) 142* 164* 214* 264*   AST (SGOT) 124* 163* 474* 393*   BILIRUBIN 0.8 1.0 1.1 1.3*   INDIRECT BILIRUBIN  --  0.5  --   --    BILIRUBIN DIRECT  --  0.5*  --   --    ALK PHOS 65 68 80 87                 Lab 03/29/25  0345   FOLATE 8.29   VITAMIN B 12 712         Brief Urine Lab Results  (Last result in the past 365 days)        Color   Clarity   Blood   Leuk Est   Nitrite   Protein   CREAT   Urine HCG        03/27/25 1543 Yellow   Clear   Negative   Negative   Positive   Trace                 Microbiology Results (last 10 days)       ** No results found for the last 240 hours. **            No radiology results for the last 10 days                Plan for Follow-up of Pending Labs/Results:   Pending Labs       Order Current Status    Peripheral Blood Smear In process          Discharge Details        Discharge Medications        New Medications        Instructions Start Date   busPIRone 5 MG tablet  Commonly known as: BUSPAR   5 mg, Oral, 3 Times Daily      carvedilol 3.125 MG tablet  Commonly known as: COREG   3.125 mg, Oral, 2 Times Daily With Meals      folic acid 1 MG tablet  Commonly known as: FOLVITE   1 mg, Oral, Daily   Start Date: March 31, 2025     Multivitamin Adult (Minerals) tablet   1 tablet, Oral, Daily   Start Date: March 31, 2025     Thiamine Mononitrate 100 MG tablet   100 mg, Oral, Daily   Start Date: April 2, 2025            Continue These Medications        Instructions Start Date   loratadine 10 MG tablet  Commonly known as: CLARITIN   10 mg, Daily      pantoprazole 40 MG EC tablet  Commonly known as: PROTONIX   40 mg, Oral, Daily             Stop These Medications      Nexplanon 68 MG implant subdermal implant  Generic drug: Etonogestrel              Allergies   Allergen Reactions     Corticosteroids Hives     With liquid version, has safely taken pill form    Prednisone Hives     Liquid prednisone         Discharge Disposition:  Home or Self Care    Diet:  Hospital:  Diet Order   Procedures    Diet: Cardiac, Diabetic; Healthy Heart (2-3 Na+); Consistent Carbohydrate; Fluid Consistency: Thin (IDDSI 0)       Diet Instructions       Diet: Regular/House Diet; Regular (IDDSI 7); Thin (IDDSI 0)      Discharge Diet: Regular/House Diet    Texture: Regular (IDDSI 7)    Fluid Consistency: Thin (IDDSI 0)             Activity:  Activity Instructions       Activity as Tolerated              Restrictions or Other Recommendations:         CODE STATUS:    Code Status and Medical Interventions: CPR (Attempt to Resuscitate); Full Support   Ordered at: 03/27/25 2495     Code Status (Patient has no pulse and is not breathing):    CPR (Attempt to Resuscitate)     Medical Interventions (Patient has pulse or is breathing):    Full Support     Level Of Support Discussed With:    Patient       No future appointments.    Additional Instructions for the Follow-ups that You Need to Schedule       Discharge Follow-up with PCP   As directed       Currently Documented PCP:    Loy Costa PA-C    PCP Phone Number:    [phone]     Follow Up Details: One week for post hospitalization follow up                  SHIRIN Morfin  03/30/25      Time Spent on Discharge:  I spent  35  minutes on this discharge activity which included: face-to-face encounter with the patient, reviewing the data in the system, coordination of the care with the nursing staff as well as consultants, documentation, and entering orders.            Electronically signed by Lotus Slaughter APRN at 03/30/25 1234       Discharge Order (From admission, onward)       Start     Ordered    03/30/25 0916  Discharge patient  Once        Expected Discharge Date: 03/30/25   Discharge Disposition: Home or Self Care   Physician of Record for  Attribution - Please select from Treatment Team: KAVEH CARRILLO [[id number]]   Review needed by CMO to determine Physician of Record: No      Question Answer Comment   Physician of Record for Attribution - Please select from Treatment Team KAVEH CARRILLO    Review needed by CMO to determine Physician of Record No        03/30/25 0907

## 2025-04-07 ENCOUNTER — OFFICE VISIT (OUTPATIENT)
Dept: INTERNAL MEDICINE | Facility: CLINIC | Age: 30
End: 2025-04-07
Payer: COMMERCIAL

## 2025-04-07 ENCOUNTER — LAB (OUTPATIENT)
Dept: INTERNAL MEDICINE | Facility: CLINIC | Age: 30
End: 2025-04-07
Payer: COMMERCIAL

## 2025-04-07 VITALS
HEART RATE: 88 BPM | DIASTOLIC BLOOD PRESSURE: 94 MMHG | SYSTOLIC BLOOD PRESSURE: 148 MMHG | HEIGHT: 64 IN | OXYGEN SATURATION: 99 % | WEIGHT: 183.6 LBS | BODY MASS INDEX: 31.34 KG/M2 | TEMPERATURE: 96.8 F

## 2025-04-07 DIAGNOSIS — F41.1 GAD (GENERALIZED ANXIETY DISORDER): ICD-10-CM

## 2025-04-07 DIAGNOSIS — K21.9 GASTROESOPHAGEAL REFLUX DISEASE, UNSPECIFIED WHETHER ESOPHAGITIS PRESENT: ICD-10-CM

## 2025-04-07 DIAGNOSIS — I10 PRIMARY HYPERTENSION: ICD-10-CM

## 2025-04-07 DIAGNOSIS — F10.930 ALCOHOL WITHDRAWAL SYNDROME WITHOUT COMPLICATION: Primary | ICD-10-CM

## 2025-04-07 DIAGNOSIS — Z00.00 ANNUAL PHYSICAL EXAM: ICD-10-CM

## 2025-04-07 LAB
DEPRECATED RDW RBC AUTO: 49.3 FL (ref 37–54)
ERYTHROCYTE [DISTWIDTH] IN BLOOD BY AUTOMATED COUNT: 13.9 % (ref 12.3–15.4)
HBA1C MFR BLD: 5.1 % (ref 4.8–5.6)
HCT VFR BLD AUTO: 37.1 % (ref 34–46.6)
HGB BLD-MCNC: 13 G/DL (ref 12–15.9)
MCH RBC QN AUTO: 34.4 PG (ref 26.6–33)
MCHC RBC AUTO-ENTMCNC: 35 G/DL (ref 31.5–35.7)
MCV RBC AUTO: 98.1 FL (ref 79–97)
PLATELET # BLD AUTO: 228 10*3/MM3 (ref 140–450)
PMV BLD AUTO: 11.4 FL (ref 6–12)
RBC # BLD AUTO: 3.78 10*6/MM3 (ref 3.77–5.28)
WBC NRBC COR # BLD AUTO: 4.54 10*3/MM3 (ref 3.4–10.8)

## 2025-04-07 PROCEDURE — 82607 VITAMIN B-12: CPT | Performed by: PHYSICIAN ASSISTANT

## 2025-04-07 PROCEDURE — 83036 HEMOGLOBIN GLYCOSYLATED A1C: CPT | Performed by: PHYSICIAN ASSISTANT

## 2025-04-07 PROCEDURE — 82746 ASSAY OF FOLIC ACID SERUM: CPT | Performed by: PHYSICIAN ASSISTANT

## 2025-04-07 PROCEDURE — 84443 ASSAY THYROID STIM HORMONE: CPT | Performed by: PHYSICIAN ASSISTANT

## 2025-04-07 PROCEDURE — 82306 VITAMIN D 25 HYDROXY: CPT | Performed by: PHYSICIAN ASSISTANT

## 2025-04-07 PROCEDURE — 80053 COMPREHEN METABOLIC PANEL: CPT | Performed by: PHYSICIAN ASSISTANT

## 2025-04-07 PROCEDURE — 36415 COLL VENOUS BLD VENIPUNCTURE: CPT | Performed by: PHYSICIAN ASSISTANT

## 2025-04-07 PROCEDURE — 85027 COMPLETE CBC AUTOMATED: CPT | Performed by: PHYSICIAN ASSISTANT

## 2025-04-07 RX ORDER — LOSARTAN POTASSIUM 25 MG/1
25 TABLET ORAL DAILY
Qty: 30 TABLET | Refills: 1 | Status: SHIPPED | OUTPATIENT
Start: 2025-04-07

## 2025-04-07 RX ORDER — BUSPIRONE HYDROCHLORIDE 10 MG/1
10 TABLET ORAL 3 TIMES DAILY
Qty: 90 TABLET | Refills: 1 | Status: SHIPPED | OUTPATIENT
Start: 2025-04-07

## 2025-04-07 RX ORDER — NAPROXEN 500 MG/1
TABLET ORAL
COMMUNITY
Start: 2025-04-03

## 2025-04-07 NOTE — PROGRESS NOTES
"Transitional Care Follow Up Visit  Subjective     Salina De La Fuente is a 29 y.o. female who presents for a transitional care management visit.    Within 48 business hours after discharge our office contacted her via telephone to coordinate her care and needs.      I reviewed and discussed the details of that call along with the discharge summary, hospital problems, inpatient lab results, inpatient diagnostic studies, and consultation reports with Salina.     Current outpatient and discharge medications have been reconciled for the patient.  Reviewed by: Loy Costa PA-C          3/30/2025    11:36 AM   Date of TCM Phone Call   [facility]   Date of Admission 3/27/2025   Date of Discharge 3/30/2025   Discharge Disposition Home or Self Care     Risk for Readmission (LACE) Score: 6 (3/30/2025  6:00 AM)      History of Present Illness  Patient 29-year-old female in today for hospital discharge follow-up for alcohol withdrawal Hospital course was as follows:.\"Salina De La Fuente is a 29 y.o. female with past medical history significant for history of anxiety and history of gestational diabetes mellitus.  Patient was admitted for alcohol withdrawal symptoms. She completed CIWA protocol with scheduled Ativan. At time of discharge, patient denies s/s withdrawal. BP and labs have improved. Patient to follow up with PCP and states she will be seeing a counselor to address her issues with alcohol.      Alcohol withdrawal  -- Ethanol < 10 on admission  - Continue daily thiamine, multivitamin, folic acid  -- Started on Coreg 3.125 mg BID for elevated BP, HR. Patient to follow up with PCP for further management of BP. Has been instructed to keep a log of BP x 1 week to take to PCP follow-up.     Hypokalemia - resolved  -K 2.8  -electrolyte replacement protocol     Anxiety  - She states that she takes no home medications at this time  -- Trial low-does Buspar TID, pt notes improvement   " "  Fatty liver disease  - Monitored as outpatient.  - LFTs elevated, hepatocellular pattern, hep panel negative  -- LFTs improving     Leukopenia, thrombocytopenia, elevated MCV  -? Inflammatory vs viral vs infection vs other  -B12 and folate appropriate, continue   -Peripheral blood smear pending  -WBCs, platelets now wnl.\"   Patient still abstaining from alcohol altogether.  Overall feeling better.       The following portions of the patient's history were reviewed and updated as appropriate: allergies, current medications, past family history, past medical history, past social history, past surgical history, and problem list.    Review of Systems   Constitutional:  Negative for activity change, chills, fatigue, fever and unexpected weight change.   HENT:  Negative for congestion, ear pain, postnasal drip, sinus pressure and sore throat.    Eyes:  Negative for pain, discharge and redness.   Respiratory:  Negative for cough, shortness of breath and wheezing.    Cardiovascular:  Negative for chest pain, palpitations and leg swelling.   Gastrointestinal:  Negative for diarrhea, nausea and vomiting.   Endocrine: Negative for cold intolerance and heat intolerance.   Genitourinary:  Negative for decreased urine volume and dysuria.   Musculoskeletal:  Negative for arthralgias and myalgias.   Skin:  Negative for rash and wound.   Neurological:  Negative for dizziness, light-headedness and headaches.   Hematological:  Does not bruise/bleed easily.   Psychiatric/Behavioral:  Negative for confusion, dysphoric mood and sleep disturbance. The patient is not nervous/anxious.        Objective   /94 (BP Location: Left arm, Patient Position: Sitting, Cuff Size: Large Adult)   Pulse 88   Temp 96.8 °F (36 °C) (Temporal)   Ht 162.6 cm (64.02\")   Wt 83.3 kg (183 lb 9.6 oz)   SpO2 99%   BMI 31.50 kg/m²   Physical Exam  Vitals and nursing note reviewed.   Constitutional:       General: She is not in acute distress.     " Appearance: She is not ill-appearing.   HENT:      Head: Normocephalic.      Right Ear: Tympanic membrane, ear canal and external ear normal. There is no impacted cerumen.      Left Ear: Tympanic membrane, ear canal and external ear normal. There is no impacted cerumen.      Nose: No congestion or rhinorrhea.      Mouth/Throat:      Mouth: Mucous membranes are moist.      Pharynx: Oropharynx is clear. No oropharyngeal exudate or posterior oropharyngeal erythema.   Eyes:      General:         Right eye: No discharge.         Left eye: No discharge.      Extraocular Movements: Extraocular movements intact.      Conjunctiva/sclera: Conjunctivae normal.      Pupils: Pupils are equal, round, and reactive to light.   Cardiovascular:      Rate and Rhythm: Normal rate and regular rhythm.      Heart sounds: Normal heart sounds. No murmur heard.     No friction rub. No gallop.   Pulmonary:      Effort: Pulmonary effort is normal. No respiratory distress.      Breath sounds: Normal breath sounds. No wheezing.   Abdominal:      General: Bowel sounds are normal. There is no distension.      Palpations: Abdomen is soft. There is no mass.      Tenderness: There is no abdominal tenderness.   Musculoskeletal:         General: No swelling. Normal range of motion.      Cervical back: Normal range of motion. No tenderness.      Right lower leg: No edema.      Left lower leg: No edema.   Lymphadenopathy:      Cervical: No cervical adenopathy.   Skin:     Findings: No bruising, erythema or rash.   Neurological:      Mental Status: She is oriented to person, place, and time.      Gait: Gait normal.   Psychiatric:         Mood and Affect: Mood normal.         Behavior: Behavior normal.         Thought Content: Thought content normal.         Judgment: Judgment normal.         Assessment & Plan   Diagnoses and all orders for this visit:    1. Alcohol withdrawal syndrome without complication (Primary)- overall better.  Advised if  symptoms/condition does not continue to improve or worsen to go back to ER immediately.  Repeat CBC and CMP. Patient verbalized understanding of all instructions given and complied.     2. JOSE RAMON (generalized anxiety disorder)- better but still uncontrolled.  Increase BuSpar to 10 mg 3 times daily.    3. Gastroesophageal reflux disease, unspecified whether esophagitis present- stable on Protonix.    4. Primary hypertension- trial of losartan uncontrolled, start losartan 25 mg daily. Advised patient to take blood pressure readings at home 2-3 times daily. Advised if blood pressure higher than 160/100 or lower than 100/60 to call the office immediately. If symptomatic, go to the ER. Patient verbalized understanding of all instructions given and complied.

## 2025-04-08 LAB
25(OH)D3 SERPL-MCNC: 21.4 NG/ML (ref 30–100)
ALBUMIN SERPL-MCNC: 4.1 G/DL (ref 3.5–5.2)
ALBUMIN/GLOB SERPL: 1.5 G/DL
ALP SERPL-CCNC: 61 U/L (ref 39–117)
ALT SERPL W P-5'-P-CCNC: 81 U/L (ref 1–33)
ANION GAP SERPL CALCULATED.3IONS-SCNC: 13 MMOL/L (ref 5–15)
AST SERPL-CCNC: 57 U/L (ref 1–32)
BILIRUB SERPL-MCNC: 0.5 MG/DL (ref 0–1.2)
BUN SERPL-MCNC: 11 MG/DL (ref 6–20)
BUN/CREAT SERPL: 15.5 (ref 7–25)
CALCIUM SPEC-SCNC: 9.4 MG/DL (ref 8.6–10.5)
CHLORIDE SERPL-SCNC: 101 MMOL/L (ref 98–107)
CO2 SERPL-SCNC: 24 MMOL/L (ref 22–29)
CREAT SERPL-MCNC: 0.71 MG/DL (ref 0.57–1)
EGFRCR SERPLBLD CKD-EPI 2021: 116.7 ML/MIN/1.73
FOLATE SERPL-MCNC: >20 NG/ML (ref 4.78–24.2)
GLOBULIN UR ELPH-MCNC: 2.8 GM/DL
GLUCOSE SERPL-MCNC: 93 MG/DL (ref 65–99)
POTASSIUM SERPL-SCNC: 3.7 MMOL/L (ref 3.5–5.2)
PROT SERPL-MCNC: 6.9 G/DL (ref 6–8.5)
SODIUM SERPL-SCNC: 138 MMOL/L (ref 136–145)
TSH SERPL DL<=0.05 MIU/L-ACNC: 1.19 UIU/ML (ref 0.27–4.2)
VIT B12 BLD-MCNC: 689 PG/ML (ref 211–946)

## 2025-04-14 DIAGNOSIS — F41.1 GENERALIZED ANXIETY DISORDER: ICD-10-CM

## 2025-04-14 RX ORDER — PROPRANOLOL HCL 20 MG
20 TABLET ORAL 3 TIMES DAILY
Qty: 270 TABLET | OUTPATIENT
Start: 2025-04-14

## 2025-04-22 ENCOUNTER — OFFICE VISIT (OUTPATIENT)
Dept: INTERNAL MEDICINE | Facility: CLINIC | Age: 30
End: 2025-04-22
Payer: COMMERCIAL

## 2025-04-22 VITALS
HEIGHT: 64 IN | TEMPERATURE: 97.3 F | DIASTOLIC BLOOD PRESSURE: 82 MMHG | SYSTOLIC BLOOD PRESSURE: 128 MMHG | HEART RATE: 112 BPM | WEIGHT: 178 LBS | OXYGEN SATURATION: 98 % | BODY MASS INDEX: 30.39 KG/M2

## 2025-04-22 DIAGNOSIS — F41.1 GAD (GENERALIZED ANXIETY DISORDER): ICD-10-CM

## 2025-04-22 DIAGNOSIS — I10 PRIMARY HYPERTENSION: ICD-10-CM

## 2025-04-22 DIAGNOSIS — K21.9 GASTROESOPHAGEAL REFLUX DISEASE, UNSPECIFIED WHETHER ESOPHAGITIS PRESENT: ICD-10-CM

## 2025-04-22 DIAGNOSIS — F10.930 ALCOHOL WITHDRAWAL SYNDROME WITHOUT COMPLICATION: Primary | ICD-10-CM

## 2025-04-22 DIAGNOSIS — G47.00 INSOMNIA, UNSPECIFIED TYPE: ICD-10-CM

## 2025-04-22 PROCEDURE — 1159F MED LIST DOCD IN RCRD: CPT | Performed by: PHYSICIAN ASSISTANT

## 2025-04-22 PROCEDURE — 1160F RVW MEDS BY RX/DR IN RCRD: CPT | Performed by: PHYSICIAN ASSISTANT

## 2025-04-22 PROCEDURE — 99214 OFFICE O/P EST MOD 30 MIN: CPT | Performed by: PHYSICIAN ASSISTANT

## 2025-04-22 RX ORDER — PROPRANOLOL HYDROCHLORIDE 10 MG/1
10 TABLET ORAL 3 TIMES DAILY PRN
Qty: 90 TABLET | Refills: 2 | Status: SHIPPED | OUTPATIENT
Start: 2025-04-22

## 2025-04-22 RX ORDER — DOXEPIN HYDROCHLORIDE 10 MG/1
10 CAPSULE ORAL NIGHTLY
Qty: 30 CAPSULE | Refills: 1 | Status: SHIPPED | OUTPATIENT
Start: 2025-04-22

## 2025-04-22 NOTE — PROGRESS NOTES
[facility]  [address]  [facility] [address]  Dept: [phone]  Dept Fax: [phone]  Loc: [phone]  Loc Fax: [phone]    Salina De La Fuente  1995    Follow Up Office Visit Note    History of Present Illness:  Patient 29-year-old female in today to follow-up for alcohol withdrawal syndrome, anxiety, and insomnia.  Still abstaining from alcohol.  Intent on sobriety.  On BuSpar 10 mg 3 times daily as needed.  Still having some breakthrough symptoms.  Heart rate high as well.  Blood pressure doing good.  Still cannot sleep very well at night.  Trazodone has been hit and miss in the past for efficacy.        The following portions of the patient's history were reviewed and updated as appropriate: allergies, current medications, past family history, past medical history, past social history, past surgical history, and problem list.    Medications:    Current Outpatient Medications:     busPIRone (BUSPAR) 10 MG tablet, Take 1 tablet by mouth 3 (Three) Times a Day., Disp: 90 tablet, Rfl: 1    folic acid (FOLVITE) 1 MG tablet, Take 1 tablet by mouth Daily., Disp: 30 tablet, Rfl: 0    loratadine (CLARITIN) 10 MG tablet, Take 1 tablet by mouth Daily., Disp: , Rfl:     losartan (COZAAR) 25 MG tablet, Take 1 tablet by mouth Daily., Disp: 30 tablet, Rfl: 1    multivitamin with minerals tablet tablet, Take 1 tablet by mouth Daily., Disp: 30 tablet, Rfl: 0    naproxen (NAPROSYN) 500 MG tablet, , Disp: , Rfl:     pantoprazole (PROTONIX) 40 MG EC tablet, Take 1 tablet by mouth Daily., Disp: 30 tablet, Rfl: 3    Thiamine Mononitrate 100 MG tablet, Take 1 tablet by mouth Daily., Disp: 30 tablet, Rfl: 0    doxepin (SINEquan) 10 MG capsule, Take 1 capsule by mouth Every Night., Disp: 30 capsule, Rfl: 1    propranolol (INDERAL) 10 MG tablet, Take 1 tablet by mouth 3 (Three) Times a Day As Needed (anxiety)., Disp: 90 tablet, Rfl: 2    Subjective  Allergies    Allergen Reactions    Corticosteroids Hives     With liquid version, has safely taken pill form    Prednisone Hives     Liquid prednisone        Past Medical History:   Diagnosis Date    Anxiety     as a teenager    Asthma     never actually diagnosed    Eczema of both hands     History of gestational diabetes mellitus     2016    Kidney stone 01/2018    Nexplanon in place        History reviewed. No pertinent surgical history.    Family History   Problem Relation Age of Onset    Stroke Father     Thyroid disease Mother     Breast cancer Paternal Grandmother     Cervical cancer Paternal Grandmother     Diabetes Paternal Grandmother     Uterine cancer Paternal Grandmother     Ovarian cancer Neg Hx     Colon cancer Neg Hx         Social History     Socioeconomic History    Marital status:    Tobacco Use    Smoking status: Never    Smokeless tobacco: Never   Vaping Use    Vaping status: Never Used   Substance and Sexual Activity    Alcohol use: Yes     Comment: daily vodka 1/2 1/5    Drug use: No    Sexual activity: Yes     Partners: Male     Birth control/protection: Nexplanon       Review of Systems   Constitutional:  Negative for activity change, chills, fatigue, fever and unexpected weight change.   HENT:  Negative for congestion, ear pain, postnasal drip, sinus pressure and sore throat.    Eyes:  Negative for pain, discharge and redness.   Respiratory:  Negative for cough, shortness of breath and wheezing.    Cardiovascular:  Negative for chest pain, palpitations and leg swelling.   Gastrointestinal:  Negative for diarrhea, nausea and vomiting.   Endocrine: Negative for cold intolerance and heat intolerance.   Genitourinary:  Negative for decreased urine volume and dysuria.   Musculoskeletal:  Negative for arthralgias and myalgias.   Skin:  Negative for rash and wound.   Neurological:  Negative for dizziness, light-headedness and headaches.   Hematological:  Does not bruise/bleed easily.  "  Psychiatric/Behavioral:  Positive for sleep disturbance. Negative for confusion, dysphoric mood, self-injury and suicidal ideas. The patient is nervous/anxious.          Objective  Vitals:    04/22/25 1039   BP: 128/82   BP Location: Left arm   Patient Position: Sitting   Cuff Size: Large Adult   Pulse: 112   Temp: 97.3 °F (36.3 °C)   TempSrc: Temporal   SpO2: 98%   Weight: 80.7 kg (178 lb)   Height: 162.6 cm (64.02\")     Body mass index is 30.54 kg/m².     Physical Exam  Physical Exam  Vitals and nursing note reviewed.   Constitutional:       General: She is not in acute distress.     Appearance: She is not ill-appearing.   HENT:      Head: Normocephalic.      Right Ear: Tympanic membrane, ear canal and external ear normal. There is no impacted cerumen.      Left Ear: Tympanic membrane, ear canal and external ear normal. There is no impacted cerumen.      Nose: No congestion or rhinorrhea.      Mouth/Throat:      Mouth: Mucous membranes are moist.      Pharynx: Oropharynx is clear. No oropharyngeal exudate or posterior oropharyngeal erythema.   Eyes:      General:         Right eye: No discharge.         Left eye: No discharge.      Extraocular Movements: Extraocular movements intact.      Conjunctiva/sclera: Conjunctivae normal.      Pupils: Pupils are equal, round, and reactive to light.   Cardiovascular:      Rate and Rhythm: Normal rate and regular rhythm.      Heart sounds: Normal heart sounds. No murmur heard.     No friction rub. No gallop.   Pulmonary:      Effort: Pulmonary effort is normal. No respiratory distress.      Breath sounds: Normal breath sounds. No wheezing.   Abdominal:      General: Bowel sounds are normal. There is no distension.      Palpations: Abdomen is soft. There is no mass.      Tenderness: There is no abdominal tenderness.   Musculoskeletal:         General: No swelling. Normal range of motion.      Cervical back: Normal range of motion. No tenderness.      Right lower leg: No " edema.      Left lower leg: No edema.   Lymphadenopathy:      Cervical: No cervical adenopathy.   Skin:     Findings: No bruising, erythema or rash.   Neurological:      Mental Status: She is oriented to person, place, and time.      Gait: Gait normal.   Psychiatric:         Mood and Affect: Mood normal.         Behavior: Behavior normal.         Thought Content: Thought content normal.         Judgment: Judgment normal.         Diagnostic Data  Procedures    Assessment  Diagnoses and all orders for this visit:    1. Alcohol withdrawal syndrome without complication (Primary)    2. JOSE RAMON (generalized anxiety disorder)    3. Gastroesophageal reflux disease, unspecified whether esophagitis present    4. Primary hypertension    5. Insomnia, unspecified type    Other orders  -     propranolol (INDERAL) 10 MG tablet; Take 1 tablet by mouth 3 (Three) Times a Day As Needed (anxiety).  Dispense: 90 tablet; Refill: 2  -     doxepin (SINEquan) 10 MG capsule; Take 1 capsule by mouth Every Night.  Dispense: 30 capsule; Refill: 1        Plan    1. Alcohol withdrawal syndrome without complication (Primary)- maintaining sobriety and intent on sobriety.    2. JOSE RAMON (generalized anxiety disorder)- uncontrolled on BuSpar.  In the presence of tachycardia.  Discontinue carvedilol and changed to propranolol 10 mg 3 times daily.    3. Gastroesophageal reflux disease, unspecified whether esophagitis present- stable on Protonix.    4. Primary hypertension- well-controlled on losartan.    5. Insomnia, unspecified type- trial of doxepin.      Return in about 2 weeks (around 5/6/2025) for Recheck.    Loy Costa PA-C  04/22/2025

## 2025-06-20 RX ORDER — PANTOPRAZOLE SODIUM 40 MG/1
40 TABLET, DELAYED RELEASE ORAL DAILY
Qty: 30 TABLET | Refills: 3 | Status: SHIPPED | OUTPATIENT
Start: 2025-06-20

## 2025-06-30 DIAGNOSIS — F41.1 GENERALIZED ANXIETY DISORDER: ICD-10-CM

## 2025-06-30 RX ORDER — BUSPIRONE HYDROCHLORIDE 10 MG/1
10 TABLET ORAL 3 TIMES DAILY
Qty: 90 TABLET | Refills: 1 | Status: SHIPPED | OUTPATIENT
Start: 2025-06-30

## 2025-06-30 RX ORDER — LOSARTAN POTASSIUM 25 MG/1
25 TABLET ORAL DAILY
Qty: 30 TABLET | Refills: 1 | Status: SHIPPED | OUTPATIENT
Start: 2025-06-30

## 2025-06-30 RX ORDER — PROPRANOLOL HCL 20 MG
20 TABLET ORAL 3 TIMES DAILY
Qty: 90 TABLET | Refills: 3 | OUTPATIENT
Start: 2025-06-30

## 2025-06-30 RX ORDER — DOXEPIN HYDROCHLORIDE 10 MG/1
CAPSULE ORAL
Qty: 30 CAPSULE | Refills: 1 | Status: SHIPPED | OUTPATIENT
Start: 2025-06-30

## 2025-08-01 ENCOUNTER — APPOINTMENT (OUTPATIENT)
Dept: CT IMAGING | Facility: HOSPITAL | Age: 30
DRG: 073 | End: 2025-08-01
Payer: COMMERCIAL

## 2025-08-01 ENCOUNTER — HOSPITAL ENCOUNTER (INPATIENT)
Facility: HOSPITAL | Age: 30
LOS: 9 days | Discharge: NURSING, REHAB OR LONG-TERM CARE | DRG: 073 | End: 2025-08-11
Attending: EMERGENCY MEDICINE
Payer: COMMERCIAL

## 2025-08-01 DIAGNOSIS — R53.1 GENERALIZED WEAKNESS: ICD-10-CM

## 2025-08-01 DIAGNOSIS — F10.939 ALCOHOL WITHDRAWAL SYNDROME WITH COMPLICATION: Primary | ICD-10-CM

## 2025-08-01 DIAGNOSIS — E87.6 HYPOKALEMIA: ICD-10-CM

## 2025-08-01 DIAGNOSIS — E87.1 HYPONATREMIA: ICD-10-CM

## 2025-08-01 DIAGNOSIS — E83.42 HYPOMAGNESEMIA: ICD-10-CM

## 2025-08-01 DIAGNOSIS — G62.1 ALCOHOLIC PERIPHERAL NEUROPATHY: ICD-10-CM

## 2025-08-01 DIAGNOSIS — D64.9 SYMPTOMATIC ANEMIA: ICD-10-CM

## 2025-08-01 LAB
ABO GROUP BLD: NORMAL
ALBUMIN SERPL-MCNC: 3.4 G/DL (ref 3.5–5.2)
ALBUMIN/GLOB SERPL: 1 G/DL
ALP SERPL-CCNC: 172 U/L (ref 39–117)
ALT SERPL W P-5'-P-CCNC: 34 U/L (ref 1–33)
ANION GAP SERPL CALCULATED.3IONS-SCNC: 18 MMOL/L (ref 5–15)
AST SERPL-CCNC: 142 U/L (ref 1–32)
BASOPHILS # BLD AUTO: 0.02 10*3/MM3 (ref 0–0.2)
BASOPHILS NFR BLD AUTO: 0.3 % (ref 0–1.5)
BILIRUB SERPL-MCNC: 1.7 MG/DL (ref 0–1.2)
BLD GP AB SCN SERPL QL: NEGATIVE
BUN SERPL-MCNC: 28.1 MG/DL (ref 6–20)
BUN/CREAT SERPL: 23.4 (ref 7–25)
CALCIUM SPEC-SCNC: 7.8 MG/DL (ref 8.6–10.5)
CHLORIDE SERPL-SCNC: 81 MMOL/L (ref 98–107)
CO2 SERPL-SCNC: 30 MMOL/L (ref 22–29)
CREAT SERPL-MCNC: 1.2 MG/DL (ref 0.57–1)
D-LACTATE SERPL-SCNC: 2 MMOL/L (ref 0.5–2)
DEPRECATED RDW RBC AUTO: 47.6 FL (ref 37–54)
EGFRCR SERPLBLD CKD-EPI 2021: 61.8 ML/MIN/1.73
EOSINOPHIL # BLD AUTO: 0 10*3/MM3 (ref 0–0.4)
EOSINOPHIL NFR BLD AUTO: 0 % (ref 0.3–6.2)
ERYTHROCYTE [DISTWIDTH] IN BLOOD BY AUTOMATED COUNT: 13.2 % (ref 12.3–15.4)
ETHANOL BLD-MCNC: <10 MG/DL (ref 0–10)
GLOBULIN UR ELPH-MCNC: 3.3 GM/DL
GLUCOSE SERPL-MCNC: 154 MG/DL (ref 65–99)
HCG INTACT+B SERPL-ACNC: <1 MIU/ML
HCT VFR BLD AUTO: 17.9 % (ref 34–46.6)
HGB BLD-MCNC: 6.1 G/DL (ref 12–15.9)
IMM GRANULOCYTES # BLD AUTO: 0.06 10*3/MM3 (ref 0–0.05)
IMM GRANULOCYTES NFR BLD AUTO: 1 % (ref 0–0.5)
LIPASE SERPL-CCNC: 917 U/L (ref 13–60)
LYMPHOCYTES # BLD AUTO: 0.88 10*3/MM3 (ref 0.7–3.1)
LYMPHOCYTES NFR BLD AUTO: 15.2 % (ref 19.6–45.3)
MAGNESIUM SERPL-MCNC: 1.3 MG/DL (ref 1.6–2.6)
MCH RBC QN AUTO: 33.7 PG (ref 26.6–33)
MCHC RBC AUTO-ENTMCNC: 34.1 G/DL (ref 31.5–35.7)
MCV RBC AUTO: 98.9 FL (ref 79–97)
MONOCYTES # BLD AUTO: 0.61 10*3/MM3 (ref 0.1–0.9)
MONOCYTES NFR BLD AUTO: 10.6 % (ref 5–12)
NEUTROPHILS NFR BLD AUTO: 4.21 10*3/MM3 (ref 1.7–7)
NEUTROPHILS NFR BLD AUTO: 72.9 % (ref 42.7–76)
NRBC BLD AUTO-RTO: 0 /100 WBC (ref 0–0.2)
PLATELET # BLD AUTO: 205 10*3/MM3 (ref 140–450)
PMV BLD AUTO: 12.4 FL (ref 6–12)
POTASSIUM SERPL-SCNC: 2.3 MMOL/L (ref 3.5–5.2)
PROT SERPL-MCNC: 6.7 G/DL (ref 6–8.5)
RBC # BLD AUTO: 1.81 10*6/MM3 (ref 3.77–5.28)
RH BLD: POSITIVE
SODIUM SERPL-SCNC: 129 MMOL/L (ref 136–145)
T&S EXPIRATION DATE: NORMAL
WBC NRBC COR # BLD AUTO: 5.78 10*3/MM3 (ref 3.4–10.8)

## 2025-08-01 PROCEDURE — 83605 ASSAY OF LACTIC ACID: CPT | Performed by: PHYSICIAN ASSISTANT

## 2025-08-01 PROCEDURE — 86900 BLOOD TYPING SEROLOGIC ABO: CPT

## 2025-08-01 PROCEDURE — 93005 ELECTROCARDIOGRAM TRACING: CPT | Performed by: PHYSICIAN ASSISTANT

## 2025-08-01 PROCEDURE — 84443 ASSAY THYROID STIM HORMONE: CPT

## 2025-08-01 PROCEDURE — 25510000001 IOPAMIDOL 61 % SOLUTION: Performed by: EMERGENCY MEDICINE

## 2025-08-01 PROCEDURE — P9016 RBC LEUKOCYTES REDUCED: HCPCS

## 2025-08-01 PROCEDURE — 25810000003 SODIUM CHLORIDE 0.9 % SOLUTION: Performed by: PHYSICIAN ASSISTANT

## 2025-08-01 PROCEDURE — 86900 BLOOD TYPING SEROLOGIC ABO: CPT | Performed by: PHYSICIAN ASSISTANT

## 2025-08-01 PROCEDURE — 83735 ASSAY OF MAGNESIUM: CPT | Performed by: PHYSICIAN ASSISTANT

## 2025-08-01 PROCEDURE — 80053 COMPREHEN METABOLIC PANEL: CPT | Performed by: PHYSICIAN ASSISTANT

## 2025-08-01 PROCEDURE — 86850 RBC ANTIBODY SCREEN: CPT | Performed by: PHYSICIAN ASSISTANT

## 2025-08-01 PROCEDURE — 25010000002 THIAMINE HCL 200 MG/2ML SOLUTION: Performed by: PHYSICIAN ASSISTANT

## 2025-08-01 PROCEDURE — 36415 COLL VENOUS BLD VENIPUNCTURE: CPT

## 2025-08-01 PROCEDURE — 84702 CHORIONIC GONADOTROPIN TEST: CPT | Performed by: EMERGENCY MEDICINE

## 2025-08-01 PROCEDURE — 84145 PROCALCITONIN (PCT): CPT

## 2025-08-01 PROCEDURE — 84478 ASSAY OF TRIGLYCERIDES: CPT

## 2025-08-01 PROCEDURE — 82077 ASSAY SPEC XCP UR&BREATH IA: CPT | Performed by: PHYSICIAN ASSISTANT

## 2025-08-01 PROCEDURE — 86901 BLOOD TYPING SEROLOGIC RH(D): CPT | Performed by: PHYSICIAN ASSISTANT

## 2025-08-01 PROCEDURE — 85025 COMPLETE CBC W/AUTO DIFF WBC: CPT | Performed by: PHYSICIAN ASSISTANT

## 2025-08-01 PROCEDURE — 84100 ASSAY OF PHOSPHORUS: CPT

## 2025-08-01 PROCEDURE — 86923 COMPATIBILITY TEST ELECTRIC: CPT

## 2025-08-01 PROCEDURE — 83690 ASSAY OF LIPASE: CPT | Performed by: PHYSICIAN ASSISTANT

## 2025-08-01 PROCEDURE — 25010000002 ONDANSETRON PER 1 MG: Performed by: PHYSICIAN ASSISTANT

## 2025-08-01 PROCEDURE — 25010000002 DIAZEPAM PER 5 MG: Performed by: EMERGENCY MEDICINE

## 2025-08-01 PROCEDURE — 74177 CT ABD & PELVIS W/CONTRAST: CPT

## 2025-08-01 PROCEDURE — 99291 CRITICAL CARE FIRST HOUR: CPT

## 2025-08-01 PROCEDURE — 36430 TRANSFUSION BLD/BLD COMPNT: CPT

## 2025-08-01 RX ORDER — DIAZEPAM 10 MG/2ML
5 INJECTION, SOLUTION INTRAMUSCULAR; INTRAVENOUS EVERY 6 HOURS
Status: DISCONTINUED | OUTPATIENT
Start: 2025-08-01 | End: 2025-08-02

## 2025-08-01 RX ORDER — THIAMINE HYDROCHLORIDE 100 MG/ML
200 INJECTION, SOLUTION INTRAMUSCULAR; INTRAVENOUS EVERY 8 HOURS SCHEDULED
Status: DISPENSED | OUTPATIENT
Start: 2025-08-01 | End: 2025-08-06

## 2025-08-01 RX ORDER — IOPAMIDOL 612 MG/ML
100 INJECTION, SOLUTION INTRAVASCULAR
Status: COMPLETED | OUTPATIENT
Start: 2025-08-01 | End: 2025-08-01

## 2025-08-01 RX ORDER — ACETAMINOPHEN 500 MG
1000 TABLET ORAL ONCE
Status: COMPLETED | OUTPATIENT
Start: 2025-08-01 | End: 2025-08-01

## 2025-08-01 RX ORDER — SODIUM CHLORIDE 0.9 % (FLUSH) 0.9 %
10 SYRINGE (ML) INJECTION AS NEEDED
Status: DISCONTINUED | OUTPATIENT
Start: 2025-08-01 | End: 2025-08-02

## 2025-08-01 RX ORDER — ONDANSETRON 2 MG/ML
4 INJECTION INTRAMUSCULAR; INTRAVENOUS ONCE
Status: COMPLETED | OUTPATIENT
Start: 2025-08-01 | End: 2025-08-01

## 2025-08-01 RX ORDER — THIAMINE HYDROCHLORIDE 100 MG/ML
200 INJECTION, SOLUTION INTRAMUSCULAR; INTRAVENOUS ONCE
Status: COMPLETED | OUTPATIENT
Start: 2025-08-01 | End: 2025-08-01

## 2025-08-01 RX ORDER — DIAZEPAM 10 MG/2ML
2.5 INJECTION, SOLUTION INTRAMUSCULAR; INTRAVENOUS EVERY 6 HOURS
Status: DISCONTINUED | OUTPATIENT
Start: 2025-08-02 | End: 2025-08-02

## 2025-08-01 RX ADMIN — IOPAMIDOL 100 ML: 612 INJECTION, SOLUTION INTRAVENOUS at 23:06

## 2025-08-01 RX ADMIN — ACETAMINOPHEN 1000 MG: 500 TABLET, FILM COATED ORAL at 22:46

## 2025-08-01 RX ADMIN — ONDANSETRON 4 MG: 2 INJECTION INTRAMUSCULAR; INTRAVENOUS at 19:51

## 2025-08-01 RX ADMIN — DIAZEPAM 5 MG: 5 INJECTION, SOLUTION INTRAMUSCULAR; INTRAVENOUS at 22:46

## 2025-08-01 RX ADMIN — THIAMINE HYDROCHLORIDE 200 MG: 100 INJECTION, SOLUTION INTRAMUSCULAR; INTRAVENOUS at 20:27

## 2025-08-01 RX ADMIN — SODIUM CHLORIDE 1000 ML: 9 INJECTION, SOLUTION INTRAVENOUS at 19:44

## 2025-08-02 ENCOUNTER — APPOINTMENT (OUTPATIENT)
Dept: CT IMAGING | Facility: HOSPITAL | Age: 30
DRG: 073 | End: 2025-08-02
Payer: COMMERCIAL

## 2025-08-02 ENCOUNTER — APPOINTMENT (OUTPATIENT)
Dept: MRI IMAGING | Facility: HOSPITAL | Age: 30
DRG: 073 | End: 2025-08-02
Payer: COMMERCIAL

## 2025-08-02 ENCOUNTER — APPOINTMENT (OUTPATIENT)
Dept: GENERAL RADIOLOGY | Facility: HOSPITAL | Age: 30
DRG: 073 | End: 2025-08-02
Payer: COMMERCIAL

## 2025-08-02 ENCOUNTER — APPOINTMENT (OUTPATIENT)
Dept: ULTRASOUND IMAGING | Facility: HOSPITAL | Age: 30
DRG: 073 | End: 2025-08-02
Payer: COMMERCIAL

## 2025-08-02 PROBLEM — E87.6 HYPOKALEMIA: Status: ACTIVE | Noted: 2025-08-02

## 2025-08-02 PROBLEM — E83.42 HYPOMAGNESEMIA: Status: ACTIVE | Noted: 2025-08-02

## 2025-08-02 PROBLEM — N39.0 UTI (URINARY TRACT INFECTION): Status: ACTIVE | Noted: 2025-08-02

## 2025-08-02 PROBLEM — D64.9 SYMPTOMATIC ANEMIA: Status: ACTIVE | Noted: 2025-08-02

## 2025-08-02 LAB
AMPHET+METHAMPHET UR QL: NEGATIVE
AMPHETAMINES UR QL: NEGATIVE
ANION GAP SERPL CALCULATED.3IONS-SCNC: 12 MMOL/L (ref 5–15)
ANION GAP SERPL CALCULATED.3IONS-SCNC: 14.2 MMOL/L (ref 5–15)
ANION GAP SERPL CALCULATED.3IONS-SCNC: 16 MMOL/L (ref 5–15)
ANION GAP SERPL CALCULATED.3IONS-SCNC: 16.1 MMOL/L (ref 5–15)
APTT PPP: 41.2 SECONDS (ref 22–39)
B-HCG UR QL: NEGATIVE
BACTERIA UR QL AUTO: ABNORMAL /HPF
BARBITURATES UR QL SCN: NEGATIVE
BENZODIAZ UR QL SCN: NEGATIVE
BH BB BLOOD EXPIRATION DATE: NORMAL
BH BB BLOOD TYPE BARCODE: 5100
BH BB DISPENSE STATUS: NORMAL
BH BB PRODUCT CODE: NORMAL
BH BB UNIT NUMBER: NORMAL
BILIRUB UR QL STRIP: NEGATIVE
BUN SERPL-MCNC: 14.1 MG/DL (ref 6–20)
BUN SERPL-MCNC: 16.5 MG/DL (ref 6–20)
BUN SERPL-MCNC: 21.2 MG/DL (ref 6–20)
BUN SERPL-MCNC: 26.6 MG/DL (ref 6–20)
BUN/CREAT SERPL: 14.2 (ref 7–25)
BUN/CREAT SERPL: 16.2 (ref 7–25)
BUN/CREAT SERPL: 18.4 (ref 7–25)
BUN/CREAT SERPL: 18.7 (ref 7–25)
BUPRENORPHINE SERPL-MCNC: NEGATIVE NG/ML
CA-I SERPL ISE-MCNC: 0.86 MMOL/L (ref 1.15–1.3)
CALCIUM SPEC-SCNC: 6.9 MG/DL (ref 8.6–10.5)
CALCIUM SPEC-SCNC: 7.1 MG/DL (ref 8.6–10.5)
CALCIUM SPEC-SCNC: 7.1 MG/DL (ref 8.6–10.5)
CALCIUM SPEC-SCNC: 7.5 MG/DL (ref 8.6–10.5)
CANNABINOIDS SERPL QL: NEGATIVE
CHLORIDE SERPL-SCNC: 84 MMOL/L (ref 98–107)
CHLORIDE SERPL-SCNC: 89 MMOL/L (ref 98–107)
CHLORIDE SERPL-SCNC: 95 MMOL/L (ref 98–107)
CHLORIDE SERPL-SCNC: 97 MMOL/L (ref 98–107)
CLARITY UR: ABNORMAL
CO2 SERPL-SCNC: 24.8 MMOL/L (ref 22–29)
CO2 SERPL-SCNC: 28 MMOL/L (ref 22–29)
CO2 SERPL-SCNC: 28.9 MMOL/L (ref 22–29)
CO2 SERPL-SCNC: 30 MMOL/L (ref 22–29)
COCAINE UR QL: NEGATIVE
COLOR UR: ABNORMAL
CREAT SERPL-MCNC: 0.99 MG/DL (ref 0.57–1)
CREAT SERPL-MCNC: 1.02 MG/DL (ref 0.57–1)
CREAT SERPL-MCNC: 1.15 MG/DL (ref 0.57–1)
CREAT SERPL-MCNC: 1.42 MG/DL (ref 0.57–1)
CROSSMATCH INTERPRETATION: NORMAL
D DIMER PPP FEU-MCNC: 1.51 MCGFEU/ML (ref 0–0.5)
EGFRCR SERPLBLD CKD-EPI 2021: 50.5 ML/MIN/1.73
EGFRCR SERPLBLD CKD-EPI 2021: 65 ML/MIN/1.73
EGFRCR SERPLBLD CKD-EPI 2021: 75.1 ML/MIN/1.73
EGFRCR SERPLBLD CKD-EPI 2021: 77.9 ML/MIN/1.73
FENTANYL UR-MCNC: NEGATIVE NG/ML
FIBRINOGEN PPP-MCNC: 587 MG/DL (ref 203–567)
FOLATE SERPL-MCNC: 15 NG/ML (ref 4.78–24.2)
GLUCOSE BLDC GLUCOMTR-MCNC: 124 MG/DL (ref 70–130)
GLUCOSE BLDC GLUCOMTR-MCNC: 180 MG/DL (ref 70–130)
GLUCOSE SERPL-MCNC: 109 MG/DL (ref 65–99)
GLUCOSE SERPL-MCNC: 123 MG/DL (ref 65–99)
GLUCOSE SERPL-MCNC: 166 MG/DL (ref 65–99)
GLUCOSE SERPL-MCNC: 180 MG/DL (ref 65–99)
GLUCOSE UR STRIP-MCNC: NEGATIVE MG/DL
HCT VFR BLD AUTO: 18.3 % (ref 34–46.6)
HCT VFR BLD AUTO: 24.8 % (ref 34–46.6)
HCT VFR BLD AUTO: 26.6 % (ref 34–46.6)
HGB BLD-MCNC: 6.1 G/DL (ref 12–15.9)
HGB BLD-MCNC: 8.3 G/DL (ref 12–15.9)
HGB BLD-MCNC: 8.8 G/DL (ref 12–15.9)
HGB UR QL STRIP.AUTO: NEGATIVE
HYALINE CASTS UR QL AUTO: ABNORMAL /LPF
INR PPP: 1.25 (ref 0.89–1.12)
IRON 24H UR-MRATE: 29 MCG/DL (ref 37–145)
IRON SATN MFR SERPL: 18 % (ref 20–50)
KETONES UR QL STRIP: ABNORMAL
LEUKOCYTE ESTERASE UR QL STRIP.AUTO: ABNORMAL
MAGNESIUM SERPL-MCNC: 2 MG/DL (ref 1.6–2.6)
MAGNESIUM SERPL-MCNC: 3.2 MG/DL (ref 1.6–2.6)
METHADONE UR QL SCN: NEGATIVE
MRSA DNA SPEC QL NAA+PROBE: POSITIVE
NITRITE UR QL STRIP: NEGATIVE
OPIATES UR QL: NEGATIVE
OXYCODONE UR QL SCN: NEGATIVE
PCP UR QL SCN: NEGATIVE
PH UR STRIP.AUTO: <=5 [PH] (ref 5–8)
PHOSPHATE SERPL-MCNC: 0.8 MG/DL (ref 2.5–4.5)
PHOSPHATE SERPL-MCNC: 2.1 MG/DL (ref 2.5–4.5)
POTASSIUM SERPL-SCNC: 2.2 MMOL/L (ref 3.5–5.2)
POTASSIUM SERPL-SCNC: 2.6 MMOL/L (ref 3.5–5.2)
POTASSIUM SERPL-SCNC: 2.9 MMOL/L (ref 3.5–5.2)
POTASSIUM SERPL-SCNC: 3.9 MMOL/L (ref 3.5–5.2)
PROCALCITONIN SERPL-MCNC: 1 NG/ML (ref 0–0.25)
PROT UR QL STRIP: ABNORMAL
PROTHROMBIN TIME: 16.5 SECONDS (ref 12.2–15.3)
QT INTERVAL: 378 MS
QTC INTERVAL: 575 MS
RBC # UR STRIP: ABNORMAL /HPF
REF LAB TEST METHOD: ABNORMAL
SODIUM SERPL-SCNC: 129 MMOL/L (ref 136–145)
SODIUM SERPL-SCNC: 133 MMOL/L (ref 136–145)
SODIUM SERPL-SCNC: 136 MMOL/L (ref 136–145)
SODIUM SERPL-SCNC: 137 MMOL/L (ref 136–145)
SP GR UR STRIP: 1.02 (ref 1–1.03)
SQUAMOUS #/AREA URNS HPF: ABNORMAL /HPF
TIBC SERPL-MCNC: 158 MCG/DL (ref 298–536)
TRANSFERRIN SERPL-MCNC: 106 MG/DL (ref 200–360)
TRICYCLICS UR QL SCN: NEGATIVE
TRIGL SERPL-MCNC: 137 MG/DL (ref 0–150)
TSH SERPL DL<=0.05 MIU/L-ACNC: 2.92 UIU/ML (ref 0.27–4.2)
UNIT  ABO: NORMAL
UNIT  RH: NORMAL
UROBILINOGEN UR QL STRIP: ABNORMAL
VIT B12 BLD-MCNC: 1805 PG/ML (ref 211–946)
WBC # UR STRIP: ABNORMAL /HPF

## 2025-08-02 PROCEDURE — 71045 X-RAY EXAM CHEST 1 VIEW: CPT

## 2025-08-02 PROCEDURE — 99291 CRITICAL CARE FIRST HOUR: CPT | Performed by: STUDENT IN AN ORGANIZED HEALTH CARE EDUCATION/TRAINING PROGRAM

## 2025-08-02 PROCEDURE — 70544 MR ANGIOGRAPHY HEAD W/O DYE: CPT

## 2025-08-02 PROCEDURE — 85014 HEMATOCRIT: CPT

## 2025-08-02 PROCEDURE — 36430 TRANSFUSION BLD/BLD COMPNT: CPT

## 2025-08-02 PROCEDURE — 25810000003 SODIUM CHLORIDE 0.9 % SOLUTION: Performed by: STUDENT IN AN ORGANIZED HEALTH CARE EDUCATION/TRAINING PROGRAM

## 2025-08-02 PROCEDURE — 25010000002 DIAZEPAM PER 5 MG

## 2025-08-02 PROCEDURE — 25010000002 PHENOBARBITAL PER 120 MG

## 2025-08-02 PROCEDURE — 80048 BASIC METABOLIC PNL TOTAL CA: CPT | Performed by: STUDENT IN AN ORGANIZED HEALTH CARE EDUCATION/TRAINING PROGRAM

## 2025-08-02 PROCEDURE — 99222 1ST HOSP IP/OBS MODERATE 55: CPT | Performed by: PHYSICIAN ASSISTANT

## 2025-08-02 PROCEDURE — 76705 ECHO EXAM OF ABDOMEN: CPT

## 2025-08-02 PROCEDURE — 02HV33Z INSERTION OF INFUSION DEVICE INTO SUPERIOR VENA CAVA, PERCUTANEOUS APPROACH: ICD-10-PCS

## 2025-08-02 PROCEDURE — 85730 THROMBOPLASTIN TIME PARTIAL: CPT

## 2025-08-02 PROCEDURE — 87186 SC STD MICRODIL/AGAR DIL: CPT

## 2025-08-02 PROCEDURE — 83735 ASSAY OF MAGNESIUM: CPT | Performed by: STUDENT IN AN ORGANIZED HEALTH CARE EDUCATION/TRAINING PROGRAM

## 2025-08-02 PROCEDURE — 82948 REAGENT STRIP/BLOOD GLUCOSE: CPT

## 2025-08-02 PROCEDURE — 25010000002 POTASSIUM CHLORIDE 10 MEQ/100ML SOLUTION: Performed by: EMERGENCY MEDICINE

## 2025-08-02 PROCEDURE — 80307 DRUG TEST PRSMV CHEM ANLYZR: CPT

## 2025-08-02 PROCEDURE — 85610 PROTHROMBIN TIME: CPT

## 2025-08-02 PROCEDURE — 84466 ASSAY OF TRANSFERRIN: CPT

## 2025-08-02 PROCEDURE — 25010000002 POTASSIUM CHLORIDE 10 MEQ/100ML SOLUTION

## 2025-08-02 PROCEDURE — 25810000003 SODIUM CHLORIDE 0.9 % SOLUTION

## 2025-08-02 PROCEDURE — 70547 MR ANGIOGRAPHY NECK W/O DYE: CPT

## 2025-08-02 PROCEDURE — 85379 FIBRIN DEGRADATION QUANT: CPT

## 2025-08-02 PROCEDURE — 87088 URINE BACTERIA CULTURE: CPT

## 2025-08-02 PROCEDURE — 83735 ASSAY OF MAGNESIUM: CPT | Performed by: INTERNAL MEDICINE

## 2025-08-02 PROCEDURE — 80048 BASIC METABOLIC PNL TOTAL CA: CPT | Performed by: INTERNAL MEDICINE

## 2025-08-02 PROCEDURE — 87641 MR-STAPH DNA AMP PROBE: CPT | Performed by: STUDENT IN AN ORGANIZED HEALTH CARE EDUCATION/TRAINING PROGRAM

## 2025-08-02 PROCEDURE — P9016 RBC LEUKOCYTES REDUCED: HCPCS

## 2025-08-02 PROCEDURE — 82746 ASSAY OF FOLIC ACID SERUM: CPT

## 2025-08-02 PROCEDURE — 85018 HEMOGLOBIN: CPT

## 2025-08-02 PROCEDURE — 25010000002 FOLIC ACID 5 MG/ML SOLUTION 10 ML VIAL: Performed by: EMERGENCY MEDICINE

## 2025-08-02 PROCEDURE — 80048 BASIC METABOLIC PNL TOTAL CA: CPT

## 2025-08-02 PROCEDURE — 87040 BLOOD CULTURE FOR BACTERIA: CPT

## 2025-08-02 PROCEDURE — 82607 VITAMIN B-12: CPT

## 2025-08-02 PROCEDURE — 81001 URINALYSIS AUTO W/SCOPE: CPT | Performed by: PHYSICIAN ASSISTANT

## 2025-08-02 PROCEDURE — 83540 ASSAY OF IRON: CPT

## 2025-08-02 PROCEDURE — 25010000002 THIAMINE PER 100 MG: Performed by: EMERGENCY MEDICINE

## 2025-08-02 PROCEDURE — 70551 MRI BRAIN STEM W/O DYE: CPT

## 2025-08-02 PROCEDURE — 70450 CT HEAD/BRAIN W/O DYE: CPT

## 2025-08-02 PROCEDURE — 82330 ASSAY OF CALCIUM: CPT | Performed by: NURSE PRACTITIONER

## 2025-08-02 PROCEDURE — 84100 ASSAY OF PHOSPHORUS: CPT | Performed by: INTERNAL MEDICINE

## 2025-08-02 PROCEDURE — 87086 URINE CULTURE/COLONY COUNT: CPT

## 2025-08-02 PROCEDURE — C1894 INTRO/SHEATH, NON-LASER: HCPCS

## 2025-08-02 PROCEDURE — 85384 FIBRINOGEN ACTIVITY: CPT

## 2025-08-02 PROCEDURE — 25010000002 CEFTRIAXONE PER 250 MG

## 2025-08-02 PROCEDURE — 81025 URINE PREGNANCY TEST: CPT | Performed by: PHYSICIAN ASSISTANT

## 2025-08-02 PROCEDURE — C1751 CATH, INF, PER/CENT/MIDLINE: HCPCS

## 2025-08-02 PROCEDURE — 25010000002 DIAZEPAM PER 5 MG: Performed by: EMERGENCY MEDICINE

## 2025-08-02 PROCEDURE — 25010000002 OCTREOTIDE PER 25 MCG

## 2025-08-02 PROCEDURE — 86900 BLOOD TYPING SEROLOGIC ABO: CPT

## 2025-08-02 PROCEDURE — 25010000002 MAGNESIUM SULFATE 2 GM/50ML SOLUTION: Performed by: EMERGENCY MEDICINE

## 2025-08-02 RX ORDER — SODIUM CHLORIDE 0.9 % (FLUSH) 0.9 %
10 SYRINGE (ML) INJECTION EVERY 12 HOURS SCHEDULED
Status: DISCONTINUED | OUTPATIENT
Start: 2025-08-02 | End: 2025-08-11 | Stop reason: HOSPADM

## 2025-08-02 RX ORDER — POTASSIUM CHLORIDE 1500 MG/1
40 TABLET, EXTENDED RELEASE ORAL EVERY 4 HOURS
Status: COMPLETED | OUTPATIENT
Start: 2025-08-02 | End: 2025-08-03

## 2025-08-02 RX ORDER — PHENOBARBITAL 32.4 MG/1
32.4 TABLET ORAL ONCE
Status: COMPLETED | OUTPATIENT
Start: 2025-08-04 | End: 2025-08-05

## 2025-08-02 RX ORDER — POTASSIUM CHLORIDE 7.45 MG/ML
10 INJECTION INTRAVENOUS
Status: COMPLETED | OUTPATIENT
Start: 2025-08-02 | End: 2025-08-02

## 2025-08-02 RX ORDER — PHENOBARBITAL SODIUM 65 MG/ML
65 INJECTION, SOLUTION INTRAMUSCULAR; INTRAVENOUS ONCE
Status: COMPLETED | OUTPATIENT
Start: 2025-08-02 | End: 2025-08-02

## 2025-08-02 RX ORDER — PHENOBARBITAL SODIUM 65 MG/ML
3 INJECTION, SOLUTION INTRAMUSCULAR; INTRAVENOUS ONCE
Status: COMPLETED | OUTPATIENT
Start: 2025-08-02 | End: 2025-08-02

## 2025-08-02 RX ORDER — PHENOBARBITAL SODIUM 65 MG/ML
65 INJECTION, SOLUTION INTRAMUSCULAR; INTRAVENOUS ONCE
Status: COMPLETED | OUTPATIENT
Start: 2025-08-03 | End: 2025-08-03

## 2025-08-02 RX ORDER — PHENOBARBITAL 32.4 MG/1
32.4 TABLET ORAL ONCE
Status: COMPLETED | OUTPATIENT
Start: 2025-08-04 | End: 2025-08-04

## 2025-08-02 RX ORDER — FENTANYL/ROPIVACAINE/NS/PF 2-625MCG/1
15 PLASTIC BAG, INJECTION (ML) EPIDURAL ONCE
Status: COMPLETED | OUTPATIENT
Start: 2025-08-02 | End: 2025-08-02

## 2025-08-02 RX ORDER — POTASSIUM CHLORIDE 750 MG/1
40 CAPSULE, EXTENDED RELEASE ORAL EVERY 4 HOURS
Status: COMPLETED | OUTPATIENT
Start: 2025-08-02 | End: 2025-08-02

## 2025-08-02 RX ORDER — SODIUM CHLORIDE 0.9 % (FLUSH) 0.9 %
10 SYRINGE (ML) INJECTION AS NEEDED
Status: DISCONTINUED | OUTPATIENT
Start: 2025-08-02 | End: 2025-08-02

## 2025-08-02 RX ORDER — DIAZEPAM 10 MG/2ML
10 INJECTION, SOLUTION INTRAMUSCULAR; INTRAVENOUS
Status: DISCONTINUED | OUTPATIENT
Start: 2025-08-02 | End: 2025-08-07

## 2025-08-02 RX ORDER — PANTOPRAZOLE SODIUM 40 MG/10ML
40 INJECTION, POWDER, LYOPHILIZED, FOR SOLUTION INTRAVENOUS
Status: DISCONTINUED | OUTPATIENT
Start: 2025-08-02 | End: 2025-08-11 | Stop reason: HOSPADM

## 2025-08-02 RX ORDER — SODIUM CHLORIDE 0.9 % (FLUSH) 0.9 %
10 SYRINGE (ML) INJECTION AS NEEDED
Status: DISCONTINUED | OUTPATIENT
Start: 2025-08-02 | End: 2025-08-11 | Stop reason: HOSPADM

## 2025-08-02 RX ORDER — DIAZEPAM 10 MG/2ML
15 INJECTION, SOLUTION INTRAMUSCULAR; INTRAVENOUS
Status: DISCONTINUED | OUTPATIENT
Start: 2025-08-02 | End: 2025-08-07

## 2025-08-02 RX ORDER — PROPRANOLOL HCL 20 MG
10 TABLET ORAL EVERY 8 HOURS SCHEDULED
Status: DISCONTINUED | OUTPATIENT
Start: 2025-08-02 | End: 2025-08-04

## 2025-08-02 RX ORDER — SODIUM CHLORIDE 0.9 % (FLUSH) 0.9 %
20 SYRINGE (ML) INJECTION AS NEEDED
Status: DISCONTINUED | OUTPATIENT
Start: 2025-08-02 | End: 2025-08-11 | Stop reason: HOSPADM

## 2025-08-02 RX ORDER — DOXEPIN HYDROCHLORIDE 10 MG/1
10 CAPSULE ORAL NIGHTLY
Status: DISCONTINUED | OUTPATIENT
Start: 2025-08-03 | End: 2025-08-11 | Stop reason: HOSPADM

## 2025-08-02 RX ORDER — DIAZEPAM 5 MG/1
10 TABLET ORAL
Status: DISCONTINUED | OUTPATIENT
Start: 2025-08-02 | End: 2025-08-07

## 2025-08-02 RX ORDER — PHENOBARBITAL 32.4 MG/1
32.4 TABLET ORAL ONCE
Status: COMPLETED | OUTPATIENT
Start: 2025-08-03 | End: 2025-08-03

## 2025-08-02 RX ORDER — SODIUM CHLORIDE 9 MG/ML
40 INJECTION, SOLUTION INTRAVENOUS AS NEEDED
Status: DISCONTINUED | OUTPATIENT
Start: 2025-08-02 | End: 2025-08-11 | Stop reason: HOSPADM

## 2025-08-02 RX ORDER — MAGNESIUM SULFATE HEPTAHYDRATE 40 MG/ML
2 INJECTION, SOLUTION INTRAVENOUS
Status: COMPLETED | OUTPATIENT
Start: 2025-08-02 | End: 2025-08-02

## 2025-08-02 RX ORDER — BUSPIRONE HYDROCHLORIDE 10 MG/1
10 TABLET ORAL 3 TIMES DAILY
Status: DISCONTINUED | OUTPATIENT
Start: 2025-08-02 | End: 2025-08-11 | Stop reason: HOSPADM

## 2025-08-02 RX ORDER — NITROGLYCERIN 0.4 MG/1
0.4 TABLET SUBLINGUAL
Status: DISCONTINUED | OUTPATIENT
Start: 2025-08-02 | End: 2025-08-11 | Stop reason: HOSPADM

## 2025-08-02 RX ORDER — ACETAMINOPHEN 325 MG/1
650 TABLET ORAL EVERY 8 HOURS PRN
Status: DISCONTINUED | OUTPATIENT
Start: 2025-08-02 | End: 2025-08-11 | Stop reason: HOSPADM

## 2025-08-02 RX ORDER — SODIUM CHLORIDE 0.9 % (FLUSH) 0.9 %
10 SYRINGE (ML) INJECTION EVERY 12 HOURS SCHEDULED
Status: DISCONTINUED | OUTPATIENT
Start: 2025-08-02 | End: 2025-08-02

## 2025-08-02 RX ORDER — DIAZEPAM 5 MG/1
15 TABLET ORAL
Status: DISCONTINUED | OUTPATIENT
Start: 2025-08-02 | End: 2025-08-07

## 2025-08-02 RX ORDER — DIAZEPAM 10 MG/2ML
20 INJECTION, SOLUTION INTRAMUSCULAR; INTRAVENOUS
Status: DISCONTINUED | OUTPATIENT
Start: 2025-08-02 | End: 2025-08-07

## 2025-08-02 RX ORDER — LOSARTAN POTASSIUM 25 MG/1
25 TABLET ORAL
Status: DISCONTINUED | OUTPATIENT
Start: 2025-08-02 | End: 2025-08-11 | Stop reason: HOSPADM

## 2025-08-02 RX ORDER — PANTOPRAZOLE SODIUM 40 MG/10ML
80 INJECTION, POWDER, LYOPHILIZED, FOR SOLUTION INTRAVENOUS ONCE
Status: COMPLETED | OUTPATIENT
Start: 2025-08-02 | End: 2025-08-02

## 2025-08-02 RX ORDER — SODIUM CHLORIDE 9 MG/ML
125 INJECTION, SOLUTION INTRAVENOUS CONTINUOUS
Status: ACTIVE | OUTPATIENT
Start: 2025-08-02 | End: 2025-08-02

## 2025-08-02 RX ORDER — SODIUM CHLORIDE 9 MG/ML
40 INJECTION, SOLUTION INTRAVENOUS AS NEEDED
Status: DISCONTINUED | OUTPATIENT
Start: 2025-08-02 | End: 2025-08-02

## 2025-08-02 RX ORDER — PROPRANOLOL HCL 20 MG
10 TABLET ORAL 3 TIMES DAILY
COMMUNITY
Start: 2025-05-28

## 2025-08-02 RX ORDER — DIAZEPAM 5 MG/1
20 TABLET ORAL
Status: DISCONTINUED | OUTPATIENT
Start: 2025-08-02 | End: 2025-08-07

## 2025-08-02 RX ADMIN — PHENOBARBITAL SODIUM 162.5 MG: 65 INJECTION INTRAMUSCULAR at 08:23

## 2025-08-02 RX ADMIN — POTASSIUM CHLORIDE 40 MEQ: 750 CAPSULE, EXTENDED RELEASE ORAL at 08:23

## 2025-08-02 RX ADMIN — PANTOPRAZOLE SODIUM 40 MG: 40 INJECTION, POWDER, FOR SOLUTION INTRAVENOUS at 08:24

## 2025-08-02 RX ADMIN — POTASSIUM CHLORIDE 10 MEQ: 7.46 INJECTION, SOLUTION INTRAVENOUS at 08:22

## 2025-08-02 RX ADMIN — OCTREOTIDE ACETATE 50 MCG/HR: 500 INJECTION, SOLUTION INTRAVENOUS; SUBCUTANEOUS at 05:06

## 2025-08-02 RX ADMIN — Medication 10 ML: at 21:17

## 2025-08-02 RX ADMIN — POTASSIUM CHLORIDE 10 MEQ: 7.46 INJECTION, SOLUTION INTRAVENOUS at 01:38

## 2025-08-02 RX ADMIN — MAGNESIUM SULFATE IN WATER FOR 2 G: 40 INJECTION INTRAVENOUS at 05:07

## 2025-08-02 RX ADMIN — PANTOPRAZOLE SODIUM 80 MG: 40 INJECTION, POWDER, FOR SOLUTION INTRAVENOUS at 05:06

## 2025-08-02 RX ADMIN — BUSPIRONE HYDROCHLORIDE 10 MG: 10 TABLET ORAL at 21:14

## 2025-08-02 RX ADMIN — POTASSIUM CHLORIDE 40 MEQ: 750 CAPSULE, EXTENDED RELEASE ORAL at 10:40

## 2025-08-02 RX ADMIN — DIAZEPAM 5 MG: 5 INJECTION, SOLUTION INTRAMUSCULAR; INTRAVENOUS at 01:39

## 2025-08-02 RX ADMIN — SODIUM CHLORIDE 15 MMOL: 9 INJECTION, SOLUTION INTRAVENOUS at 06:12

## 2025-08-02 RX ADMIN — THIAMINE HYDROCHLORIDE 200 MG: 100 INJECTION, SOLUTION INTRAMUSCULAR; INTRAVENOUS at 14:47

## 2025-08-02 RX ADMIN — POTASSIUM CHLORIDE 10 MEQ: 7.46 INJECTION, SOLUTION INTRAVENOUS at 10:29

## 2025-08-02 RX ADMIN — PHENOBARBITAL SODIUM 162.5 MG: 65 INJECTION INTRAMUSCULAR at 10:44

## 2025-08-02 RX ADMIN — SODIUM CHLORIDE 15 MMOL: 9 INJECTION, SOLUTION INTRAVENOUS at 23:23

## 2025-08-02 RX ADMIN — POTASSIUM CHLORIDE 10 MEQ: 7.46 INJECTION, SOLUTION INTRAVENOUS at 03:05

## 2025-08-02 RX ADMIN — MUPIROCIN 1 APPLICATION: 20 OINTMENT TOPICAL at 21:15

## 2025-08-02 RX ADMIN — THIAMINE HYDROCHLORIDE 200 MG: 100 INJECTION, SOLUTION INTRAMUSCULAR; INTRAVENOUS at 21:13

## 2025-08-02 RX ADMIN — ACETAMINOPHEN 650 MG: 325 TABLET ORAL at 06:12

## 2025-08-02 RX ADMIN — PHENOBARBITAL SODIUM 65 MG: 65 INJECTION INTRAMUSCULAR at 21:16

## 2025-08-02 RX ADMIN — SODIUM CHLORIDE 2000 MG: 900 INJECTION INTRAVENOUS at 03:04

## 2025-08-02 RX ADMIN — BUSPIRONE HYDROCHLORIDE 10 MG: 10 TABLET ORAL at 17:07

## 2025-08-02 RX ADMIN — SODIUM CHLORIDE 2000 MG: 900 INJECTION INTRAVENOUS at 21:17

## 2025-08-02 RX ADMIN — Medication 10 ML: at 08:24

## 2025-08-02 RX ADMIN — DIAZEPAM 10 MG: 10 INJECTION, SOLUTION INTRAMUSCULAR; INTRAVENOUS at 14:46

## 2025-08-02 RX ADMIN — DIAZEPAM 20 MG: 5 INJECTION, SOLUTION INTRAMUSCULAR; INTRAVENOUS at 04:24

## 2025-08-02 RX ADMIN — DIAZEPAM 10 MG: 10 INJECTION, SOLUTION INTRAMUSCULAR; INTRAVENOUS at 19:41

## 2025-08-02 RX ADMIN — ACETAMINOPHEN 650 MG: 325 TABLET ORAL at 21:13

## 2025-08-02 RX ADMIN — SODIUM CHLORIDE 125 ML/HR: 9 INJECTION, SOLUTION INTRAVENOUS at 04:00

## 2025-08-02 RX ADMIN — THIAMINE HYDROCHLORIDE 200 MG: 100 INJECTION, SOLUTION INTRAMUSCULAR; INTRAVENOUS at 08:23

## 2025-08-02 RX ADMIN — POTASSIUM CHLORIDE 40 MEQ: 1500 TABLET, EXTENDED RELEASE ORAL at 21:13

## 2025-08-02 RX ADMIN — DIAZEPAM 10 MG: 10 INJECTION, SOLUTION INTRAMUSCULAR; INTRAVENOUS at 17:15

## 2025-08-02 RX ADMIN — SODIUM CHLORIDE 1 MG: 9 INJECTION, SOLUTION INTRAVENOUS at 08:24

## 2025-08-02 RX ADMIN — POTASSIUM CHLORIDE 40 MEQ: 1500 TABLET, EXTENDED RELEASE ORAL at 17:07

## 2025-08-02 RX ADMIN — MAGNESIUM SULFATE IN WATER FOR 2 G: 40 INJECTION INTRAVENOUS at 01:38

## 2025-08-02 RX ADMIN — SODIUM CHLORIDE 211.25 MG: 9 INJECTION, SOLUTION INTRAVENOUS at 04:34

## 2025-08-02 RX ADMIN — PANTOPRAZOLE SODIUM 40 MG: 40 INJECTION, POWDER, FOR SOLUTION INTRAVENOUS at 17:07

## 2025-08-02 RX ADMIN — DIAZEPAM 15 MG: 5 INJECTION, SOLUTION INTRAMUSCULAR; INTRAVENOUS at 22:46

## 2025-08-02 RX ADMIN — MAGNESIUM SULFATE IN WATER FOR 2 G: 40 INJECTION INTRAVENOUS at 03:06

## 2025-08-02 RX ADMIN — BUSPIRONE HYDROCHLORIDE 10 MG: 10 TABLET ORAL at 08:23

## 2025-08-02 RX ADMIN — DIAZEPAM 10 MG: 10 TABLET ORAL at 10:40

## 2025-08-03 LAB
ALBUMIN SERPL-MCNC: 2.8 G/DL (ref 3.5–5.2)
ALP SERPL-CCNC: 159 U/L (ref 39–117)
ALT SERPL W P-5'-P-CCNC: 35 U/L (ref 1–33)
ANION GAP SERPL CALCULATED.3IONS-SCNC: 12 MMOL/L (ref 5–15)
AST SERPL-CCNC: 113 U/L (ref 1–32)
BH BB BLOOD EXPIRATION DATE: NORMAL
BH BB BLOOD EXPIRATION DATE: NORMAL
BH BB BLOOD TYPE BARCODE: 5100
BH BB BLOOD TYPE BARCODE: 5100
BH BB DISPENSE STATUS: NORMAL
BH BB DISPENSE STATUS: NORMAL
BH BB PRODUCT CODE: NORMAL
BH BB PRODUCT CODE: NORMAL
BH BB UNIT NUMBER: NORMAL
BH BB UNIT NUMBER: NORMAL
BILIRUB CONJ SERPL-MCNC: 0.8 MG/DL (ref 0–0.3)
BILIRUB INDIRECT SERPL-MCNC: 0.3 MG/DL
BILIRUB SERPL-MCNC: 1.1 MG/DL (ref 0–1.2)
BUN SERPL-MCNC: 11.8 MG/DL (ref 6–20)
BUN/CREAT SERPL: 14.2 (ref 7–25)
CALCIUM SPEC-SCNC: 6.9 MG/DL (ref 8.6–10.5)
CHLORIDE SERPL-SCNC: 101 MMOL/L (ref 98–107)
CO2 SERPL-SCNC: 26 MMOL/L (ref 22–29)
CREAT SERPL-MCNC: 0.83 MG/DL (ref 0.57–1)
CROSSMATCH INTERPRETATION: NORMAL
CROSSMATCH INTERPRETATION: NORMAL
DEPRECATED RDW RBC AUTO: 66.7 FL (ref 37–54)
EGFRCR SERPLBLD CKD-EPI 2021: 96.2 ML/MIN/1.73
ERYTHROCYTE [DISTWIDTH] IN BLOOD BY AUTOMATED COUNT: 19.4 % (ref 12.3–15.4)
GLUCOSE SERPL-MCNC: 135 MG/DL (ref 65–99)
HCT VFR BLD AUTO: 23.3 % (ref 34–46.6)
HCT VFR BLD AUTO: 23.3 % (ref 34–46.6)
HCT VFR BLD AUTO: 24.4 % (ref 34–46.6)
HCT VFR BLD AUTO: 25.3 % (ref 34–46.6)
HGB BLD-MCNC: 7.7 G/DL (ref 12–15.9)
HGB BLD-MCNC: 7.7 G/DL (ref 12–15.9)
HGB BLD-MCNC: 8.2 G/DL (ref 12–15.9)
HGB BLD-MCNC: 8.4 G/DL (ref 12–15.9)
LIPASE SERPL-CCNC: 634 U/L (ref 13–60)
MAGNESIUM SERPL-MCNC: 1.5 MG/DL (ref 1.6–2.6)
MCH RBC QN AUTO: 31.3 PG (ref 26.6–33)
MCHC RBC AUTO-ENTMCNC: 33 G/DL (ref 31.5–35.7)
MCV RBC AUTO: 94.7 FL (ref 79–97)
PHOSPHATE SERPL-MCNC: 2.5 MG/DL (ref 2.5–4.5)
PLATELET # BLD AUTO: 124 10*3/MM3 (ref 140–450)
PMV BLD AUTO: 11.8 FL (ref 6–12)
POTASSIUM SERPL-SCNC: 4.8 MMOL/L (ref 3.5–5.2)
PROT SERPL-MCNC: 5.4 G/DL (ref 6–8.5)
RBC # BLD AUTO: 2.46 10*6/MM3 (ref 3.77–5.28)
SODIUM SERPL-SCNC: 139 MMOL/L (ref 136–145)
UNIT  ABO: NORMAL
UNIT  ABO: NORMAL
UNIT  RH: NORMAL
UNIT  RH: NORMAL
WBC NRBC COR # BLD AUTO: 5.56 10*3/MM3 (ref 3.4–10.8)

## 2025-08-03 PROCEDURE — 85014 HEMATOCRIT: CPT

## 2025-08-03 PROCEDURE — 25010000002 KETOROLAC TROMETHAMINE PER 15 MG: Performed by: NURSE PRACTITIONER

## 2025-08-03 PROCEDURE — 83735 ASSAY OF MAGNESIUM: CPT | Performed by: INTERNAL MEDICINE

## 2025-08-03 PROCEDURE — 25010000002 PHENOBARBITAL PER 120 MG

## 2025-08-03 PROCEDURE — 85018 HEMOGLOBIN: CPT

## 2025-08-03 PROCEDURE — 83690 ASSAY OF LIPASE: CPT | Performed by: INTERNAL MEDICINE

## 2025-08-03 PROCEDURE — 25010000002 MAGNESIUM SULFATE 2 GM/50ML SOLUTION: Performed by: INTERNAL MEDICINE

## 2025-08-03 PROCEDURE — 84100 ASSAY OF PHOSPHORUS: CPT | Performed by: INTERNAL MEDICINE

## 2025-08-03 PROCEDURE — 25010000002 DIAZEPAM PER 5 MG

## 2025-08-03 PROCEDURE — 25010000002 CEFTRIAXONE PER 250 MG

## 2025-08-03 PROCEDURE — 97162 PT EVAL MOD COMPLEX 30 MIN: CPT

## 2025-08-03 PROCEDURE — 80048 BASIC METABOLIC PNL TOTAL CA: CPT | Performed by: INTERNAL MEDICINE

## 2025-08-03 PROCEDURE — 85027 COMPLETE CBC AUTOMATED: CPT | Performed by: INTERNAL MEDICINE

## 2025-08-03 PROCEDURE — 99291 CRITICAL CARE FIRST HOUR: CPT | Performed by: INTERNAL MEDICINE

## 2025-08-03 PROCEDURE — 80076 HEPATIC FUNCTION PANEL: CPT | Performed by: INTERNAL MEDICINE

## 2025-08-03 PROCEDURE — 97166 OT EVAL MOD COMPLEX 45 MIN: CPT

## 2025-08-03 PROCEDURE — 25010000002 THIAMINE HCL 200 MG/2ML SOLUTION: Performed by: EMERGENCY MEDICINE

## 2025-08-03 PROCEDURE — 25010000002 THIAMINE PER 100 MG: Performed by: EMERGENCY MEDICINE

## 2025-08-03 PROCEDURE — 25010000002 FOLIC ACID 5 MG/ML SOLUTION 10 ML VIAL: Performed by: EMERGENCY MEDICINE

## 2025-08-03 RX ORDER — GABAPENTIN 100 MG/1
200 CAPSULE ORAL EVERY 8 HOURS SCHEDULED
Status: DISCONTINUED | OUTPATIENT
Start: 2025-08-03 | End: 2025-08-05

## 2025-08-03 RX ORDER — GABAPENTIN 100 MG/1
200 CAPSULE ORAL ONCE
Status: COMPLETED | OUTPATIENT
Start: 2025-08-03 | End: 2025-08-03

## 2025-08-03 RX ORDER — KETOROLAC TROMETHAMINE 15 MG/ML
15 INJECTION, SOLUTION INTRAMUSCULAR; INTRAVENOUS ONCE
Status: COMPLETED | OUTPATIENT
Start: 2025-08-03 | End: 2025-08-03

## 2025-08-03 RX ORDER — MAGNESIUM SULFATE HEPTAHYDRATE 40 MG/ML
2 INJECTION, SOLUTION INTRAVENOUS
Status: COMPLETED | OUTPATIENT
Start: 2025-08-03 | End: 2025-08-03

## 2025-08-03 RX ADMIN — MUPIROCIN 1 APPLICATION: 20 OINTMENT TOPICAL at 20:58

## 2025-08-03 RX ADMIN — DIAZEPAM 20 MG: 10 TABLET ORAL at 18:51

## 2025-08-03 RX ADMIN — MAGNESIUM SULFATE HEPTAHYDRATE 2 G: 40 INJECTION, SOLUTION INTRAVENOUS at 08:25

## 2025-08-03 RX ADMIN — GABAPENTIN 200 MG: 100 CAPSULE ORAL at 15:57

## 2025-08-03 RX ADMIN — SODIUM CHLORIDE 2000 MG: 900 INJECTION INTRAVENOUS at 20:58

## 2025-08-03 RX ADMIN — DIAZEPAM 15 MG: 5 INJECTION, SOLUTION INTRAMUSCULAR; INTRAVENOUS at 01:16

## 2025-08-03 RX ADMIN — THIAMINE HYDROCHLORIDE 200 MG: 100 INJECTION, SOLUTION INTRAMUSCULAR; INTRAVENOUS at 13:25

## 2025-08-03 RX ADMIN — KETOROLAC TROMETHAMINE 15 MG: 15 INJECTION, SOLUTION INTRAMUSCULAR; INTRAVENOUS at 13:25

## 2025-08-03 RX ADMIN — MAGNESIUM SULFATE HEPTAHYDRATE 2 G: 40 INJECTION, SOLUTION INTRAVENOUS at 13:26

## 2025-08-03 RX ADMIN — Medication 10 ML: at 20:58

## 2025-08-03 RX ADMIN — MAGNESIUM SULFATE HEPTAHYDRATE 2 G: 40 INJECTION, SOLUTION INTRAVENOUS at 12:13

## 2025-08-03 RX ADMIN — PHENOBARBITAL SODIUM 65 MG: 65 INJECTION INTRAMUSCULAR at 08:33

## 2025-08-03 RX ADMIN — PHENOBARBITAL 32.4 MG: 32.4 TABLET ORAL at 20:58

## 2025-08-03 RX ADMIN — GABAPENTIN 200 MG: 100 CAPSULE ORAL at 21:02

## 2025-08-03 RX ADMIN — Medication 10 ML: at 08:27

## 2025-08-03 RX ADMIN — DIAZEPAM 15 MG: 5 INJECTION, SOLUTION INTRAMUSCULAR; INTRAVENOUS at 13:34

## 2025-08-03 RX ADMIN — DIAZEPAM 15 MG: 5 INJECTION, SOLUTION INTRAMUSCULAR; INTRAVENOUS at 08:42

## 2025-08-03 RX ADMIN — DIAZEPAM 10 MG: 10 INJECTION, SOLUTION INTRAMUSCULAR; INTRAVENOUS at 06:18

## 2025-08-03 RX ADMIN — THIAMINE HYDROCHLORIDE 200 MG: 100 INJECTION, SOLUTION INTRAMUSCULAR; INTRAVENOUS at 21:01

## 2025-08-03 RX ADMIN — KETOROLAC TROMETHAMINE 15 MG: 15 INJECTION INTRAMUSCULAR at 00:59

## 2025-08-03 RX ADMIN — MUPIROCIN 1 APPLICATION: 20 OINTMENT TOPICAL at 08:27

## 2025-08-03 RX ADMIN — ACETAMINOPHEN 650 MG: 325 TABLET ORAL at 05:39

## 2025-08-03 RX ADMIN — DIAZEPAM 15 MG: 5 INJECTION, SOLUTION INTRAMUSCULAR; INTRAVENOUS at 15:57

## 2025-08-03 RX ADMIN — BUSPIRONE HYDROCHLORIDE 10 MG: 10 TABLET ORAL at 08:26

## 2025-08-03 RX ADMIN — DOXEPIN HYDROCHLORIDE 10 MG: 10 CAPSULE ORAL at 20:58

## 2025-08-03 RX ADMIN — DIAZEPAM 20 MG: 5 INJECTION, SOLUTION INTRAMUSCULAR; INTRAVENOUS at 12:30

## 2025-08-03 RX ADMIN — PANTOPRAZOLE SODIUM 40 MG: 40 INJECTION, POWDER, FOR SOLUTION INTRAVENOUS at 17:36

## 2025-08-03 RX ADMIN — BUSPIRONE HYDROCHLORIDE 10 MG: 10 TABLET ORAL at 20:58

## 2025-08-03 RX ADMIN — DIAZEPAM 10 MG: 10 TABLET ORAL at 23:43

## 2025-08-03 RX ADMIN — SODIUM CHLORIDE 15 MMOL: 9 INJECTION, SOLUTION INTRAVENOUS at 02:32

## 2025-08-03 RX ADMIN — SODIUM CHLORIDE 1 MG: 9 INJECTION, SOLUTION INTRAVENOUS at 08:25

## 2025-08-03 RX ADMIN — BUSPIRONE HYDROCHLORIDE 10 MG: 10 TABLET ORAL at 15:57

## 2025-08-03 RX ADMIN — THIAMINE HYDROCHLORIDE 200 MG: 100 INJECTION, SOLUTION INTRAMUSCULAR; INTRAVENOUS at 06:20

## 2025-08-03 RX ADMIN — POTASSIUM CHLORIDE 40 MEQ: 1500 TABLET, EXTENDED RELEASE ORAL at 00:35

## 2025-08-03 RX ADMIN — DIAZEPAM 15 MG: 5 INJECTION, SOLUTION INTRAMUSCULAR; INTRAVENOUS at 04:17

## 2025-08-03 RX ADMIN — PANTOPRAZOLE SODIUM 40 MG: 40 INJECTION, POWDER, FOR SOLUTION INTRAVENOUS at 06:30

## 2025-08-03 RX ADMIN — SODIUM CHLORIDE 15 MMOL: 9 INJECTION, SOLUTION INTRAVENOUS at 00:35

## 2025-08-04 ENCOUNTER — APPOINTMENT (OUTPATIENT)
Dept: MRI IMAGING | Facility: HOSPITAL | Age: 30
DRG: 073 | End: 2025-08-04
Payer: COMMERCIAL

## 2025-08-04 LAB
ALBUMIN SERPL-MCNC: 3 G/DL (ref 3.5–5.2)
ALBUMIN/GLOB SERPL: 1 G/DL
ALP SERPL-CCNC: 182 U/L (ref 39–117)
ALT SERPL W P-5'-P-CCNC: 42 U/L (ref 1–33)
ANION GAP SERPL CALCULATED.3IONS-SCNC: 11 MMOL/L (ref 5–15)
AST SERPL-CCNC: 114 U/L (ref 1–32)
BACTERIA SPEC AEROBE CULT: ABNORMAL
BILIRUB SERPL-MCNC: 1.1 MG/DL (ref 0–1.2)
BUN SERPL-MCNC: 10.8 MG/DL (ref 6–20)
BUN/CREAT SERPL: 16.1 (ref 7–25)
CALCIUM SPEC-SCNC: 7.5 MG/DL (ref 8.6–10.5)
CHLORIDE SERPL-SCNC: 99 MMOL/L (ref 98–107)
CO2 SERPL-SCNC: 27 MMOL/L (ref 22–29)
CREAT SERPL-MCNC: 0.67 MG/DL (ref 0.57–1)
DEPRECATED RDW RBC AUTO: 65.3 FL (ref 37–54)
EGFRCR SERPLBLD CKD-EPI 2021: 119.3 ML/MIN/1.73
ERYTHROCYTE [DISTWIDTH] IN BLOOD BY AUTOMATED COUNT: 19 % (ref 12.3–15.4)
FERRITIN SERPL-MCNC: 1688 NG/ML (ref 13–150)
GLOBULIN UR ELPH-MCNC: 2.9 GM/DL
GLUCOSE SERPL-MCNC: 106 MG/DL (ref 65–99)
HCT VFR BLD AUTO: 24.8 % (ref 34–46.6)
HCT VFR BLD AUTO: 24.8 % (ref 34–46.6)
HCT VFR BLD AUTO: 25.8 % (ref 34–46.6)
HGB BLD-MCNC: 7.9 G/DL (ref 12–15.9)
HGB BLD-MCNC: 8.2 G/DL (ref 12–15.9)
HGB BLD-MCNC: 8.5 G/DL (ref 12–15.9)
LDH SERPL-CCNC: 264 U/L (ref 135–214)
MAGNESIUM SERPL-MCNC: 1.9 MG/DL (ref 1.6–2.6)
MCH RBC QN AUTO: 31.3 PG (ref 26.6–33)
MCHC RBC AUTO-ENTMCNC: 33.1 G/DL (ref 31.5–35.7)
MCV RBC AUTO: 94.7 FL (ref 79–97)
PHOSPHATE SERPL-MCNC: 2.3 MG/DL (ref 2.5–4.5)
PLATELET # BLD AUTO: 156 10*3/MM3 (ref 140–450)
PMV BLD AUTO: 11.4 FL (ref 6–12)
POTASSIUM SERPL-SCNC: 4.7 MMOL/L (ref 3.5–5.2)
PROT SERPL-MCNC: 5.9 G/DL (ref 6–8.5)
RBC # BLD AUTO: 2.62 10*6/MM3 (ref 3.77–5.28)
RETICS # AUTO: 0.04 10*6/MM3 (ref 0.02–0.13)
RETICS/RBC NFR AUTO: 1.77 % (ref 0.7–1.9)
SODIUM SERPL-SCNC: 137 MMOL/L (ref 136–145)
WBC NRBC COR # BLD AUTO: 5.27 10*3/MM3 (ref 3.4–10.8)

## 2025-08-04 PROCEDURE — 25010000002 THIAMINE PER 100 MG: Performed by: EMERGENCY MEDICINE

## 2025-08-04 PROCEDURE — 85014 HEMATOCRIT: CPT

## 2025-08-04 PROCEDURE — 85027 COMPLETE CBC AUTOMATED: CPT | Performed by: INTERNAL MEDICINE

## 2025-08-04 PROCEDURE — 83735 ASSAY OF MAGNESIUM: CPT | Performed by: INTERNAL MEDICINE

## 2025-08-04 PROCEDURE — 25010000002 HEPARIN (PORCINE) PER 1000 UNITS: Performed by: STUDENT IN AN ORGANIZED HEALTH CARE EDUCATION/TRAINING PROGRAM

## 2025-08-04 PROCEDURE — 99291 CRITICAL CARE FIRST HOUR: CPT | Performed by: STUDENT IN AN ORGANIZED HEALTH CARE EDUCATION/TRAINING PROGRAM

## 2025-08-04 PROCEDURE — 25810000003 LACTATED RINGERS SOLUTION: Performed by: STUDENT IN AN ORGANIZED HEALTH CARE EDUCATION/TRAINING PROGRAM

## 2025-08-04 PROCEDURE — 99222 1ST HOSP IP/OBS MODERATE 55: CPT | Performed by: INTERNAL MEDICINE

## 2025-08-04 PROCEDURE — 25010000002 CEFTRIAXONE PER 250 MG

## 2025-08-04 PROCEDURE — 82728 ASSAY OF FERRITIN: CPT | Performed by: INTERNAL MEDICINE

## 2025-08-04 PROCEDURE — 72148 MRI LUMBAR SPINE W/O DYE: CPT

## 2025-08-04 PROCEDURE — 80053 COMPREHEN METABOLIC PANEL: CPT | Performed by: INTERNAL MEDICINE

## 2025-08-04 PROCEDURE — 25010000002 FOLIC ACID 5 MG/ML SOLUTION 10 ML VIAL: Performed by: EMERGENCY MEDICINE

## 2025-08-04 PROCEDURE — 25010000002 METOPROLOL TARTRATE 5 MG/5ML SOLUTION: Performed by: NURSE PRACTITIONER

## 2025-08-04 PROCEDURE — 25010000002 THIAMINE HCL 200 MG/2ML SOLUTION: Performed by: EMERGENCY MEDICINE

## 2025-08-04 PROCEDURE — 83615 LACTATE (LD) (LDH) ENZYME: CPT | Performed by: INTERNAL MEDICINE

## 2025-08-04 PROCEDURE — 25810000003 SODIUM CHLORIDE 0.9 % SOLUTION: Performed by: NURSE PRACTITIONER

## 2025-08-04 PROCEDURE — 84100 ASSAY OF PHOSPHORUS: CPT | Performed by: INTERNAL MEDICINE

## 2025-08-04 PROCEDURE — 85018 HEMOGLOBIN: CPT

## 2025-08-04 PROCEDURE — 85045 AUTOMATED RETICULOCYTE COUNT: CPT | Performed by: INTERNAL MEDICINE

## 2025-08-04 PROCEDURE — 25010000002 MAGNESIUM SULFATE 4 GM/100ML SOLUTION: Performed by: INTERNAL MEDICINE

## 2025-08-04 RX ORDER — FERROUS SULFATE 325(65) MG
325 TABLET ORAL
Status: DISCONTINUED | OUTPATIENT
Start: 2025-08-05 | End: 2025-08-11 | Stop reason: HOSPADM

## 2025-08-04 RX ORDER — PROPRANOLOL HCL 20 MG
20 TABLET ORAL DAILY
Status: DISCONTINUED | OUTPATIENT
Start: 2025-08-04 | End: 2025-08-04

## 2025-08-04 RX ORDER — PROPRANOLOL HYDROCHLORIDE 10 MG/1
10 TABLET ORAL 3 TIMES DAILY PRN
Qty: 90 TABLET | Refills: 2 | OUTPATIENT
Start: 2025-08-04

## 2025-08-04 RX ORDER — HEPARIN SODIUM 5000 [USP'U]/ML
5000 INJECTION, SOLUTION INTRAVENOUS; SUBCUTANEOUS EVERY 8 HOURS SCHEDULED
Status: DISCONTINUED | OUTPATIENT
Start: 2025-08-04 | End: 2025-08-11 | Stop reason: HOSPADM

## 2025-08-04 RX ORDER — PROPRANOLOL HCL 20 MG
10 TABLET ORAL EVERY 8 HOURS SCHEDULED
Status: DISCONTINUED | OUTPATIENT
Start: 2025-08-04 | End: 2025-08-04

## 2025-08-04 RX ORDER — PROPRANOLOL HYDROCHLORIDE 10 MG/1
20 TABLET ORAL EVERY 12 HOURS SCHEDULED
Status: DISCONTINUED | OUTPATIENT
Start: 2025-08-04 | End: 2025-08-11 | Stop reason: HOSPADM

## 2025-08-04 RX ORDER — MAGNESIUM SULFATE HEPTAHYDRATE 40 MG/ML
4 INJECTION, SOLUTION INTRAVENOUS ONCE
Status: COMPLETED | OUTPATIENT
Start: 2025-08-04 | End: 2025-08-04

## 2025-08-04 RX ORDER — DIAZEPAM 5 MG/1
5 TABLET ORAL ONCE
Status: COMPLETED | OUTPATIENT
Start: 2025-08-04 | End: 2025-08-04

## 2025-08-04 RX ORDER — METOPROLOL TARTRATE 1 MG/ML
5 INJECTION, SOLUTION INTRAVENOUS ONCE
Status: COMPLETED | OUTPATIENT
Start: 2025-08-04 | End: 2025-08-04

## 2025-08-04 RX ADMIN — SODIUM CHLORIDE 2000 MG: 900 INJECTION INTRAVENOUS at 20:55

## 2025-08-04 RX ADMIN — MAGNESIUM SULFATE IN WATER FOR 4 G: 40 INJECTION INTRAVENOUS at 06:39

## 2025-08-04 RX ADMIN — DIAZEPAM 15 MG: 10 TABLET ORAL at 15:15

## 2025-08-04 RX ADMIN — PROPRANOLOL HYDROCHLORIDE 20 MG: 20 TABLET ORAL at 20:55

## 2025-08-04 RX ADMIN — SODIUM CHLORIDE 500 ML: 9 INJECTION, SOLUTION INTRAVENOUS at 05:09

## 2025-08-04 RX ADMIN — DIAZEPAM 10 MG: 10 TABLET ORAL at 04:25

## 2025-08-04 RX ADMIN — THIAMINE HYDROCHLORIDE 200 MG: 100 INJECTION, SOLUTION INTRAMUSCULAR; INTRAVENOUS at 05:13

## 2025-08-04 RX ADMIN — THIAMINE HYDROCHLORIDE 200 MG: 100 INJECTION, SOLUTION INTRAMUSCULAR; INTRAVENOUS at 14:46

## 2025-08-04 RX ADMIN — DOXEPIN HYDROCHLORIDE 10 MG: 10 CAPSULE ORAL at 20:55

## 2025-08-04 RX ADMIN — SODIUM CHLORIDE 1 MG: 9 INJECTION, SOLUTION INTRAVENOUS at 08:25

## 2025-08-04 RX ADMIN — DIAZEPAM 15 MG: 10 TABLET ORAL at 07:06

## 2025-08-04 RX ADMIN — DIAZEPAM 5 MG: 5 TABLET ORAL at 15:24

## 2025-08-04 RX ADMIN — MUPIROCIN 1 APPLICATION: 20 OINTMENT TOPICAL at 20:55

## 2025-08-04 RX ADMIN — GABAPENTIN 200 MG: 100 CAPSULE ORAL at 14:47

## 2025-08-04 RX ADMIN — Medication 10 ML: at 08:25

## 2025-08-04 RX ADMIN — PROPRANOLOL HYDROCHLORIDE 20 MG: 20 TABLET ORAL at 10:50

## 2025-08-04 RX ADMIN — HEPARIN SODIUM 5000 UNITS: 5000 INJECTION INTRAVENOUS; SUBCUTANEOUS at 21:02

## 2025-08-04 RX ADMIN — PANTOPRAZOLE SODIUM 40 MG: 40 INJECTION, POWDER, FOR SOLUTION INTRAVENOUS at 17:12

## 2025-08-04 RX ADMIN — THIAMINE HYDROCHLORIDE 200 MG: 100 INJECTION, SOLUTION INTRAMUSCULAR; INTRAVENOUS at 21:02

## 2025-08-04 RX ADMIN — Medication 10 ML: at 20:55

## 2025-08-04 RX ADMIN — BUSPIRONE HYDROCHLORIDE 10 MG: 10 TABLET ORAL at 08:25

## 2025-08-04 RX ADMIN — ACETAMINOPHEN 650 MG: 325 TABLET ORAL at 20:55

## 2025-08-04 RX ADMIN — BUSPIRONE HYDROCHLORIDE 10 MG: 10 TABLET ORAL at 20:55

## 2025-08-04 RX ADMIN — PANTOPRAZOLE SODIUM 40 MG: 40 INJECTION, POWDER, FOR SOLUTION INTRAVENOUS at 06:39

## 2025-08-04 RX ADMIN — GABAPENTIN 200 MG: 100 CAPSULE ORAL at 21:02

## 2025-08-04 RX ADMIN — Medication 10 ML: at 20:56

## 2025-08-04 RX ADMIN — PHENOBARBITAL 32.4 MG: 32.4 TABLET ORAL at 08:30

## 2025-08-04 RX ADMIN — HEPARIN SODIUM 5000 UNITS: 5000 INJECTION INTRAVENOUS; SUBCUTANEOUS at 14:46

## 2025-08-04 RX ADMIN — METOROPROLOL TARTRATE 5 MG: 5 INJECTION, SOLUTION INTRAVENOUS at 06:53

## 2025-08-04 RX ADMIN — SODIUM CHLORIDE, POTASSIUM CHLORIDE, SODIUM LACTATE AND CALCIUM CHLORIDE 500 ML: 600; 310; 30; 20 INJECTION, SOLUTION INTRAVENOUS at 09:33

## 2025-08-04 RX ADMIN — GABAPENTIN 200 MG: 100 CAPSULE ORAL at 05:13

## 2025-08-04 RX ADMIN — BUSPIRONE HYDROCHLORIDE 10 MG: 10 TABLET ORAL at 15:15

## 2025-08-04 RX ADMIN — MUPIROCIN 1 APPLICATION: 20 OINTMENT TOPICAL at 08:25

## 2025-08-05 LAB
ANION GAP SERPL CALCULATED.3IONS-SCNC: 7.9 MMOL/L (ref 5–15)
BASOPHILS # BLD AUTO: 0.02 10*3/MM3 (ref 0–0.2)
BASOPHILS NFR BLD AUTO: 0.4 % (ref 0–1.5)
BUN SERPL-MCNC: 10.4 MG/DL (ref 6–20)
BUN/CREAT SERPL: 17.3 (ref 7–25)
CALCIUM SPEC-SCNC: 8.5 MG/DL (ref 8.6–10.5)
CHLORIDE SERPL-SCNC: 104 MMOL/L (ref 98–107)
CO2 SERPL-SCNC: 25.1 MMOL/L (ref 22–29)
CREAT SERPL-MCNC: 0.6 MG/DL (ref 0.57–1)
DEPRECATED RDW RBC AUTO: 66.7 FL (ref 37–54)
EGFRCR SERPLBLD CKD-EPI 2021: 122.5 ML/MIN/1.73
EOSINOPHIL # BLD AUTO: 0.13 10*3/MM3 (ref 0–0.4)
EOSINOPHIL NFR BLD AUTO: 2.5 % (ref 0.3–6.2)
ERYTHROCYTE [DISTWIDTH] IN BLOOD BY AUTOMATED COUNT: 18.6 % (ref 12.3–15.4)
GLUCOSE SERPL-MCNC: 108 MG/DL (ref 65–99)
HAPTOGLOB SERPL-MCNC: 377 MG/DL (ref 30–200)
HCT VFR BLD AUTO: 25.2 % (ref 34–46.6)
HGB BLD-MCNC: 8 G/DL (ref 12–15.9)
IMM GRANULOCYTES # BLD AUTO: 0.13 10*3/MM3 (ref 0–0.05)
IMM GRANULOCYTES NFR BLD AUTO: 2.5 % (ref 0–0.5)
LYMPHOCYTES # BLD AUTO: 1.44 10*3/MM3 (ref 0.7–3.1)
LYMPHOCYTES NFR BLD AUTO: 28.2 % (ref 19.6–45.3)
MAGNESIUM SERPL-MCNC: 1.7 MG/DL (ref 1.6–2.6)
MCH RBC QN AUTO: 31.1 PG (ref 26.6–33)
MCHC RBC AUTO-ENTMCNC: 31.7 G/DL (ref 31.5–35.7)
MCV RBC AUTO: 98.1 FL (ref 79–97)
MONOCYTES # BLD AUTO: 0.49 10*3/MM3 (ref 0.1–0.9)
MONOCYTES NFR BLD AUTO: 9.6 % (ref 5–12)
NEUTROPHILS NFR BLD AUTO: 2.89 10*3/MM3 (ref 1.7–7)
NEUTROPHILS NFR BLD AUTO: 56.8 % (ref 42.7–76)
NRBC BLD AUTO-RTO: 0 /100 WBC (ref 0–0.2)
PLATELET # BLD AUTO: 180 10*3/MM3 (ref 140–450)
PMV BLD AUTO: 11.5 FL (ref 6–12)
POTASSIUM SERPL-SCNC: 4.7 MMOL/L (ref 3.5–5.2)
RBC # BLD AUTO: 2.57 10*6/MM3 (ref 3.77–5.28)
SODIUM SERPL-SCNC: 137 MMOL/L (ref 136–145)
WBC NRBC COR # BLD AUTO: 5.1 10*3/MM3 (ref 3.4–10.8)

## 2025-08-05 PROCEDURE — 25010000002 MAGNESIUM SULFATE 4 GM/100ML SOLUTION: Performed by: NURSE PRACTITIONER

## 2025-08-05 PROCEDURE — 25010000002 THIAMINE PER 100 MG: Performed by: EMERGENCY MEDICINE

## 2025-08-05 PROCEDURE — 97116 GAIT TRAINING THERAPY: CPT

## 2025-08-05 PROCEDURE — 80048 BASIC METABOLIC PNL TOTAL CA: CPT | Performed by: STUDENT IN AN ORGANIZED HEALTH CARE EDUCATION/TRAINING PROGRAM

## 2025-08-05 PROCEDURE — 25010000002 HEPARIN (PORCINE) PER 1000 UNITS: Performed by: STUDENT IN AN ORGANIZED HEALTH CARE EDUCATION/TRAINING PROGRAM

## 2025-08-05 PROCEDURE — 85025 COMPLETE CBC W/AUTO DIFF WBC: CPT | Performed by: STUDENT IN AN ORGANIZED HEALTH CARE EDUCATION/TRAINING PROGRAM

## 2025-08-05 PROCEDURE — 97530 THERAPEUTIC ACTIVITIES: CPT

## 2025-08-05 PROCEDURE — 25010000002 FOLIC ACID 5 MG/ML SOLUTION 10 ML VIAL: Performed by: EMERGENCY MEDICINE

## 2025-08-05 PROCEDURE — 83735 ASSAY OF MAGNESIUM: CPT | Performed by: INTERNAL MEDICINE

## 2025-08-05 PROCEDURE — 99231 SBSQ HOSP IP/OBS SF/LOW 25: CPT | Performed by: STUDENT IN AN ORGANIZED HEALTH CARE EDUCATION/TRAINING PROGRAM

## 2025-08-05 PROCEDURE — 99223 1ST HOSP IP/OBS HIGH 75: CPT | Performed by: STUDENT IN AN ORGANIZED HEALTH CARE EDUCATION/TRAINING PROGRAM

## 2025-08-05 PROCEDURE — 83010 ASSAY OF HAPTOGLOBIN QUANT: CPT | Performed by: INTERNAL MEDICINE

## 2025-08-05 PROCEDURE — 25010000002 CEFTRIAXONE PER 250 MG

## 2025-08-05 RX ORDER — GABAPENTIN 300 MG/1
300 CAPSULE ORAL EVERY 8 HOURS SCHEDULED
Status: DISCONTINUED | OUTPATIENT
Start: 2025-08-05 | End: 2025-08-07

## 2025-08-05 RX ORDER — MAGNESIUM SULFATE HEPTAHYDRATE 40 MG/ML
4 INJECTION, SOLUTION INTRAVENOUS ONCE
Status: COMPLETED | OUTPATIENT
Start: 2025-08-05 | End: 2025-08-05

## 2025-08-05 RX ORDER — HYDROXYZINE HYDROCHLORIDE 25 MG/1
25 TABLET, FILM COATED ORAL 3 TIMES DAILY PRN
Status: DISCONTINUED | OUTPATIENT
Start: 2025-08-05 | End: 2025-08-11 | Stop reason: HOSPADM

## 2025-08-05 RX ADMIN — FERROUS SULFATE TAB 325 MG (65 MG ELEMENTAL FE) 325 MG: 325 (65 FE) TAB at 09:40

## 2025-08-05 RX ADMIN — HEPARIN SODIUM 5000 UNITS: 5000 INJECTION INTRAVENOUS; SUBCUTANEOUS at 05:37

## 2025-08-05 RX ADMIN — Medication 10 ML: at 09:43

## 2025-08-05 RX ADMIN — MAGNESIUM SULFATE IN WATER FOR 4 G: 40 INJECTION INTRAVENOUS at 06:50

## 2025-08-05 RX ADMIN — THIAMINE HYDROCHLORIDE 200 MG: 100 INJECTION, SOLUTION INTRAMUSCULAR; INTRAVENOUS at 14:52

## 2025-08-05 RX ADMIN — PANTOPRAZOLE SODIUM 40 MG: 40 INJECTION, POWDER, FOR SOLUTION INTRAVENOUS at 05:37

## 2025-08-05 RX ADMIN — PHENOBARBITAL 32.4 MG: 32.4 TABLET ORAL at 00:05

## 2025-08-05 RX ADMIN — SODIUM CHLORIDE 2000 MG: 900 INJECTION INTRAVENOUS at 20:41

## 2025-08-05 RX ADMIN — GABAPENTIN 300 MG: 300 CAPSULE ORAL at 20:41

## 2025-08-05 RX ADMIN — PANTOPRAZOLE SODIUM 40 MG: 40 INJECTION, POWDER, FOR SOLUTION INTRAVENOUS at 16:54

## 2025-08-05 RX ADMIN — GABAPENTIN 300 MG: 300 CAPSULE ORAL at 14:52

## 2025-08-05 RX ADMIN — DIAZEPAM 10 MG: 10 TABLET ORAL at 16:57

## 2025-08-05 RX ADMIN — SODIUM CHLORIDE 1 MG: 9 INJECTION, SOLUTION INTRAVENOUS at 09:40

## 2025-08-05 RX ADMIN — Medication 10 ML: at 20:41

## 2025-08-05 RX ADMIN — DIAZEPAM 10 MG: 10 TABLET ORAL at 00:15

## 2025-08-05 RX ADMIN — MUPIROCIN 1 APPLICATION: 20 OINTMENT TOPICAL at 20:41

## 2025-08-05 RX ADMIN — PROPRANOLOL HYDROCHLORIDE 20 MG: 20 TABLET ORAL at 09:41

## 2025-08-05 RX ADMIN — PROPRANOLOL HYDROCHLORIDE 20 MG: 20 TABLET ORAL at 20:41

## 2025-08-05 RX ADMIN — BUSPIRONE HYDROCHLORIDE 10 MG: 10 TABLET ORAL at 20:41

## 2025-08-05 RX ADMIN — MUPIROCIN 1 APPLICATION: 20 OINTMENT TOPICAL at 09:40

## 2025-08-05 RX ADMIN — BUSPIRONE HYDROCHLORIDE 10 MG: 10 TABLET ORAL at 09:40

## 2025-08-05 RX ADMIN — GABAPENTIN 200 MG: 100 CAPSULE ORAL at 05:37

## 2025-08-05 RX ADMIN — HYDROXYZINE HYDROCHLORIDE 25 MG: 25 TABLET ORAL at 14:54

## 2025-08-05 RX ADMIN — DOXEPIN HYDROCHLORIDE 10 MG: 10 CAPSULE ORAL at 20:41

## 2025-08-05 RX ADMIN — BUSPIRONE HYDROCHLORIDE 10 MG: 10 TABLET ORAL at 16:53

## 2025-08-05 RX ADMIN — HEPARIN SODIUM 5000 UNITS: 5000 INJECTION INTRAVENOUS; SUBCUTANEOUS at 21:01

## 2025-08-05 RX ADMIN — HEPARIN SODIUM 5000 UNITS: 5000 INJECTION INTRAVENOUS; SUBCUTANEOUS at 14:51

## 2025-08-05 RX ADMIN — THIAMINE HYDROCHLORIDE 200 MG: 100 INJECTION, SOLUTION INTRAMUSCULAR; INTRAVENOUS at 05:37

## 2025-08-06 ENCOUNTER — APPOINTMENT (OUTPATIENT)
Dept: NEUROLOGY | Facility: HOSPITAL | Age: 30
DRG: 073 | End: 2025-08-06
Payer: COMMERCIAL

## 2025-08-06 LAB
ANION GAP SERPL CALCULATED.3IONS-SCNC: 11 MMOL/L (ref 5–15)
BASOPHILS # BLD AUTO: 0.03 10*3/MM3 (ref 0–0.2)
BASOPHILS NFR BLD AUTO: 0.5 % (ref 0–1.5)
BUN SERPL-MCNC: 9.1 MG/DL (ref 6–20)
BUN/CREAT SERPL: 19.8 (ref 7–25)
CALCIUM SPEC-SCNC: 9 MG/DL (ref 8.6–10.5)
CHLORIDE SERPL-SCNC: 105 MMOL/L (ref 98–107)
CO2 SERPL-SCNC: 24 MMOL/L (ref 22–29)
CREAT SERPL-MCNC: 0.46 MG/DL (ref 0.57–1)
DEPRECATED RDW RBC AUTO: 65.6 FL (ref 37–54)
EGFRCR SERPLBLD CKD-EPI 2021: 130.6 ML/MIN/1.73
EOSINOPHIL # BLD AUTO: 0.09 10*3/MM3 (ref 0–0.4)
EOSINOPHIL NFR BLD AUTO: 1.6 % (ref 0.3–6.2)
ERYTHROCYTE [DISTWIDTH] IN BLOOD BY AUTOMATED COUNT: 18.3 % (ref 12.3–15.4)
GLUCOSE SERPL-MCNC: 112 MG/DL (ref 65–99)
HCT VFR BLD AUTO: 24.3 % (ref 34–46.6)
HGB BLD-MCNC: 7.7 G/DL (ref 12–15.9)
IMM GRANULOCYTES # BLD AUTO: 0.21 10*3/MM3 (ref 0–0.05)
IMM GRANULOCYTES NFR BLD AUTO: 3.7 % (ref 0–0.5)
LYMPHOCYTES # BLD AUTO: 1.33 10*3/MM3 (ref 0.7–3.1)
LYMPHOCYTES NFR BLD AUTO: 23.1 % (ref 19.6–45.3)
MAGNESIUM SERPL-MCNC: 1.4 MG/DL (ref 1.6–2.6)
MCH RBC QN AUTO: 30.9 PG (ref 26.6–33)
MCHC RBC AUTO-ENTMCNC: 31.7 G/DL (ref 31.5–35.7)
MCV RBC AUTO: 97.6 FL (ref 79–97)
MONOCYTES # BLD AUTO: 0.59 10*3/MM3 (ref 0.1–0.9)
MONOCYTES NFR BLD AUTO: 10.3 % (ref 5–12)
NEUTROPHILS NFR BLD AUTO: 3.5 10*3/MM3 (ref 1.7–7)
NEUTROPHILS NFR BLD AUTO: 60.8 % (ref 42.7–76)
NRBC BLD AUTO-RTO: 0 /100 WBC (ref 0–0.2)
PLATELET # BLD AUTO: 207 10*3/MM3 (ref 140–450)
PMV BLD AUTO: 11 FL (ref 6–12)
POTASSIUM SERPL-SCNC: 4.6 MMOL/L (ref 3.5–5.2)
RBC # BLD AUTO: 2.49 10*6/MM3 (ref 3.77–5.28)
SODIUM SERPL-SCNC: 140 MMOL/L (ref 136–145)
WBC NRBC COR # BLD AUTO: 5.75 10*3/MM3 (ref 3.4–10.8)

## 2025-08-06 PROCEDURE — 25010000002 THIAMINE HCL 200 MG/2ML SOLUTION: Performed by: EMERGENCY MEDICINE

## 2025-08-06 PROCEDURE — 80048 BASIC METABOLIC PNL TOTAL CA: CPT | Performed by: STUDENT IN AN ORGANIZED HEALTH CARE EDUCATION/TRAINING PROGRAM

## 2025-08-06 PROCEDURE — 95886 MUSC TEST DONE W/N TEST COMP: CPT

## 2025-08-06 PROCEDURE — 97535 SELF CARE MNGMENT TRAINING: CPT

## 2025-08-06 PROCEDURE — 25010000002 FOLIC ACID 5 MG/ML SOLUTION 10 ML VIAL: Performed by: EMERGENCY MEDICINE

## 2025-08-06 PROCEDURE — 95911 NRV CNDJ TEST 9-10 STUDIES: CPT

## 2025-08-06 PROCEDURE — 25010000002 HEPARIN (PORCINE) PER 1000 UNITS: Performed by: STUDENT IN AN ORGANIZED HEALTH CARE EDUCATION/TRAINING PROGRAM

## 2025-08-06 PROCEDURE — 99231 SBSQ HOSP IP/OBS SF/LOW 25: CPT | Performed by: STUDENT IN AN ORGANIZED HEALTH CARE EDUCATION/TRAINING PROGRAM

## 2025-08-06 PROCEDURE — 99233 SBSQ HOSP IP/OBS HIGH 50: CPT | Performed by: STUDENT IN AN ORGANIZED HEALTH CARE EDUCATION/TRAINING PROGRAM

## 2025-08-06 PROCEDURE — 85025 COMPLETE CBC W/AUTO DIFF WBC: CPT | Performed by: STUDENT IN AN ORGANIZED HEALTH CARE EDUCATION/TRAINING PROGRAM

## 2025-08-06 PROCEDURE — 25010000002 MAGNESIUM SULFATE 2 GM/50ML SOLUTION: Performed by: STUDENT IN AN ORGANIZED HEALTH CARE EDUCATION/TRAINING PROGRAM

## 2025-08-06 PROCEDURE — 83735 ASSAY OF MAGNESIUM: CPT | Performed by: NURSE PRACTITIONER

## 2025-08-06 PROCEDURE — 95911 NRV CNDJ TEST 9-10 STUDIES: CPT | Performed by: PSYCHIATRY & NEUROLOGY

## 2025-08-06 PROCEDURE — 95886 MUSC TEST DONE W/N TEST COMP: CPT | Performed by: PSYCHIATRY & NEUROLOGY

## 2025-08-06 PROCEDURE — 99232 SBSQ HOSP IP/OBS MODERATE 35: CPT | Performed by: PHYSICIAN ASSISTANT

## 2025-08-06 PROCEDURE — 97530 THERAPEUTIC ACTIVITIES: CPT

## 2025-08-06 RX ORDER — QUETIAPINE FUMARATE 25 MG/1
25 TABLET, FILM COATED ORAL 2 TIMES DAILY PRN
Status: DISCONTINUED | OUTPATIENT
Start: 2025-08-06 | End: 2025-08-11 | Stop reason: HOSPADM

## 2025-08-06 RX ORDER — FOLIC ACID 1 MG/1
1 TABLET ORAL DAILY
Status: DISCONTINUED | OUTPATIENT
Start: 2025-08-07 | End: 2025-08-11 | Stop reason: HOSPADM

## 2025-08-06 RX ORDER — MAGNESIUM SULFATE HEPTAHYDRATE 40 MG/ML
2 INJECTION, SOLUTION INTRAVENOUS
Status: COMPLETED | OUTPATIENT
Start: 2025-08-06 | End: 2025-08-06

## 2025-08-06 RX ADMIN — MAGNESIUM SULFATE HEPTAHYDRATE 2 G: 40 INJECTION, SOLUTION INTRAVENOUS at 14:21

## 2025-08-06 RX ADMIN — HEPARIN SODIUM 5000 UNITS: 5000 INJECTION INTRAVENOUS; SUBCUTANEOUS at 21:55

## 2025-08-06 RX ADMIN — THIAMINE HYDROCHLORIDE 200 MG: 100 INJECTION, SOLUTION INTRAMUSCULAR; INTRAVENOUS at 06:00

## 2025-08-06 RX ADMIN — Medication 10 ML: at 08:47

## 2025-08-06 RX ADMIN — Medication 10 ML: at 21:14

## 2025-08-06 RX ADMIN — BUSPIRONE HYDROCHLORIDE 10 MG: 10 TABLET ORAL at 08:47

## 2025-08-06 RX ADMIN — SODIUM CHLORIDE 1 MG: 9 INJECTION, SOLUTION INTRAVENOUS at 08:48

## 2025-08-06 RX ADMIN — PANTOPRAZOLE SODIUM 40 MG: 40 INJECTION, POWDER, FOR SOLUTION INTRAVENOUS at 06:01

## 2025-08-06 RX ADMIN — HEPARIN SODIUM 5000 UNITS: 5000 INJECTION INTRAVENOUS; SUBCUTANEOUS at 14:20

## 2025-08-06 RX ADMIN — GABAPENTIN 300 MG: 300 CAPSULE ORAL at 14:20

## 2025-08-06 RX ADMIN — FERROUS SULFATE TAB 325 MG (65 MG ELEMENTAL FE) 325 MG: 325 (65 FE) TAB at 08:47

## 2025-08-06 RX ADMIN — BUSPIRONE HYDROCHLORIDE 10 MG: 10 TABLET ORAL at 16:44

## 2025-08-06 RX ADMIN — GABAPENTIN 300 MG: 300 CAPSULE ORAL at 06:00

## 2025-08-06 RX ADMIN — Medication 5 MG: at 20:26

## 2025-08-06 RX ADMIN — BUSPIRONE HYDROCHLORIDE 10 MG: 10 TABLET ORAL at 20:26

## 2025-08-06 RX ADMIN — GABAPENTIN 300 MG: 300 CAPSULE ORAL at 21:55

## 2025-08-06 RX ADMIN — MAGNESIUM SULFATE HEPTAHYDRATE 2 G: 40 INJECTION, SOLUTION INTRAVENOUS at 12:24

## 2025-08-06 RX ADMIN — QUETIAPINE FUMARATE 25 MG: 25 TABLET ORAL at 14:20

## 2025-08-06 RX ADMIN — MUPIROCIN 1 APPLICATION: 20 OINTMENT TOPICAL at 08:47

## 2025-08-06 RX ADMIN — PANTOPRAZOLE SODIUM 40 MG: 40 INJECTION, POWDER, FOR SOLUTION INTRAVENOUS at 16:44

## 2025-08-06 RX ADMIN — Medication 10 ML: at 21:13

## 2025-08-06 RX ADMIN — DOXEPIN HYDROCHLORIDE 10 MG: 10 CAPSULE ORAL at 20:27

## 2025-08-06 RX ADMIN — PROPRANOLOL HYDROCHLORIDE 20 MG: 20 TABLET ORAL at 08:47

## 2025-08-06 RX ADMIN — MAGNESIUM SULFATE HEPTAHYDRATE 2 G: 40 INJECTION, SOLUTION INTRAVENOUS at 08:47

## 2025-08-06 RX ADMIN — HEPARIN SODIUM 5000 UNITS: 5000 INJECTION INTRAVENOUS; SUBCUTANEOUS at 06:00

## 2025-08-06 RX ADMIN — THIAMINE HYDROCHLORIDE 200 MG: 100 INJECTION, SOLUTION INTRAMUSCULAR; INTRAVENOUS at 14:21

## 2025-08-06 RX ADMIN — PROPRANOLOL HYDROCHLORIDE 20 MG: 20 TABLET ORAL at 20:23

## 2025-08-07 LAB
ANION GAP SERPL CALCULATED.3IONS-SCNC: 12 MMOL/L (ref 5–15)
BACTERIA SPEC AEROBE CULT: NORMAL
BACTERIA SPEC AEROBE CULT: NORMAL
BASOPHILS # BLD AUTO: 0.04 10*3/MM3 (ref 0–0.2)
BASOPHILS NFR BLD AUTO: 0.6 % (ref 0–1.5)
BUN SERPL-MCNC: 8.6 MG/DL (ref 6–20)
BUN/CREAT SERPL: 17.2 (ref 7–25)
CALCIUM SPEC-SCNC: 9.3 MG/DL (ref 8.6–10.5)
CHLORIDE SERPL-SCNC: 103 MMOL/L (ref 98–107)
CO2 SERPL-SCNC: 23 MMOL/L (ref 22–29)
CREAT SERPL-MCNC: 0.5 MG/DL (ref 0.57–1)
DEPRECATED RDW RBC AUTO: 65.9 FL (ref 37–54)
EGFRCR SERPLBLD CKD-EPI 2021: 128 ML/MIN/1.73
EOSINOPHIL # BLD AUTO: 0.11 10*3/MM3 (ref 0–0.4)
EOSINOPHIL NFR BLD AUTO: 1.6 % (ref 0.3–6.2)
ERYTHROCYTE [DISTWIDTH] IN BLOOD BY AUTOMATED COUNT: 18.2 % (ref 12.3–15.4)
GLUCOSE SERPL-MCNC: 131 MG/DL (ref 65–99)
HCT VFR BLD AUTO: 24.6 % (ref 34–46.6)
HGB BLD-MCNC: 7.7 G/DL (ref 12–15.9)
IMM GRANULOCYTES # BLD AUTO: 0.27 10*3/MM3 (ref 0–0.05)
IMM GRANULOCYTES NFR BLD AUTO: 4 % (ref 0–0.5)
LYMPHOCYTES # BLD AUTO: 1.43 10*3/MM3 (ref 0.7–3.1)
LYMPHOCYTES NFR BLD AUTO: 21.4 % (ref 19.6–45.3)
MAGNESIUM SERPL-MCNC: 2.1 MG/DL (ref 1.6–2.6)
MCH RBC QN AUTO: 30.9 PG (ref 26.6–33)
MCHC RBC AUTO-ENTMCNC: 31.3 G/DL (ref 31.5–35.7)
MCV RBC AUTO: 98.8 FL (ref 79–97)
MONOCYTES # BLD AUTO: 0.63 10*3/MM3 (ref 0.1–0.9)
MONOCYTES NFR BLD AUTO: 9.4 % (ref 5–12)
NEUTROPHILS NFR BLD AUTO: 4.21 10*3/MM3 (ref 1.7–7)
NEUTROPHILS NFR BLD AUTO: 63 % (ref 42.7–76)
NRBC BLD AUTO-RTO: 0 /100 WBC (ref 0–0.2)
PLATELET # BLD AUTO: 236 10*3/MM3 (ref 140–450)
PMV BLD AUTO: 11.3 FL (ref 6–12)
POTASSIUM SERPL-SCNC: 4.9 MMOL/L (ref 3.5–5.2)
RBC # BLD AUTO: 2.49 10*6/MM3 (ref 3.77–5.28)
SODIUM SERPL-SCNC: 138 MMOL/L (ref 136–145)
WBC NRBC COR # BLD AUTO: 6.69 10*3/MM3 (ref 3.4–10.8)

## 2025-08-07 PROCEDURE — 85025 COMPLETE CBC W/AUTO DIFF WBC: CPT | Performed by: STUDENT IN AN ORGANIZED HEALTH CARE EDUCATION/TRAINING PROGRAM

## 2025-08-07 PROCEDURE — 99232 SBSQ HOSP IP/OBS MODERATE 35: CPT | Performed by: FAMILY MEDICINE

## 2025-08-07 PROCEDURE — 25010000002 HEPARIN (PORCINE) PER 1000 UNITS: Performed by: STUDENT IN AN ORGANIZED HEALTH CARE EDUCATION/TRAINING PROGRAM

## 2025-08-07 PROCEDURE — 80048 BASIC METABOLIC PNL TOTAL CA: CPT | Performed by: STUDENT IN AN ORGANIZED HEALTH CARE EDUCATION/TRAINING PROGRAM

## 2025-08-07 PROCEDURE — 83735 ASSAY OF MAGNESIUM: CPT | Performed by: STUDENT IN AN ORGANIZED HEALTH CARE EDUCATION/TRAINING PROGRAM

## 2025-08-07 PROCEDURE — 97110 THERAPEUTIC EXERCISES: CPT

## 2025-08-07 PROCEDURE — 99232 SBSQ HOSP IP/OBS MODERATE 35: CPT | Performed by: PHYSICIAN ASSISTANT

## 2025-08-07 PROCEDURE — 97530 THERAPEUTIC ACTIVITIES: CPT

## 2025-08-07 PROCEDURE — 99233 SBSQ HOSP IP/OBS HIGH 50: CPT | Performed by: STUDENT IN AN ORGANIZED HEALTH CARE EDUCATION/TRAINING PROGRAM

## 2025-08-07 RX ORDER — GABAPENTIN 300 MG/1
300 CAPSULE ORAL 2 TIMES DAILY
Status: DISCONTINUED | OUTPATIENT
Start: 2025-08-08 | End: 2025-08-09

## 2025-08-07 RX ORDER — GABAPENTIN 400 MG/1
400 CAPSULE ORAL NIGHTLY
Status: DISCONTINUED | OUTPATIENT
Start: 2025-08-07 | End: 2025-08-11 | Stop reason: HOSPADM

## 2025-08-07 RX ORDER — HYDROCODONE BITARTRATE AND ACETAMINOPHEN 5; 325 MG/1; MG/1
1 TABLET ORAL ONCE AS NEEDED
Refills: 0 | Status: COMPLETED | OUTPATIENT
Start: 2025-08-07 | End: 2025-08-07

## 2025-08-07 RX ADMIN — HYDROXYZINE HYDROCHLORIDE 25 MG: 25 TABLET ORAL at 16:10

## 2025-08-07 RX ADMIN — BUSPIRONE HYDROCHLORIDE 10 MG: 10 TABLET ORAL at 21:45

## 2025-08-07 RX ADMIN — Medication 10 ML: at 21:50

## 2025-08-07 RX ADMIN — HEPARIN SODIUM 5000 UNITS: 5000 INJECTION INTRAVENOUS; SUBCUTANEOUS at 05:22

## 2025-08-07 RX ADMIN — PROPRANOLOL HYDROCHLORIDE 20 MG: 20 TABLET ORAL at 21:46

## 2025-08-07 RX ADMIN — HYDROCODONE BITARTRATE AND ACETAMINOPHEN 1 TABLET: 5; 325 TABLET ORAL at 21:52

## 2025-08-07 RX ADMIN — Medication 100 MG: at 09:18

## 2025-08-07 RX ADMIN — HEPARIN SODIUM 5000 UNITS: 5000 INJECTION INTRAVENOUS; SUBCUTANEOUS at 14:05

## 2025-08-07 RX ADMIN — GABAPENTIN 300 MG: 300 CAPSULE ORAL at 14:05

## 2025-08-07 RX ADMIN — FOLIC ACID 1 MG: 1 TABLET ORAL at 09:14

## 2025-08-07 RX ADMIN — Medication 10 ML: at 09:15

## 2025-08-07 RX ADMIN — ACETAMINOPHEN 650 MG: 325 TABLET ORAL at 03:05

## 2025-08-07 RX ADMIN — Medication 5 MG: at 21:45

## 2025-08-07 RX ADMIN — Medication 10 ML: at 09:18

## 2025-08-07 RX ADMIN — PANTOPRAZOLE SODIUM 40 MG: 40 INJECTION, POWDER, FOR SOLUTION INTRAVENOUS at 16:10

## 2025-08-07 RX ADMIN — GABAPENTIN 400 MG: 400 CAPSULE ORAL at 21:45

## 2025-08-07 RX ADMIN — HEPARIN SODIUM 5000 UNITS: 5000 INJECTION INTRAVENOUS; SUBCUTANEOUS at 21:44

## 2025-08-07 RX ADMIN — DOXEPIN HYDROCHLORIDE 10 MG: 10 CAPSULE ORAL at 21:46

## 2025-08-07 RX ADMIN — Medication 10 ML: at 21:51

## 2025-08-07 RX ADMIN — FERROUS SULFATE TAB 325 MG (65 MG ELEMENTAL FE) 325 MG: 325 (65 FE) TAB at 09:14

## 2025-08-07 RX ADMIN — PANTOPRAZOLE SODIUM 40 MG: 40 INJECTION, POWDER, FOR SOLUTION INTRAVENOUS at 09:14

## 2025-08-07 RX ADMIN — BUSPIRONE HYDROCHLORIDE 10 MG: 10 TABLET ORAL at 16:10

## 2025-08-07 RX ADMIN — PROPRANOLOL HYDROCHLORIDE 20 MG: 20 TABLET ORAL at 09:14

## 2025-08-07 RX ADMIN — GABAPENTIN 300 MG: 300 CAPSULE ORAL at 03:30

## 2025-08-07 RX ADMIN — BUSPIRONE HYDROCHLORIDE 10 MG: 10 TABLET ORAL at 09:14

## 2025-08-08 LAB
ALBUMIN SERPL-MCNC: 3.4 G/DL (ref 3.5–5.2)
ALBUMIN/GLOB SERPL: 1.1 G/DL
ALP SERPL-CCNC: 219 U/L (ref 39–117)
ALT SERPL W P-5'-P-CCNC: 50 U/L (ref 1–33)
ANION GAP SERPL CALCULATED.3IONS-SCNC: 11 MMOL/L (ref 5–15)
ANISOCYTOSIS BLD QL: ABNORMAL
AST SERPL-CCNC: 156 U/L (ref 1–32)
BASOPHILS # BLD MANUAL: 0 10*3/MM3 (ref 0–0.2)
BASOPHILS NFR BLD MANUAL: 0 % (ref 0–1.5)
BILIRUB SERPL-MCNC: 0.6 MG/DL (ref 0–1.2)
BUN SERPL-MCNC: 9.9 MG/DL (ref 6–20)
BUN/CREAT SERPL: 16.2 (ref 7–25)
CALCIUM SPEC-SCNC: 9.6 MG/DL (ref 8.6–10.5)
CHLORIDE SERPL-SCNC: 106 MMOL/L (ref 98–107)
CO2 SERPL-SCNC: 23 MMOL/L (ref 22–29)
CREAT SERPL-MCNC: 0.61 MG/DL (ref 0.57–1)
DEPRECATED RDW RBC AUTO: 65.5 FL (ref 37–54)
EGFRCR SERPLBLD CKD-EPI 2021: 122 ML/MIN/1.73
EOSINOPHIL # BLD MANUAL: 0 10*3/MM3 (ref 0–0.4)
EOSINOPHIL NFR BLD MANUAL: 0 % (ref 0.3–6.2)
ERYTHROCYTE [DISTWIDTH] IN BLOOD BY AUTOMATED COUNT: 18.1 % (ref 12.3–15.4)
GLOBULIN UR ELPH-MCNC: 3.2 GM/DL
GLUCOSE SERPL-MCNC: 137 MG/DL (ref 65–99)
HCT VFR BLD AUTO: 25 % (ref 34–46.6)
HGB BLD-MCNC: 7.8 G/DL (ref 12–15.9)
LYMPHOCYTES # BLD MANUAL: 1.57 10*3/MM3 (ref 0.7–3.1)
LYMPHOCYTES NFR BLD MANUAL: 5 % (ref 5–12)
MAGNESIUM SERPL-MCNC: 1.6 MG/DL (ref 1.6–2.6)
MCH RBC QN AUTO: 30.7 PG (ref 26.6–33)
MCHC RBC AUTO-ENTMCNC: 31.2 G/DL (ref 31.5–35.7)
MCV RBC AUTO: 98.4 FL (ref 79–97)
MONOCYTES # BLD: 0.33 10*3/MM3 (ref 0.1–0.9)
MYELOCYTES NFR BLD MANUAL: 2 % (ref 0–0)
NEUTROPHILS # BLD AUTO: 4.51 10*3/MM3 (ref 1.7–7)
NEUTROPHILS NFR BLD MANUAL: 69 % (ref 42.7–76)
NRBC SPEC MANUAL: 0 /100 WBC (ref 0–0.2)
PLAT MORPH BLD: NORMAL
PLATELET # BLD AUTO: 241 10*3/MM3 (ref 140–450)
PMV BLD AUTO: 11.4 FL (ref 6–12)
POTASSIUM SERPL-SCNC: 4.8 MMOL/L (ref 3.5–5.2)
PROT SERPL-MCNC: 6.6 G/DL (ref 6–8.5)
QT INTERVAL: 312 MS
QTC INTERVAL: 410 MS
RBC # BLD AUTO: 2.54 10*6/MM3 (ref 3.77–5.28)
SODIUM SERPL-SCNC: 140 MMOL/L (ref 136–145)
VARIANT LYMPHS NFR BLD MANUAL: 24 % (ref 19.6–45.3)
WBC MORPH BLD: NORMAL
WBC NRBC COR # BLD AUTO: 6.53 10*3/MM3 (ref 3.4–10.8)

## 2025-08-08 PROCEDURE — 25010000002 MAGNESIUM SULFATE 4 GM/100ML SOLUTION: Performed by: FAMILY MEDICINE

## 2025-08-08 PROCEDURE — 85007 BL SMEAR W/DIFF WBC COUNT: CPT | Performed by: FAMILY MEDICINE

## 2025-08-08 PROCEDURE — 25010000002 HEPARIN (PORCINE) PER 1000 UNITS: Performed by: STUDENT IN AN ORGANIZED HEALTH CARE EDUCATION/TRAINING PROGRAM

## 2025-08-08 PROCEDURE — 93005 ELECTROCARDIOGRAM TRACING: CPT | Performed by: FAMILY MEDICINE

## 2025-08-08 PROCEDURE — 85025 COMPLETE CBC W/AUTO DIFF WBC: CPT | Performed by: FAMILY MEDICINE

## 2025-08-08 PROCEDURE — 80053 COMPREHEN METABOLIC PANEL: CPT | Performed by: FAMILY MEDICINE

## 2025-08-08 PROCEDURE — 83735 ASSAY OF MAGNESIUM: CPT | Performed by: FAMILY MEDICINE

## 2025-08-08 PROCEDURE — 93010 ELECTROCARDIOGRAM REPORT: CPT | Performed by: INTERNAL MEDICINE

## 2025-08-08 PROCEDURE — 99232 SBSQ HOSP IP/OBS MODERATE 35: CPT | Performed by: FAMILY MEDICINE

## 2025-08-08 RX ORDER — SODIUM, POTASSIUM,MAG SULFATES 17.5-3.13G
1 SOLUTION, RECONSTITUTED, ORAL ORAL TAKE AS DIRECTED
Qty: 354 ML | Refills: 0 | OUTPATIENT
Start: 2025-08-08 | End: 2025-08-11 | Stop reason: HOSPADM

## 2025-08-08 RX ORDER — MAGNESIUM SULFATE HEPTAHYDRATE 40 MG/ML
4 INJECTION, SOLUTION INTRAVENOUS ONCE
Status: COMPLETED | OUTPATIENT
Start: 2025-08-08 | End: 2025-08-08

## 2025-08-08 RX ADMIN — GABAPENTIN 300 MG: 300 CAPSULE ORAL at 09:02

## 2025-08-08 RX ADMIN — PROPRANOLOL HYDROCHLORIDE 20 MG: 20 TABLET ORAL at 09:02

## 2025-08-08 RX ADMIN — GABAPENTIN 300 MG: 300 CAPSULE ORAL at 13:22

## 2025-08-08 RX ADMIN — QUETIAPINE FUMARATE 25 MG: 25 TABLET ORAL at 04:45

## 2025-08-08 RX ADMIN — Medication 10 ML: at 09:05

## 2025-08-08 RX ADMIN — Medication 10 ML: at 21:32

## 2025-08-08 RX ADMIN — HEPARIN SODIUM 5000 UNITS: 5000 INJECTION INTRAVENOUS; SUBCUTANEOUS at 07:03

## 2025-08-08 RX ADMIN — PANTOPRAZOLE SODIUM 40 MG: 40 INJECTION, POWDER, FOR SOLUTION INTRAVENOUS at 16:39

## 2025-08-08 RX ADMIN — BUSPIRONE HYDROCHLORIDE 10 MG: 10 TABLET ORAL at 21:33

## 2025-08-08 RX ADMIN — HEPARIN SODIUM 5000 UNITS: 5000 INJECTION INTRAVENOUS; SUBCUTANEOUS at 13:23

## 2025-08-08 RX ADMIN — FERROUS SULFATE TAB 325 MG (65 MG ELEMENTAL FE) 325 MG: 325 (65 FE) TAB at 09:02

## 2025-08-08 RX ADMIN — MAGNESIUM SULFATE IN WATER FOR 4 G: 40 INJECTION INTRAVENOUS at 09:04

## 2025-08-08 RX ADMIN — ACETAMINOPHEN 650 MG: 325 TABLET ORAL at 04:44

## 2025-08-08 RX ADMIN — GABAPENTIN 400 MG: 400 CAPSULE ORAL at 21:33

## 2025-08-08 RX ADMIN — ACETAMINOPHEN 650 MG: 325 TABLET ORAL at 11:55

## 2025-08-08 RX ADMIN — PANTOPRAZOLE SODIUM 40 MG: 40 INJECTION, POWDER, FOR SOLUTION INTRAVENOUS at 09:04

## 2025-08-08 RX ADMIN — Medication 5 MG: at 21:34

## 2025-08-08 RX ADMIN — FOLIC ACID 1 MG: 1 TABLET ORAL at 09:02

## 2025-08-08 RX ADMIN — HEPARIN SODIUM 5000 UNITS: 5000 INJECTION INTRAVENOUS; SUBCUTANEOUS at 21:33

## 2025-08-08 RX ADMIN — BUSPIRONE HYDROCHLORIDE 10 MG: 10 TABLET ORAL at 09:02

## 2025-08-08 RX ADMIN — HYDROXYZINE HYDROCHLORIDE 25 MG: 25 TABLET ORAL at 13:23

## 2025-08-08 RX ADMIN — BUSPIRONE HYDROCHLORIDE 10 MG: 10 TABLET ORAL at 16:39

## 2025-08-08 RX ADMIN — DOXEPIN HYDROCHLORIDE 10 MG: 10 CAPSULE ORAL at 21:33

## 2025-08-08 RX ADMIN — PROPRANOLOL HYDROCHLORIDE 20 MG: 20 TABLET ORAL at 21:34

## 2025-08-08 RX ADMIN — HYDROXYZINE HYDROCHLORIDE 25 MG: 25 TABLET ORAL at 04:44

## 2025-08-08 RX ADMIN — Medication 100 MG: at 09:02

## 2025-08-09 LAB
ALBUMIN SERPL-MCNC: 3.5 G/DL (ref 3.5–5.2)
ALBUMIN/GLOB SERPL: 1 G/DL
ALP SERPL-CCNC: 200 U/L (ref 39–117)
ALT SERPL W P-5'-P-CCNC: 60 U/L (ref 1–33)
ANION GAP SERPL CALCULATED.3IONS-SCNC: 13.6 MMOL/L (ref 5–15)
AST SERPL-CCNC: 146 U/L (ref 1–32)
BASOPHILS # BLD AUTO: 0.07 10*3/MM3 (ref 0–0.2)
BASOPHILS NFR BLD AUTO: 1 % (ref 0–1.5)
BILIRUB SERPL-MCNC: 0.6 MG/DL (ref 0–1.2)
BUN SERPL-MCNC: 15.8 MG/DL (ref 6–20)
BUN/CREAT SERPL: 25.9 (ref 7–25)
CALCIUM SPEC-SCNC: 9.9 MG/DL (ref 8.6–10.5)
CHLORIDE SERPL-SCNC: 105 MMOL/L (ref 98–107)
CO2 SERPL-SCNC: 20.4 MMOL/L (ref 22–29)
CREAT SERPL-MCNC: 0.61 MG/DL (ref 0.57–1)
DEPRECATED RDW RBC AUTO: 66.4 FL (ref 37–54)
EGFRCR SERPLBLD CKD-EPI 2021: 122 ML/MIN/1.73
EOSINOPHIL # BLD AUTO: 0.06 10*3/MM3 (ref 0–0.4)
EOSINOPHIL NFR BLD AUTO: 0.9 % (ref 0.3–6.2)
ERYTHROCYTE [DISTWIDTH] IN BLOOD BY AUTOMATED COUNT: 18 % (ref 12.3–15.4)
GLOBULIN UR ELPH-MCNC: 3.6 GM/DL
GLUCOSE SERPL-MCNC: 222 MG/DL (ref 65–99)
HCT VFR BLD AUTO: 27.9 % (ref 34–46.6)
HGB BLD-MCNC: 8.7 G/DL (ref 12–15.9)
IMM GRANULOCYTES # BLD AUTO: 0.31 10*3/MM3 (ref 0–0.05)
IMM GRANULOCYTES NFR BLD AUTO: 4.6 % (ref 0–0.5)
LYMPHOCYTES # BLD AUTO: 1.71 10*3/MM3 (ref 0.7–3.1)
LYMPHOCYTES NFR BLD AUTO: 25.2 % (ref 19.6–45.3)
MAGNESIUM SERPL-MCNC: 1.9 MG/DL (ref 1.6–2.6)
MCH RBC QN AUTO: 31.6 PG (ref 26.6–33)
MCHC RBC AUTO-ENTMCNC: 31.2 G/DL (ref 31.5–35.7)
MCV RBC AUTO: 101.5 FL (ref 79–97)
MONOCYTES # BLD AUTO: 0.68 10*3/MM3 (ref 0.1–0.9)
MONOCYTES NFR BLD AUTO: 10 % (ref 5–12)
NEUTROPHILS NFR BLD AUTO: 3.95 10*3/MM3 (ref 1.7–7)
NEUTROPHILS NFR BLD AUTO: 58.3 % (ref 42.7–76)
NRBC BLD AUTO-RTO: 0 /100 WBC (ref 0–0.2)
PLATELET # BLD AUTO: 285 10*3/MM3 (ref 140–450)
PMV BLD AUTO: 11.1 FL (ref 6–12)
POTASSIUM SERPL-SCNC: 4.8 MMOL/L (ref 3.5–5.2)
PROT SERPL-MCNC: 7.1 G/DL (ref 6–8.5)
RBC # BLD AUTO: 2.75 10*6/MM3 (ref 3.77–5.28)
SODIUM SERPL-SCNC: 139 MMOL/L (ref 136–145)
WBC NRBC COR # BLD AUTO: 6.78 10*3/MM3 (ref 3.4–10.8)

## 2025-08-09 PROCEDURE — 25010000002 HEPARIN (PORCINE) PER 1000 UNITS: Performed by: STUDENT IN AN ORGANIZED HEALTH CARE EDUCATION/TRAINING PROGRAM

## 2025-08-09 PROCEDURE — 25010000002 MAGNESIUM SULFATE 4 GM/100ML SOLUTION: Performed by: FAMILY MEDICINE

## 2025-08-09 PROCEDURE — 99232 SBSQ HOSP IP/OBS MODERATE 35: CPT | Performed by: FAMILY MEDICINE

## 2025-08-09 PROCEDURE — 85025 COMPLETE CBC W/AUTO DIFF WBC: CPT | Performed by: FAMILY MEDICINE

## 2025-08-09 PROCEDURE — 80053 COMPREHEN METABOLIC PANEL: CPT | Performed by: FAMILY MEDICINE

## 2025-08-09 PROCEDURE — 83735 ASSAY OF MAGNESIUM: CPT | Performed by: FAMILY MEDICINE

## 2025-08-09 RX ORDER — MAGNESIUM SULFATE HEPTAHYDRATE 40 MG/ML
4 INJECTION, SOLUTION INTRAVENOUS ONCE
Status: COMPLETED | OUTPATIENT
Start: 2025-08-09 | End: 2025-08-09

## 2025-08-09 RX ORDER — GABAPENTIN 400 MG/1
400 CAPSULE ORAL 2 TIMES DAILY
Status: DISCONTINUED | OUTPATIENT
Start: 2025-08-09 | End: 2025-08-11 | Stop reason: HOSPADM

## 2025-08-09 RX ADMIN — HYDROXYZINE HYDROCHLORIDE 25 MG: 25 TABLET ORAL at 20:09

## 2025-08-09 RX ADMIN — FERROUS SULFATE TAB 325 MG (65 MG ELEMENTAL FE) 325 MG: 325 (65 FE) TAB at 08:54

## 2025-08-09 RX ADMIN — FOLIC ACID 1 MG: 1 TABLET ORAL at 08:54

## 2025-08-09 RX ADMIN — Medication 10 ML: at 20:09

## 2025-08-09 RX ADMIN — DICLOFENAC SODIUM 2 G: 10 GEL TOPICAL at 20:10

## 2025-08-09 RX ADMIN — Medication 100 MG: at 08:54

## 2025-08-09 RX ADMIN — PROPRANOLOL HYDROCHLORIDE 20 MG: 20 TABLET ORAL at 20:09

## 2025-08-09 RX ADMIN — Medication 10 ML: at 08:54

## 2025-08-09 RX ADMIN — MAGNESIUM SULFATE IN WATER FOR 4 G: 40 INJECTION INTRAVENOUS at 17:12

## 2025-08-09 RX ADMIN — GABAPENTIN 400 MG: 400 CAPSULE ORAL at 20:08

## 2025-08-09 RX ADMIN — DICLOFENAC SODIUM 2 G: 10 GEL TOPICAL at 17:13

## 2025-08-09 RX ADMIN — ACETAMINOPHEN 650 MG: 325 TABLET ORAL at 05:30

## 2025-08-09 RX ADMIN — PROPRANOLOL HYDROCHLORIDE 20 MG: 20 TABLET ORAL at 08:54

## 2025-08-09 RX ADMIN — PANTOPRAZOLE SODIUM 40 MG: 40 INJECTION, POWDER, FOR SOLUTION INTRAVENOUS at 08:55

## 2025-08-09 RX ADMIN — Medication 10 ML: at 20:10

## 2025-08-09 RX ADMIN — BUSPIRONE HYDROCHLORIDE 10 MG: 10 TABLET ORAL at 20:09

## 2025-08-09 RX ADMIN — PANTOPRAZOLE SODIUM 40 MG: 40 INJECTION, POWDER, FOR SOLUTION INTRAVENOUS at 17:12

## 2025-08-09 RX ADMIN — GABAPENTIN 300 MG: 300 CAPSULE ORAL at 08:54

## 2025-08-09 RX ADMIN — DOXEPIN HYDROCHLORIDE 10 MG: 10 CAPSULE ORAL at 20:09

## 2025-08-09 RX ADMIN — DICLOFENAC SODIUM 2 G: 10 GEL TOPICAL at 13:12

## 2025-08-09 RX ADMIN — HEPARIN SODIUM 5000 UNITS: 5000 INJECTION INTRAVENOUS; SUBCUTANEOUS at 05:57

## 2025-08-09 RX ADMIN — GABAPENTIN 400 MG: 400 CAPSULE ORAL at 13:11

## 2025-08-09 RX ADMIN — Medication 10 ML: at 08:55

## 2025-08-09 RX ADMIN — BUSPIRONE HYDROCHLORIDE 10 MG: 10 TABLET ORAL at 17:12

## 2025-08-09 RX ADMIN — HEPARIN SODIUM 5000 UNITS: 5000 INJECTION INTRAVENOUS; SUBCUTANEOUS at 13:11

## 2025-08-09 RX ADMIN — HEPARIN SODIUM 5000 UNITS: 5000 INJECTION INTRAVENOUS; SUBCUTANEOUS at 20:10

## 2025-08-09 RX ADMIN — BUSPIRONE HYDROCHLORIDE 10 MG: 10 TABLET ORAL at 08:54

## 2025-08-10 LAB
ALBUMIN SERPL-MCNC: 3.8 G/DL (ref 3.5–5.2)
ALBUMIN/GLOB SERPL: 1.1 G/DL
ALP SERPL-CCNC: 200 U/L (ref 39–117)
ALT SERPL W P-5'-P-CCNC: 73 U/L (ref 1–33)
ANION GAP SERPL CALCULATED.3IONS-SCNC: 13.4 MMOL/L (ref 5–15)
AST SERPL-CCNC: 173 U/L (ref 1–32)
BASOPHILS # BLD AUTO: 0.06 10*3/MM3 (ref 0–0.2)
BASOPHILS NFR BLD AUTO: 0.7 % (ref 0–1.5)
BILIRUB SERPL-MCNC: 0.6 MG/DL (ref 0–1.2)
BUN SERPL-MCNC: 18.6 MG/DL (ref 6–20)
BUN/CREAT SERPL: 31.5 (ref 7–25)
CALCIUM SPEC-SCNC: 10.1 MG/DL (ref 8.6–10.5)
CHLORIDE SERPL-SCNC: 104 MMOL/L (ref 98–107)
CO2 SERPL-SCNC: 20.6 MMOL/L (ref 22–29)
CREAT SERPL-MCNC: 0.59 MG/DL (ref 0.57–1)
DEPRECATED RDW RBC AUTO: 63.9 FL (ref 37–54)
EGFRCR SERPLBLD CKD-EPI 2021: 123 ML/MIN/1.73
EOSINOPHIL # BLD AUTO: 0.09 10*3/MM3 (ref 0–0.4)
EOSINOPHIL NFR BLD AUTO: 1.1 % (ref 0.3–6.2)
ERYTHROCYTE [DISTWIDTH] IN BLOOD BY AUTOMATED COUNT: 17.7 % (ref 12.3–15.4)
GLOBULIN UR ELPH-MCNC: 3.4 GM/DL
GLUCOSE SERPL-MCNC: 142 MG/DL (ref 65–99)
HCT VFR BLD AUTO: 28.5 % (ref 34–46.6)
HGB BLD-MCNC: 8.8 G/DL (ref 12–15.9)
IMM GRANULOCYTES # BLD AUTO: 0.38 10*3/MM3 (ref 0–0.05)
IMM GRANULOCYTES NFR BLD AUTO: 4.6 % (ref 0–0.5)
LYMPHOCYTES # BLD AUTO: 1.76 10*3/MM3 (ref 0.7–3.1)
LYMPHOCYTES NFR BLD AUTO: 21.2 % (ref 19.6–45.3)
MAGNESIUM SERPL-MCNC: 2.1 MG/DL (ref 1.6–2.6)
MCH RBC QN AUTO: 30.6 PG (ref 26.6–33)
MCHC RBC AUTO-ENTMCNC: 30.9 G/DL (ref 31.5–35.7)
MCV RBC AUTO: 99 FL (ref 79–97)
MONOCYTES # BLD AUTO: 0.83 10*3/MM3 (ref 0.1–0.9)
MONOCYTES NFR BLD AUTO: 10 % (ref 5–12)
NEUTROPHILS NFR BLD AUTO: 5.17 10*3/MM3 (ref 1.7–7)
NEUTROPHILS NFR BLD AUTO: 62.4 % (ref 42.7–76)
NRBC BLD AUTO-RTO: 0 /100 WBC (ref 0–0.2)
PLATELET # BLD AUTO: 337 10*3/MM3 (ref 140–450)
PMV BLD AUTO: 11.4 FL (ref 6–12)
POTASSIUM SERPL-SCNC: 4.8 MMOL/L (ref 3.5–5.2)
PROT SERPL-MCNC: 7.2 G/DL (ref 6–8.5)
RBC # BLD AUTO: 2.88 10*6/MM3 (ref 3.77–5.28)
SODIUM SERPL-SCNC: 138 MMOL/L (ref 136–145)
WBC NRBC COR # BLD AUTO: 8.29 10*3/MM3 (ref 3.4–10.8)

## 2025-08-10 PROCEDURE — 99232 SBSQ HOSP IP/OBS MODERATE 35: CPT

## 2025-08-10 PROCEDURE — 85060 BLOOD SMEAR INTERPRETATION: CPT

## 2025-08-10 PROCEDURE — 83735 ASSAY OF MAGNESIUM: CPT | Performed by: FAMILY MEDICINE

## 2025-08-10 PROCEDURE — 80053 COMPREHEN METABOLIC PANEL: CPT | Performed by: FAMILY MEDICINE

## 2025-08-10 PROCEDURE — 85025 COMPLETE CBC W/AUTO DIFF WBC: CPT | Performed by: FAMILY MEDICINE

## 2025-08-10 PROCEDURE — 25010000002 HEPARIN (PORCINE) PER 1000 UNITS: Performed by: STUDENT IN AN ORGANIZED HEALTH CARE EDUCATION/TRAINING PROGRAM

## 2025-08-10 RX ORDER — DULOXETIN HYDROCHLORIDE 30 MG/1
30 CAPSULE, DELAYED RELEASE ORAL DAILY
Status: DISCONTINUED | OUTPATIENT
Start: 2025-08-10 | End: 2025-08-11 | Stop reason: HOSPADM

## 2025-08-10 RX ADMIN — Medication 100 MG: at 08:12

## 2025-08-10 RX ADMIN — PANTOPRAZOLE SODIUM 40 MG: 40 INJECTION, POWDER, FOR SOLUTION INTRAVENOUS at 08:16

## 2025-08-10 RX ADMIN — PROPRANOLOL HYDROCHLORIDE 20 MG: 20 TABLET ORAL at 08:11

## 2025-08-10 RX ADMIN — Medication 10 ML: at 20:25

## 2025-08-10 RX ADMIN — Medication 10 ML: at 08:12

## 2025-08-10 RX ADMIN — ACETAMINOPHEN 650 MG: 325 TABLET ORAL at 10:26

## 2025-08-10 RX ADMIN — BUSPIRONE HYDROCHLORIDE 10 MG: 10 TABLET ORAL at 20:23

## 2025-08-10 RX ADMIN — HEPARIN SODIUM 5000 UNITS: 5000 INJECTION INTRAVENOUS; SUBCUTANEOUS at 05:46

## 2025-08-10 RX ADMIN — PROPRANOLOL HYDROCHLORIDE 20 MG: 20 TABLET ORAL at 20:24

## 2025-08-10 RX ADMIN — DICLOFENAC SODIUM 2 G: 10 GEL TOPICAL at 12:14

## 2025-08-10 RX ADMIN — QUETIAPINE FUMARATE 25 MG: 25 TABLET ORAL at 20:23

## 2025-08-10 RX ADMIN — HEPARIN SODIUM 5000 UNITS: 5000 INJECTION INTRAVENOUS; SUBCUTANEOUS at 14:53

## 2025-08-10 RX ADMIN — GABAPENTIN 400 MG: 400 CAPSULE ORAL at 14:53

## 2025-08-10 RX ADMIN — BUSPIRONE HYDROCHLORIDE 10 MG: 10 TABLET ORAL at 17:10

## 2025-08-10 RX ADMIN — Medication 5 MG: at 00:32

## 2025-08-10 RX ADMIN — BUSPIRONE HYDROCHLORIDE 10 MG: 10 TABLET ORAL at 08:11

## 2025-08-10 RX ADMIN — HYDROXYZINE HYDROCHLORIDE 25 MG: 25 TABLET ORAL at 22:19

## 2025-08-10 RX ADMIN — ACETAMINOPHEN 650 MG: 325 TABLET ORAL at 20:23

## 2025-08-10 RX ADMIN — GABAPENTIN 400 MG: 400 CAPSULE ORAL at 08:10

## 2025-08-10 RX ADMIN — DICLOFENAC SODIUM 2 G: 10 GEL TOPICAL at 17:11

## 2025-08-10 RX ADMIN — HEPARIN SODIUM 5000 UNITS: 5000 INJECTION INTRAVENOUS; SUBCUTANEOUS at 20:24

## 2025-08-10 RX ADMIN — Medication 5 MG: at 20:24

## 2025-08-10 RX ADMIN — DICLOFENAC SODIUM 2 G: 10 GEL TOPICAL at 20:32

## 2025-08-10 RX ADMIN — PANTOPRAZOLE SODIUM 40 MG: 40 INJECTION, POWDER, FOR SOLUTION INTRAVENOUS at 17:10

## 2025-08-10 RX ADMIN — ACETAMINOPHEN 650 MG: 325 TABLET ORAL at 00:32

## 2025-08-10 RX ADMIN — DULOXETINE 30 MG: 30 CAPSULE, DELAYED RELEASE ORAL at 14:53

## 2025-08-10 RX ADMIN — DICLOFENAC SODIUM 2 G: 10 GEL TOPICAL at 08:17

## 2025-08-10 RX ADMIN — GABAPENTIN 400 MG: 400 CAPSULE ORAL at 20:23

## 2025-08-10 RX ADMIN — FOLIC ACID 1 MG: 1 TABLET ORAL at 08:12

## 2025-08-10 RX ADMIN — FERROUS SULFATE TAB 325 MG (65 MG ELEMENTAL FE) 325 MG: 325 (65 FE) TAB at 08:17

## 2025-08-11 VITALS
HEART RATE: 92 BPM | BODY MASS INDEX: 28.04 KG/M2 | RESPIRATION RATE: 17 BRPM | OXYGEN SATURATION: 95 % | TEMPERATURE: 97.8 F | DIASTOLIC BLOOD PRESSURE: 85 MMHG | SYSTOLIC BLOOD PRESSURE: 121 MMHG | HEIGHT: 64 IN | WEIGHT: 164.24 LBS

## 2025-08-11 LAB
ALBUMIN SERPL-MCNC: 3.7 G/DL (ref 3.5–5.2)
ALP SERPL-CCNC: 198 U/L (ref 39–117)
ALT SERPL W P-5'-P-CCNC: 74 U/L (ref 1–33)
ANION GAP SERPL CALCULATED.3IONS-SCNC: 13 MMOL/L (ref 5–15)
AST SERPL-CCNC: 189 U/L (ref 1–32)
BILIRUB CONJ SERPL-MCNC: 0.4 MG/DL (ref 0–0.3)
BILIRUB INDIRECT SERPL-MCNC: 0.2 MG/DL
BILIRUB SERPL-MCNC: 0.6 MG/DL (ref 0–1.2)
BUN SERPL-MCNC: 17.7 MG/DL (ref 6–20)
BUN/CREAT SERPL: 28.1 (ref 7–25)
CALCIUM SPEC-SCNC: 9.9 MG/DL (ref 8.6–10.5)
CHLORIDE SERPL-SCNC: 104 MMOL/L (ref 98–107)
CO2 SERPL-SCNC: 21 MMOL/L (ref 22–29)
CREAT SERPL-MCNC: 0.63 MG/DL (ref 0.57–1)
CYTOLOGIST CVX/VAG CYTO: NORMAL
DEPRECATED RDW RBC AUTO: 63.9 FL (ref 37–54)
EGFRCR SERPLBLD CKD-EPI 2021: 121 ML/MIN/1.73
ERYTHROCYTE [DISTWIDTH] IN BLOOD BY AUTOMATED COUNT: 17.3 % (ref 12.3–15.4)
GLUCOSE SERPL-MCNC: 119 MG/DL (ref 65–99)
HCT VFR BLD AUTO: 27.8 % (ref 34–46.6)
HGB BLD-MCNC: 8.6 G/DL (ref 12–15.9)
MAGNESIUM SERPL-MCNC: 1.8 MG/DL (ref 1.6–2.6)
MCH RBC QN AUTO: 30.6 PG (ref 26.6–33)
MCHC RBC AUTO-ENTMCNC: 30.9 G/DL (ref 31.5–35.7)
MCV RBC AUTO: 98.9 FL (ref 79–97)
PATH INTERP BLD-IMP: NORMAL
PLATELET # BLD AUTO: 326 10*3/MM3 (ref 140–450)
PMV BLD AUTO: 11.2 FL (ref 6–12)
POTASSIUM SERPL-SCNC: 4.8 MMOL/L (ref 3.5–5.2)
PROT SERPL-MCNC: 6.9 G/DL (ref 6–8.5)
RBC # BLD AUTO: 2.81 10*6/MM3 (ref 3.77–5.28)
SODIUM SERPL-SCNC: 138 MMOL/L (ref 136–145)
WBC NRBC COR # BLD AUTO: 7.81 10*3/MM3 (ref 3.4–10.8)

## 2025-08-11 PROCEDURE — 25010000002 HEPARIN (PORCINE) PER 1000 UNITS: Performed by: STUDENT IN AN ORGANIZED HEALTH CARE EDUCATION/TRAINING PROGRAM

## 2025-08-11 PROCEDURE — 80076 HEPATIC FUNCTION PANEL: CPT

## 2025-08-11 PROCEDURE — 25010000002 MAGNESIUM SULFATE 4 GM/100ML SOLUTION

## 2025-08-11 PROCEDURE — 80048 BASIC METABOLIC PNL TOTAL CA: CPT

## 2025-08-11 PROCEDURE — 83735 ASSAY OF MAGNESIUM: CPT

## 2025-08-11 PROCEDURE — 93005 ELECTROCARDIOGRAM TRACING: CPT

## 2025-08-11 PROCEDURE — 93010 ELECTROCARDIOGRAM REPORT: CPT | Performed by: INTERNAL MEDICINE

## 2025-08-11 PROCEDURE — 99239 HOSP IP/OBS DSCHRG MGMT >30: CPT

## 2025-08-11 PROCEDURE — 85027 COMPLETE CBC AUTOMATED: CPT

## 2025-08-11 RX ORDER — GABAPENTIN 400 MG/1
400 CAPSULE ORAL 3 TIMES DAILY
Qty: 9 CAPSULE | Refills: 0 | Status: SHIPPED | OUTPATIENT
Start: 2025-08-11 | End: 2025-08-14

## 2025-08-11 RX ORDER — FERROUS SULFATE 325(65) MG
325 TABLET ORAL
Qty: 30 TABLET | Refills: 0 | Status: SHIPPED | OUTPATIENT
Start: 2025-08-12 | End: 2025-09-11

## 2025-08-11 RX ORDER — ACAMPROSATE CALCIUM 333 MG/1
666 TABLET, DELAYED RELEASE ORAL 3 TIMES DAILY
Qty: 180 TABLET | Refills: 2 | Status: SHIPPED | OUTPATIENT
Start: 2025-08-11 | End: 2025-11-09

## 2025-08-11 RX ORDER — FOLIC ACID 1 MG/1
1 TABLET ORAL DAILY
Qty: 90 TABLET | Refills: 0 | Status: SHIPPED | OUTPATIENT
Start: 2025-08-11 | End: 2025-11-09

## 2025-08-11 RX ORDER — LANOLIN ALCOHOL/MO/W.PET/CERES
100 CREAM (GRAM) TOPICAL DAILY
Qty: 90 TABLET | Refills: 0 | Status: SHIPPED | OUTPATIENT
Start: 2025-08-12 | End: 2025-11-10

## 2025-08-11 RX ORDER — MAGNESIUM SULFATE HEPTAHYDRATE 40 MG/ML
4 INJECTION, SOLUTION INTRAVENOUS ONCE
Status: COMPLETED | OUTPATIENT
Start: 2025-08-11 | End: 2025-08-11

## 2025-08-11 RX ORDER — OXYCODONE HYDROCHLORIDE 5 MG/1
5 TABLET ORAL EVERY 8 HOURS PRN
Qty: 9 TABLET | Refills: 0 | Status: SHIPPED | OUTPATIENT
Start: 2025-08-11 | End: 2025-08-14

## 2025-08-11 RX ORDER — HYDROXYZINE HYDROCHLORIDE 25 MG/1
25 TABLET, FILM COATED ORAL EVERY 8 HOURS PRN
Qty: 90 TABLET | Refills: 0 | Status: SHIPPED | OUTPATIENT
Start: 2025-08-11 | End: 2025-09-10

## 2025-08-11 RX ORDER — QUETIAPINE FUMARATE 25 MG/1
25 TABLET, FILM COATED ORAL 2 TIMES DAILY PRN
Qty: 60 TABLET | Refills: 0 | Status: SHIPPED | OUTPATIENT
Start: 2025-08-11 | End: 2025-09-10

## 2025-08-11 RX ORDER — DULOXETIN HYDROCHLORIDE 30 MG/1
30 CAPSULE, DELAYED RELEASE ORAL DAILY
Qty: 30 CAPSULE | Refills: 0 | Status: SHIPPED | OUTPATIENT
Start: 2025-08-12 | End: 2025-09-11

## 2025-08-11 RX ADMIN — FOLIC ACID 1 MG: 1 TABLET ORAL at 09:26

## 2025-08-11 RX ADMIN — PROPRANOLOL HYDROCHLORIDE 20 MG: 20 TABLET ORAL at 09:25

## 2025-08-11 RX ADMIN — HEPARIN SODIUM 5000 UNITS: 5000 INJECTION INTRAVENOUS; SUBCUTANEOUS at 14:08

## 2025-08-11 RX ADMIN — Medication 10 ML: at 09:29

## 2025-08-11 RX ADMIN — PANTOPRAZOLE SODIUM 40 MG: 40 INJECTION, POWDER, FOR SOLUTION INTRAVENOUS at 09:25

## 2025-08-11 RX ADMIN — BUSPIRONE HYDROCHLORIDE 10 MG: 10 TABLET ORAL at 15:53

## 2025-08-11 RX ADMIN — DICLOFENAC SODIUM 2 G: 10 GEL TOPICAL at 14:15

## 2025-08-11 RX ADMIN — Medication 100 MG: at 09:26

## 2025-08-11 RX ADMIN — HEPARIN SODIUM 5000 UNITS: 5000 INJECTION INTRAVENOUS; SUBCUTANEOUS at 05:12

## 2025-08-11 RX ADMIN — DULOXETINE 30 MG: 30 CAPSULE, DELAYED RELEASE ORAL at 09:25

## 2025-08-11 RX ADMIN — GABAPENTIN 400 MG: 400 CAPSULE ORAL at 14:08

## 2025-08-11 RX ADMIN — MAGNESIUM SULFATE IN WATER FOR 4 G: 40 INJECTION INTRAVENOUS at 09:26

## 2025-08-11 RX ADMIN — BUSPIRONE HYDROCHLORIDE 10 MG: 10 TABLET ORAL at 09:26

## 2025-08-11 RX ADMIN — GABAPENTIN 400 MG: 400 CAPSULE ORAL at 09:25

## 2025-08-11 RX ADMIN — DICLOFENAC SODIUM 2 G: 10 GEL TOPICAL at 09:30

## 2025-08-11 RX ADMIN — FERROUS SULFATE TAB 325 MG (65 MG ELEMENTAL FE) 325 MG: 325 (65 FE) TAB at 09:26

## 2025-08-12 LAB
QT INTERVAL: 334 MS
QTC INTERVAL: 421 MS

## 2025-08-22 ENCOUNTER — TELEPHONE (OUTPATIENT)
Dept: INTERNAL MEDICINE | Age: 30
End: 2025-08-22

## 2025-08-22 ENCOUNTER — LAB (OUTPATIENT)
Dept: INTERNAL MEDICINE | Age: 30
End: 2025-08-22
Payer: COMMERCIAL

## 2025-08-22 ENCOUNTER — OFFICE VISIT (OUTPATIENT)
Dept: INTERNAL MEDICINE | Age: 30
End: 2025-08-22
Payer: COMMERCIAL

## 2025-08-22 VITALS
HEART RATE: 88 BPM | SYSTOLIC BLOOD PRESSURE: 104 MMHG | OXYGEN SATURATION: 100 % | TEMPERATURE: 96.8 F | BODY MASS INDEX: 29.84 KG/M2 | WEIGHT: 174.8 LBS | DIASTOLIC BLOOD PRESSURE: 68 MMHG | HEIGHT: 64 IN

## 2025-08-22 DIAGNOSIS — M79.604 BILATERAL LEG PAIN: Primary | ICD-10-CM

## 2025-08-22 DIAGNOSIS — G47.00 INSOMNIA, UNSPECIFIED TYPE: ICD-10-CM

## 2025-08-22 DIAGNOSIS — K21.9 GASTROESOPHAGEAL REFLUX DISEASE, UNSPECIFIED WHETHER ESOPHAGITIS PRESENT: ICD-10-CM

## 2025-08-22 DIAGNOSIS — M79.605 BILATERAL LEG PAIN: Primary | ICD-10-CM

## 2025-08-22 DIAGNOSIS — F41.1 GAD (GENERALIZED ANXIETY DISORDER): ICD-10-CM

## 2025-08-22 DIAGNOSIS — Z00.00 ANNUAL PHYSICAL EXAM: ICD-10-CM

## 2025-08-22 DIAGNOSIS — F10.939 ALCOHOL WITHDRAWAL SYNDROME WITH COMPLICATION: ICD-10-CM

## 2025-08-22 DIAGNOSIS — F10.939 ALCOHOL WITHDRAWAL SYNDROME WITH COMPLICATION: Primary | ICD-10-CM

## 2025-08-22 LAB
ALBUMIN SERPL-MCNC: 3.8 G/DL (ref 3.5–5.2)
ALBUMIN/GLOB SERPL: 1 G/DL
ALP SERPL-CCNC: 97 U/L (ref 39–117)
ALT SERPL W P-5'-P-CCNC: 72 U/L (ref 1–33)
AMMONIA BLD-SCNC: 36 UMOL/L (ref 11–51)
ANION GAP SERPL CALCULATED.3IONS-SCNC: 12.3 MMOL/L (ref 5–15)
AST SERPL-CCNC: 64 U/L (ref 1–32)
BILIRUB SERPL-MCNC: 0.6 MG/DL (ref 0–1.2)
BUN SERPL-MCNC: 10 MG/DL (ref 6–20)
BUN/CREAT SERPL: 13.2 (ref 7–25)
CALCIUM SPEC-SCNC: 10.1 MG/DL (ref 8.6–10.5)
CHLORIDE SERPL-SCNC: 107 MMOL/L (ref 98–107)
CHOLEST SERPL-MCNC: 187 MG/DL (ref 0–200)
CO2 SERPL-SCNC: 22.7 MMOL/L (ref 22–29)
CREAT SERPL-MCNC: 0.76 MG/DL (ref 0.57–1)
DEPRECATED RDW RBC AUTO: 50.3 FL (ref 37–54)
EGFRCR SERPLBLD CKD-EPI 2021: 106.9 ML/MIN/1.73
ERYTHROCYTE [DISTWIDTH] IN BLOOD BY AUTOMATED COUNT: 14.6 % (ref 12.3–15.4)
FOLATE SERPL-MCNC: >20 NG/ML (ref 4.78–24.2)
GLOBULIN UR ELPH-MCNC: 3.7 GM/DL
GLUCOSE SERPL-MCNC: 93 MG/DL (ref 65–99)
HCT VFR BLD AUTO: 27 % (ref 34–46.6)
HDLC SERPL-MCNC: 39 MG/DL (ref 40–60)
HGB BLD-MCNC: 8.9 G/DL (ref 12–15.9)
LDLC SERPL CALC-MCNC: 130 MG/DL (ref 0–100)
LDLC/HDLC SERPL: 3.28 {RATIO}
MAGNESIUM SERPL-MCNC: 2 MG/DL (ref 1.6–2.6)
MCH RBC QN AUTO: 31.2 PG (ref 26.6–33)
MCHC RBC AUTO-ENTMCNC: 33 G/DL (ref 31.5–35.7)
MCV RBC AUTO: 94.7 FL (ref 79–97)
PLATELET # BLD AUTO: 213 10*3/MM3 (ref 140–450)
PMV BLD AUTO: 10.4 FL (ref 6–12)
POTASSIUM SERPL-SCNC: 4.1 MMOL/L (ref 3.5–5.2)
PROT SERPL-MCNC: 7.5 G/DL (ref 6–8.5)
RBC # BLD AUTO: 2.85 10*6/MM3 (ref 3.77–5.28)
SODIUM SERPL-SCNC: 142 MMOL/L (ref 136–145)
TRIGL SERPL-MCNC: 100 MG/DL (ref 0–150)
VIT B12 BLD-MCNC: 771 PG/ML (ref 211–946)
VLDLC SERPL-MCNC: 18 MG/DL (ref 5–40)
WBC NRBC COR # BLD AUTO: 4.78 10*3/MM3 (ref 3.4–10.8)

## 2025-08-22 PROCEDURE — 36415 COLL VENOUS BLD VENIPUNCTURE: CPT | Performed by: PHYSICIAN ASSISTANT

## 2025-08-22 RX ORDER — OXYCODONE HYDROCHLORIDE 5 MG/1
5 TABLET ORAL EVERY 6 HOURS PRN
Qty: 20 TABLET | Refills: 0 | Status: SHIPPED | OUTPATIENT
Start: 2025-08-22

## 2025-08-22 RX ORDER — OXYCODONE HYDROCHLORIDE 5 MG/1
5 TABLET ORAL
COMMUNITY
Start: 2025-08-18 | End: 2025-08-22 | Stop reason: SDUPTHER

## 2025-08-22 RX ORDER — PREGABALIN 150 MG/1
150 CAPSULE ORAL
COMMUNITY
Start: 2025-08-18

## 2025-08-25 RX ORDER — PROPRANOLOL HYDROCHLORIDE 10 MG/1
10 TABLET ORAL 3 TIMES DAILY PRN
Qty: 90 TABLET | Refills: 2 | Status: SHIPPED | OUTPATIENT
Start: 2025-08-25

## 2025-08-29 LAB — VIT B1 BLD-SCNC: 146.8 NMOL/L (ref 66.5–200)
